# Patient Record
Sex: MALE | Race: BLACK OR AFRICAN AMERICAN | Employment: FULL TIME | URBAN - METROPOLITAN AREA
[De-identification: names, ages, dates, MRNs, and addresses within clinical notes are randomized per-mention and may not be internally consistent; named-entity substitution may affect disease eponyms.]

---

## 2020-05-04 ENCOUNTER — TRANSCRIBE ORDERS (OUTPATIENT)
Dept: ADMINISTRATIVE | Age: 55
End: 2020-05-04

## 2020-05-04 ENCOUNTER — APPOINTMENT (OUTPATIENT)
Dept: LAB | Age: 55
End: 2020-05-04
Attending: PREVENTIVE MEDICINE

## 2020-05-04 ENCOUNTER — APPOINTMENT (OUTPATIENT)
Dept: RADIOLOGY | Age: 55
End: 2020-05-04
Attending: PREVENTIVE MEDICINE

## 2020-05-04 DIAGNOSIS — Z02.1 ENCOUNTER FOR PRE-EMPLOYMENT HEALTH SCREENING EXAMINATION: Primary | ICD-10-CM

## 2020-05-04 DIAGNOSIS — Z02.1 ENCOUNTER FOR PRE-EMPLOYMENT HEALTH SCREENING EXAMINATION: ICD-10-CM

## 2020-05-04 LAB
ALBUMIN SERPL BCP-MCNC: 3.5 G/DL (ref 3.5–5)
ALP SERPL-CCNC: 94 U/L (ref 46–116)
ALT SERPL W P-5'-P-CCNC: 28 U/L (ref 12–78)
ANION GAP SERPL CALCULATED.3IONS-SCNC: 6 MMOL/L (ref 4–13)
AST SERPL W P-5'-P-CCNC: 21 U/L (ref 5–45)
BASOPHILS # BLD AUTO: 0.04 THOUSANDS/ΜL (ref 0–0.1)
BASOPHILS NFR BLD AUTO: 1 % (ref 0–1)
BILIRUB SERPL-MCNC: 0.45 MG/DL (ref 0.2–1)
BUN SERPL-MCNC: 9 MG/DL (ref 5–25)
CALCIUM SERPL-MCNC: 8.9 MG/DL (ref 8.3–10.1)
CHLORIDE SERPL-SCNC: 104 MMOL/L (ref 100–108)
CO2 SERPL-SCNC: 29 MMOL/L (ref 21–32)
CREAT SERPL-MCNC: 1.11 MG/DL (ref 0.6–1.3)
EOSINOPHIL # BLD AUTO: 0.08 THOUSAND/ΜL (ref 0–0.61)
EOSINOPHIL NFR BLD AUTO: 1 % (ref 0–6)
ERYTHROCYTE [DISTWIDTH] IN BLOOD BY AUTOMATED COUNT: 13.2 % (ref 11.6–15.1)
GFR SERPL CREATININE-BSD FRML MDRD: 87 ML/MIN/1.73SQ M
GLUCOSE SERPL-MCNC: 124 MG/DL (ref 65–140)
HCT VFR BLD AUTO: 46.2 % (ref 36.5–49.3)
HGB BLD-MCNC: 15.1 G/DL (ref 12–17)
IMM GRANULOCYTES # BLD AUTO: 0.02 THOUSAND/UL (ref 0–0.2)
IMM GRANULOCYTES NFR BLD AUTO: 0 % (ref 0–2)
LYMPHOCYTES # BLD AUTO: 2.11 THOUSANDS/ΜL (ref 0.6–4.47)
LYMPHOCYTES NFR BLD AUTO: 34 % (ref 14–44)
MCH RBC QN AUTO: 29.9 PG (ref 26.8–34.3)
MCHC RBC AUTO-ENTMCNC: 32.7 G/DL (ref 31.4–37.4)
MCV RBC AUTO: 92 FL (ref 82–98)
MONOCYTES # BLD AUTO: 0.67 THOUSAND/ΜL (ref 0.17–1.22)
MONOCYTES NFR BLD AUTO: 11 % (ref 4–12)
NEUTROPHILS # BLD AUTO: 3.24 THOUSANDS/ΜL (ref 1.85–7.62)
NEUTS SEG NFR BLD AUTO: 53 % (ref 43–75)
NRBC BLD AUTO-RTO: 0 /100 WBCS
PLATELET # BLD AUTO: 326 THOUSANDS/UL (ref 149–390)
PMV BLD AUTO: 10.3 FL (ref 8.9–12.7)
POTASSIUM SERPL-SCNC: 3.8 MMOL/L (ref 3.5–5.3)
PROT SERPL-MCNC: 7.6 G/DL (ref 6.4–8.2)
RBC # BLD AUTO: 5.05 MILLION/UL (ref 3.88–5.62)
SODIUM SERPL-SCNC: 139 MMOL/L (ref 136–145)
WBC # BLD AUTO: 6.16 THOUSAND/UL (ref 4.31–10.16)

## 2020-05-04 PROCEDURE — 36415 COLL VENOUS BLD VENIPUNCTURE: CPT

## 2020-05-04 PROCEDURE — 85025 COMPLETE CBC W/AUTO DIFF WBC: CPT

## 2020-05-04 PROCEDURE — 71046 X-RAY EXAM CHEST 2 VIEWS: CPT

## 2020-05-04 PROCEDURE — 80053 COMPREHEN METABOLIC PANEL: CPT

## 2021-01-19 ENCOUNTER — OFFICE VISIT (OUTPATIENT)
Dept: CARDIOLOGY CLINIC | Facility: CLINIC | Age: 56
End: 2021-01-19
Payer: COMMERCIAL

## 2021-01-19 VITALS
HEIGHT: 70 IN | HEART RATE: 72 BPM | WEIGHT: 261.2 LBS | SYSTOLIC BLOOD PRESSURE: 138 MMHG | BODY MASS INDEX: 37.39 KG/M2 | DIASTOLIC BLOOD PRESSURE: 88 MMHG

## 2021-01-19 DIAGNOSIS — I10 ESSENTIAL HYPERTENSION: ICD-10-CM

## 2021-01-19 DIAGNOSIS — I10 ESSENTIAL (PRIMARY) HYPERTENSION: ICD-10-CM

## 2021-01-19 DIAGNOSIS — R00.1 BRADYCARDIA: Primary | ICD-10-CM

## 2021-01-19 DIAGNOSIS — I10 HYPERTENSION, UNSPECIFIED TYPE: ICD-10-CM

## 2021-01-19 PROCEDURE — 99243 OFF/OP CNSLTJ NEW/EST LOW 30: CPT | Performed by: INTERNAL MEDICINE

## 2021-01-19 RX ORDER — DIPHENHYD/PHENYLEPH/ACETAMINOP 12.5-5-325
TABLET ORAL DAILY
Qty: 1 EACH | Refills: 0 | Status: SHIPPED | OUTPATIENT
Start: 2021-01-19

## 2021-01-19 RX ORDER — OMEGA-3 FATTY ACIDS CAP DELAYED RELEASE 1000 MG 1000 MG
CAPSULE DELAYED RELEASE ORAL DAILY
COMMUNITY

## 2021-01-19 RX ORDER — MULTIVITAMIN
1 CAPSULE ORAL DAILY
COMMUNITY

## 2021-01-19 RX ORDER — AMLODIPINE BESYLATE 5 MG/1
TABLET ORAL
Qty: 30 TABLET | Refills: 0 | Status: SHIPPED | OUTPATIENT
Start: 2021-01-19 | End: 2021-02-12

## 2021-01-19 NOTE — PROGRESS NOTES
Cardiology Follow Up    Eugene Tavera  1965  [id number]  [facility]  [address] [address] [phone]    1  Bradycardia     2  Hypertension, unspecified type         Interval History:  Cardiology consultation  Pleasant 61-year-old [facility] American male who has history of longstanding hypertension, he has been experiencing blood pressure problems for almost 2 decades  Well controlled for the most part per patient, he was on thiazide diuretic for several years  He was on was switched to beta-blocker few years ago, initially nebivolol her most recent metoprolol, this was discontinue couple weeks ago, was switched over to amlodipine  Today's blood pressure is 138/88, he does not have any ambulatory readings  He was found to be bradycardic as well in the setting of chronic beta-blocker therapy  Denies syncope or presyncope denies any chest pain, active but no regular exercise, sleep hygiene appears to be adequate, he admits to close to 25 lb weight gain over last few months because of the COVID-19 pandemic  His BMI is 37, he tries told although he does not read labels, he does not follow a strict 2 g/24 hour sodium chloride restriction  He admits to be under significant stress at work  Denies hypersomnolence, denies history of Witness sleep apneas  Blood work from last year revealed normal electrolytes and liver function tests and renal function test   Lipid status is unknown  Recent EKG, personally revealed revealed significant sinus bradycardia heart rate of 46, normal QRS and ST segments      Patient Active Problem List   Diagnosis    Hypertension    Bradycardia     Past Medical History:   Diagnosis Date    Bradycardia     Weight gain      Social History     Socioeconomic History    Marital status: Single     Spouse name: Not on file    Number of children: Not on file    Years of education: Not on file    Highest education level: Not on file   Occupational History    Not on file   Social Needs    Financial resource strain: Not on file    Food insecurity     Worry: Not on file     Inability: Not on file    Transportation needs     Medical: Not on file     Non-medical: Not on file   Tobacco Use    Smoking status: Never Smoker    Smokeless tobacco: Never Used   Substance and Sexual Activity    Alcohol use: Yes     Comment: Occasional     Drug use: Not on file     Comment: No dug use - As per [employer]    Sexual activity: Not on file   Lifestyle    Physical activity     Days per week: Not on file     Minutes per session: Not on file    Stress: Not on file   Relationships    Social connections     Talks on phone: Not on file     Gets together: Not on file     Attends Church service: Not on file     Active member of club or organization: Not on file     Attends meetings of clubs or organizations: Not on file     Relationship status: Not on file    Intimate partner violence     Fear of current or ex partner: Not on file     Emotionally abused: Not on file     Physically abused: Not on file     Forced sexual activity: Not on file   Other Topics Concern    Not on file   Social History Narrative    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    Lives with his mom        Sees urology in [place]; last seen 2013 in [place], [place]    - As per [address]      Family History   Problem Relation Age of Onset    Hypertension Mother     Other Father         pacemaker     Hypertension Father     Prostate cancer Father         diagnosed in his 62s     Prostate cancer Family      Past Surgical History:   Procedure Laterality Date    COLONOSCOPY  2013    [place], [place]     HEMORROIDECTOMY      [id number]     HERNIA REPAIR Bilateral     In childhood     SHOULDER SURGERY      Pain - impingement       Current Outpatient Medications:     amLODIPine (NORVASC) 5 mg tablet, TAKE 1 TABLET BY MOUTH EVERY DAY, Disp: 30 tablet, Rfl: 0    CINNAMON PO, Take by mouth daily, Disp: , Rfl:     Multiple Vitamin (multivitamin) capsule, Take 1 capsule by mouth daily, Disp: , Rfl:     Omega-3 Fatty Acids (Fish Oil) 1000 MG CPDR, Take by mouth daily, Disp: , Rfl:   Allergies   Allergen Reactions    Penicillins Swelling       Labs:  No visits with results within 6 Month(s) from this visit  Latest known visit with results is:   Appointment on 05/04/2020   Component Date Value    WBC 05/04/2020 6 16     RBC 05/04/2020 5 05     Hemoglobin 05/04/2020 15 1     Hematocrit 05/04/2020 46 2     MCV 05/04/2020 92     MCH 05/04/2020 29 9     MCHC 05/04/2020 32 7     RDW 05/04/2020 13 2     MPV 05/04/2020 10 3     Platelets 65/90/7397 326     nRBC 05/04/2020 0     Neutrophils Relative 05/04/2020 53     Immat GRANS % 05/04/2020 0     Lymphocytes Relative 05/04/2020 34     Monocytes Relative 05/04/2020 11     Eosinophils Relative 05/04/2020 1     Basophils Relative 05/04/2020 1     Neutrophils Absolute 05/04/2020 3 24     Immature Grans Absolute 05/04/2020 0 02     Lymphocytes Absolute 05/04/2020 2 11     Monocytes Absolute 05/04/2020 0 67     Eosinophils Absolute 05/04/2020 0 08     Basophils Absolute 05/04/2020 0 04     Sodium 05/04/2020 139     Potassium 05/04/2020 3 8     Chloride 05/04/2020 104     CO2 05/04/2020 29     ANION GAP 05/04/2020 6     BUN 05/04/2020 9     Creatinine 05/04/2020 1 11     Glucose 05/04/2020 124     Calcium 05/04/2020 8 9     AST 05/04/2020 21     ALT 05/04/2020 28     Alkaline Phosphatase 05/04/2020 94     Total Protein 05/04/2020 7 6     Albumin 05/04/2020 3 5     Total Bilirubin 05/04/2020 0 45     eGFR 05/04/2020 87      Imaging: No results found  Review of Systems:  Review of Systems   Constitutional: Positive for unexpected weight change  Negative for activity change, chills, fatigue and fever     HENT: Negative for congestion, hearing loss, nosebleeds, sore throat and trouble swallowing  Eyes: Negative for visual disturbance  Respiratory: Negative for apnea, cough, choking, chest tightness, shortness of breath, wheezing and stridor  Cardiovascular: Negative for chest pain, palpitations and leg swelling  Gastrointestinal: Negative for abdominal distention, abdominal pain, anal bleeding, blood in stool, constipation, diarrhea, nausea, rectal pain and vomiting  Endocrine: Negative for cold intolerance and heat intolerance  Genitourinary: Negative for difficulty urinating, dysuria, flank pain, frequency, hematuria and urgency  Musculoskeletal: Positive for joint swelling  Negative for arthralgias, back pain, gait problem and myalgias  Skin: Positive for rash  Negative for pallor and wound  Allergic/Immunologic: Negative for immunocompromised state  Neurological: Positive for headaches  Negative for dizziness, tremors, seizures, syncope, facial asymmetry, speech difficulty, weakness, light-headedness and numbness  Psychiatric/Behavioral: Negative for behavioral problems, confusion, decreased concentration and sleep disturbance  The patient is not nervous/anxious  Physical Exam:  Physical Exam  Vitals signs reviewed  Constitutional:       General: He is not in acute distress  Appearance: Normal appearance  He is obese  He is not ill-appearing, toxic-appearing or diaphoretic  HENT:      Head: Normocephalic  Eyes:      General: No scleral icterus  Conjunctiva/sclera: Conjunctivae normal    Neck:      Vascular: No carotid bruit  Cardiovascular:      Rate and Rhythm: Regular rhythm  Bradycardia present  Pulses: Normal pulses  Heart sounds: Normal heart sounds  No murmur  No friction rub  No gallop  Pulmonary:      Effort: Pulmonary effort is normal  No respiratory distress  Breath sounds: Normal breath sounds  No stridor  No wheezing, rhonchi or rales     Abdominal:      General: Bowel sounds are normal       Palpations: Abdomen is soft  Tenderness: There is no abdominal tenderness  Comments: No bruits   Musculoskeletal:      Right lower leg: No edema  Left lower leg: No edema  Skin:     General: Skin is warm and dry  Capillary Refill: Capillary refill takes less than 2 seconds  Coloration: Skin is not jaundiced  Findings: No bruising or erythema  Neurological:      General: No focal deficit present  Mental Status: He is alert and oriented to person, place, and time  Psychiatric:         Mood and Affect: Mood normal          Behavior: Behavior normal          Thought Content: Thought content normal          Judgment: Judgment normal          Discussion/Summary:  Hypertension essential, borderline to partial control on recently started calcium channel blocker  Significant bradycardia in the setting of previous beta-blocker use, the present time favor ambulatory blood pressure readings, I had given instructions to do so, is to call me back in 2 weeks times with the readings and average and adjustments will be made at that time  Nonpharmacological ways including improvement of adherence to low-sodium diet, regular exercise and weight management recommended  He may need a polysomnogram at some point in the future blood pressure difficult to control, the present time favor no interventions in that regard  Long-term benefits of blood pressure control and reduction of CVA, reduction of renal problems and myocardial infarction were also explained to the patient  Given the bradycardia possibly related to his beta-blocker use, will do a Holter monitor as well as an exercise stress test to assess blood pressure response and chronotropic competence  This note was completed in part utilizing m-[employer] fluency direct voice recognition software     Grammatical errors, random word insertion, spelling mistakes, and incomplete sentences may be an occasional consequence of the system secondary to software limitations, ambient noise and hardware issues  At the time of dictation, efforts were made to edit, clarify and /or correct errors  Please read the chart carefully and recognize, using context, where substitutions have occurred  If you have any questions or concerns about the context, text or information contained within the body of this dictation, please contact myself, the provider, for further clarification

## 2021-01-23 PROBLEM — R73.9 HYPERGLYCEMIA: Status: ACTIVE | Noted: 2021-01-23

## 2021-01-23 PROBLEM — E78.00 HYPERCHOLESTEREMIA: Status: ACTIVE | Noted: 2021-01-23

## 2021-01-23 PROBLEM — R79.89 LOW TSH LEVEL: Status: ACTIVE | Noted: 2021-01-23

## 2021-01-23 PROBLEM — R63.5 WEIGHT GAIN: Status: ACTIVE | Noted: 2021-01-23

## 2021-01-23 NOTE — ASSESSMENT & PLAN NOTE
TSH is 0 25, but  free T3 is 3 64 normal and free T4 is also normal 1 17    Possible euthyroid sick syndrome will follow-up the TSH if persistently low will need further evaluation

## 2021-01-26 ENCOUNTER — OFFICE VISIT (OUTPATIENT)
Dept: INTERNAL MEDICINE CLINIC | Facility: CLINIC | Age: 56
End: 2021-01-26
Payer: COMMERCIAL

## 2021-01-26 VITALS
DIASTOLIC BLOOD PRESSURE: 78 MMHG | WEIGHT: 258 LBS | HEIGHT: 70 IN | TEMPERATURE: 98.3 F | BODY MASS INDEX: 36.94 KG/M2 | OXYGEN SATURATION: 99 % | HEART RATE: 63 BPM | SYSTOLIC BLOOD PRESSURE: 126 MMHG

## 2021-01-26 DIAGNOSIS — R79.89 LOW TSH LEVEL: ICD-10-CM

## 2021-01-26 DIAGNOSIS — R00.1 BRADYCARDIA: ICD-10-CM

## 2021-01-26 DIAGNOSIS — E78.00 HYPERCHOLESTEREMIA: ICD-10-CM

## 2021-01-26 DIAGNOSIS — I10 ESSENTIAL HYPERTENSION: Primary | ICD-10-CM

## 2021-01-26 DIAGNOSIS — R73.9 HYPERGLYCEMIA: ICD-10-CM

## 2021-01-26 DIAGNOSIS — Z12.5 SCREENING PSA (PROSTATE SPECIFIC ANTIGEN): ICD-10-CM

## 2021-01-26 PROCEDURE — 1036F TOBACCO NON-USER: CPT | Performed by: INTERNAL MEDICINE

## 2021-01-26 PROCEDURE — 99213 OFFICE O/P EST LOW 20 MIN: CPT | Performed by: INTERNAL MEDICINE

## 2021-01-26 PROCEDURE — 3074F SYST BP LT 130 MM HG: CPT | Performed by: INTERNAL MEDICINE

## 2021-01-26 PROCEDURE — 3078F DIAST BP <80 MM HG: CPT | Performed by: INTERNAL MEDICINE

## 2021-01-26 PROCEDURE — 3008F BODY MASS INDEX DOCD: CPT | Performed by: INTERNAL MEDICINE

## 2021-01-26 NOTE — ASSESSMENT & PLAN NOTE
Heart rate 56 today better than last time but still has a bradycardia  Patient denies symptoms of sleep apnea     Had a cardiology evaluation going for stress test and Holter monitor

## 2021-01-26 NOTE — PROGRESS NOTES
Assessment/Plan:         Problem List Items Addressed This Visit        Cardiovascular and Mediastinum    Hypertension - Primary     Bp better on present med  Advise to watch diet for salt intake cont  present med  Relevant Orders    Glucose, fasting       Other    Bradycardia     Heart rate 56 today better than last time but still has a bradycardia  Patient denies symptoms of sleep apnea     Had a cardiology evaluation going for stress test and Holter monitor  Hypercholesteremia     His cholesterol is 263, triglyceride 135, HDL 49,   Patient would like to try to watch diet and try to lose weight,and  see if  He can control without medicine  Will follow after 3 months a persistently elevated will need medication  Relevant Orders    Lipid panel    Hyperglycemia     His fasting blood sugars 110  Advised to watch diet for sugar carbs intake  Relevant Orders    Hemoglobin A1C    Glucose, fasting    Low TSH level     TSH is 0 25, but  free T3 is 3 64 normal and free T4 is also normal 1 17  Possible euthyroid sick syndrome will follow-up the TSH if persistently low will need further evaluation         Relevant Orders    TSH, 3rd generation    BMI 37 0-37 9, adult     Discussed with the patient's to exercise ,decrease calorie intake and lose weight              Other Visit Diagnoses     Screening PSA (prostate specific antigen)        Relevant Orders    PSA, Total Screen            Subjective:      Patient ID: Nakia Valdez is a 55 y o  male  HPI patient is here for follow-up  He denies any chest pain shortness of breath or pain in abdomen  Denies any cough, fever, chills  Overall he is doing well  Presently does not have any other new complaints      The following portions of the patient's history were reviewed and updated as appropriate:   Past Medical History:  He has a past medical history of BMI 37 0-37 9, adult (1/26/2021), Bradycardia, Hypercholesteremia (1/23/2021), Hyperglycemia (1/23/2021), Low TSH level (1/23/2021), Weight gain, and Weight gain (1/23/2021)  ,  _______________________________________________________________________  Past Surgical History:   has a past surgical history that includes Hernia repair (Bilateral); Hemorroidectomy; Shoulder surgery; and Colonoscopy (2013)  ,  _______________________________________________________________________  Family History:  family history includes Hypertension in his father and mother; Other in his father; Prostate cancer in his family and father ,  _______________________________________________________________________  Social History:   reports that he has never smoked  He has never used smokeless tobacco  He reports current alcohol use  He reports that he does not use drugs  ,  _______________________________________________________________________  Allergies:  is allergic to penicillins     _______________________________________________________________________  Current Outpatient Medications   Medication Sig Dispense Refill    amLODIPine (NORVASC) 5 mg tablet TAKE 1 TABLET BY MOUTH EVERY DAY 30 tablet 0    Blood Pressure Monitoring (Blood Pressure Kit) KIT Use daily Adult 1 each 0    CINNAMON PO Take by mouth daily      Multiple Vitamin (multivitamin) capsule Take 1 capsule by mouth daily      Omega-3 Fatty Acids (Fish Oil) 1000 MG CPDR Take by mouth daily       No current facility-administered medications for this visit       _______________________________________________________________________  Review of Systems   Constitutional: Negative for chills, fatigue and fever  HENT: Negative for congestion, ear pain, postnasal drip, sinus pain, sore throat and trouble swallowing  Eyes: Negative for pain and visual disturbance  Respiratory: Negative for cough, chest tightness and shortness of breath  Cardiovascular: Negative for chest pain, palpitations and leg swelling     Gastrointestinal: Negative for abdominal pain, blood in stool, constipation, diarrhea and nausea  Genitourinary: Negative for dysuria, flank pain and frequency  Musculoskeletal: Negative for arthralgias and myalgias  Skin: Negative for rash  Neurological: Negative for dizziness, speech difficulty, weakness and headaches  Hematological: Does not bruise/bleed easily  Psychiatric/Behavioral: Negative for behavioral problems  Objective:  Vitals:    01/26/21 1519   BP: 126/78   BP Location: Right arm   Patient Position: Sitting   Cuff Size: Adult   Pulse: 63   Temp: 98 3 °F (36 8 °C)   TempSrc: Tympanic   SpO2: 99%   Weight: 117 kg (258 lb)   Height: 5' 10" (1 778 m)     Body mass index is 37 02 kg/m²  Physical Exam  Vitals signs and nursing note reviewed  Constitutional:       Appearance: Normal appearance  HENT:      Head: Normocephalic  Right Ear: Tympanic membrane and external ear normal       Left Ear: Tympanic membrane and external ear normal       Mouth/Throat:      Comments: Patient has face mask on  Eyes:      General: No scleral icterus  Pupils: Pupils are equal, round, and reactive to light  Neck:      Musculoskeletal: No neck rigidity or muscular tenderness  Vascular: No carotid bruit  Cardiovascular:      Rate and Rhythm: Normal rate and regular rhythm  Pulses: Normal pulses  Heart sounds: Normal heart sounds  No murmur  Pulmonary:      Effort: Pulmonary effort is normal  No respiratory distress  Breath sounds: Normal breath sounds  No wheezing  Abdominal:      General: Abdomen is flat  Palpations: Abdomen is soft  There is no mass  Tenderness: There is no abdominal tenderness  There is no rebound  Musculoskeletal:      Right lower leg: No edema  Left lower leg: No edema  Skin:     General: Skin is warm  Findings: No rash  Neurological:      General: No focal deficit present  Mental Status: He is alert  Motor: No weakness     Psychiatric: Mood and Affect: Mood normal          Behavior: Behavior normal

## 2021-01-26 NOTE — ASSESSMENT & PLAN NOTE
His cholesterol is 263, triglyceride 135, HDL 49,   Patient would like to try to watch diet and try to lose weight,and  see if  He can control without medicine  Will follow after 3 months a persistently elevated will need medication  detailed exam

## 2021-01-26 NOTE — PATIENT INSTRUCTIONS
Patient advised to continue present medications discussed with the patient medications and laboratory data in detail  Follow-up with me in 3 months    If any blood test was ordered please do 1 week prior to next appointment  If you have any questions please call the office [phone]

## 2021-02-02 ENCOUNTER — TELEPHONE (OUTPATIENT)
Dept: OTHER | Facility: OTHER | Age: 56
End: 2021-02-02

## 2021-02-05 NOTE — TELEPHONE ENCOUNTER
Left message on pt's cell phone that Dr Elinor Mathew reviewed bp readings and result is excellent and continue same meds  Asked pt to return my call to confirm he received

## 2021-02-12 DIAGNOSIS — I10 ESSENTIAL HYPERTENSION: ICD-10-CM

## 2021-02-12 DIAGNOSIS — I10 ESSENTIAL (PRIMARY) HYPERTENSION: ICD-10-CM

## 2021-02-12 RX ORDER — AMLODIPINE BESYLATE 5 MG/1
TABLET ORAL
Qty: 90 TABLET | Refills: 0 | Status: SHIPPED | OUTPATIENT
Start: 2021-02-12 | End: 2021-05-07

## 2021-02-23 ENCOUNTER — HOSPITAL ENCOUNTER (OUTPATIENT)
Dept: NON INVASIVE DIAGNOSTICS | Facility: HOSPITAL | Age: 56
Discharge: HOME | End: 2021-02-23
Payer: COMMERCIAL

## 2021-02-23 DIAGNOSIS — I10 HYPERTENSION, UNSPECIFIED TYPE: ICD-10-CM

## 2021-02-23 DIAGNOSIS — R00.1 BRADYCARDIA: ICD-10-CM

## 2021-02-23 LAB
CHEST PAIN STATEMENT: NORMAL
MAX DIASTOLIC BP: 87 MMHG
MAX HEART RATE: 151 BPM
MAX PREDICTED HEART RATE: 165 BPM
MAX. SYSTOLIC BP: 179 MMHG
PROTOCOL NAME: NORMAL
REASON FOR TERMINATION: NORMAL
TARGET HR FORMULA: NORMAL
TEST INDICATION: NORMAL
TIME IN EXERCISE PHASE: NORMAL

## 2021-02-23 PROCEDURE — 93226 XTRNL ECG REC<48 HR SCAN A/R: CPT

## 2021-02-23 PROCEDURE — 93017 CV STRESS TEST TRACING ONLY: CPT

## 2021-02-23 PROCEDURE — 93225 XTRNL ECG REC<48 HRS REC: CPT

## 2021-02-24 PROCEDURE — 93016 CV STRESS TEST SUPVJ ONLY: CPT | Performed by: INTERNAL MEDICINE

## 2021-02-24 PROCEDURE — 93018 CV STRESS TEST I&R ONLY: CPT | Performed by: INTERNAL MEDICINE

## 2021-03-04 PROCEDURE — 93227 XTRNL ECG REC<48 HR R&I: CPT | Performed by: INTERNAL MEDICINE

## 2021-03-30 DIAGNOSIS — Z23 ENCOUNTER FOR IMMUNIZATION: ICD-10-CM

## 2021-04-03 ENCOUNTER — IMMUNIZATIONS (OUTPATIENT)
Dept: FAMILY MEDICINE CLINIC | Facility: HOSPITAL | Age: 56
End: 2021-04-03

## 2021-04-03 DIAGNOSIS — Z23 ENCOUNTER FOR IMMUNIZATION: Primary | ICD-10-CM

## 2021-04-03 PROCEDURE — 91300 SARS-COV-2 / COVID-19 MRNA VACCINE (PFIZER-BIONTECH) 30 MCG: CPT

## 2021-04-03 PROCEDURE — 0001A SARS-COV-2 / COVID-19 MRNA VACCINE (PFIZER-BIONTECH) 30 MCG: CPT

## 2021-04-20 LAB — HBA1C MFR BLD HPLC: 6.1 %

## 2021-04-25 ENCOUNTER — IMMUNIZATIONS (OUTPATIENT)
Dept: FAMILY MEDICINE CLINIC | Facility: HOSPITAL | Age: 56
End: 2021-04-25

## 2021-04-25 DIAGNOSIS — Z23 ENCOUNTER FOR IMMUNIZATION: Primary | ICD-10-CM

## 2021-04-25 PROCEDURE — 91300 SARS-COV-2 / COVID-19 MRNA VACCINE (PFIZER-BIONTECH) 30 MCG: CPT

## 2021-04-25 PROCEDURE — 0002A SARS-COV-2 / COVID-19 MRNA VACCINE (PFIZER-BIONTECH) 30 MCG: CPT

## 2021-04-27 ENCOUNTER — TELEPHONE (OUTPATIENT)
Dept: INTERNAL MEDICINE CLINIC | Facility: CLINIC | Age: 56
End: 2021-04-27

## 2021-04-27 ENCOUNTER — OFFICE VISIT (OUTPATIENT)
Dept: INTERNAL MEDICINE CLINIC | Facility: CLINIC | Age: 56
End: 2021-04-27
Payer: COMMERCIAL

## 2021-04-27 VITALS
BODY MASS INDEX: 36.51 KG/M2 | OXYGEN SATURATION: 99 % | HEIGHT: 70 IN | HEART RATE: 62 BPM | WEIGHT: 255 LBS | DIASTOLIC BLOOD PRESSURE: 84 MMHG | TEMPERATURE: 98.1 F | SYSTOLIC BLOOD PRESSURE: 124 MMHG

## 2021-04-27 DIAGNOSIS — G47.09 OTHER INSOMNIA: ICD-10-CM

## 2021-04-27 DIAGNOSIS — R79.89 LOW TSH LEVEL: ICD-10-CM

## 2021-04-27 DIAGNOSIS — R97.20 ELEVATED PSA: ICD-10-CM

## 2021-04-27 DIAGNOSIS — K63.5 HYPERPLASTIC POLYP OF SIGMOID COLON: ICD-10-CM

## 2021-04-27 DIAGNOSIS — I10 ESSENTIAL HYPERTENSION: Primary | ICD-10-CM

## 2021-04-27 DIAGNOSIS — R73.9 HYPERGLYCEMIA: ICD-10-CM

## 2021-04-27 DIAGNOSIS — E78.00 HYPERCHOLESTEREMIA: ICD-10-CM

## 2021-04-27 DIAGNOSIS — R00.1 BRADYCARDIA: ICD-10-CM

## 2021-04-27 PROCEDURE — 3008F BODY MASS INDEX DOCD: CPT | Performed by: INTERNAL MEDICINE

## 2021-04-27 PROCEDURE — 1036F TOBACCO NON-USER: CPT | Performed by: INTERNAL MEDICINE

## 2021-04-27 PROCEDURE — 99214 OFFICE O/P EST MOD 30 MIN: CPT | Performed by: INTERNAL MEDICINE

## 2021-04-27 PROCEDURE — 3074F SYST BP LT 130 MM HG: CPT | Performed by: INTERNAL MEDICINE

## 2021-04-27 RX ORDER — MELATONIN
DAILY
COMMUNITY
End: 2021-10-26 | Stop reason: SDUPTHER

## 2021-04-27 NOTE — ASSESSMENT & PLAN NOTE
His TSH was low 0 25 with normal free T3 and free T4 December 2020   His repeat TSH is still low 0 28  He does not have symptoms of hyperthyroidism except complain of insomnia

## 2021-04-27 NOTE — ASSESSMENT & PLAN NOTE
And rectal area  Colonoscopy was done November 2013  Discussed with the patient needs follow-up colonoscopy    Would like to wait until COVID 19 situation gets  better

## 2021-04-27 NOTE — ASSESSMENT & PLAN NOTE
Fasting blood sugar decreased from 110-97  But has elevated hemoglobin A1c up to 6 1    Consistent with prediabetes  advised for low sugar low carbs diet

## 2021-04-27 NOTE — PROGRESS NOTES
Assessment/Plan:    1  Essential hypertension  Assessment & Plan:  Blood pressure well controlled  Advised to continue present medication  Advised for low-salt diet  Orders:  -     CBC and differential; Future  -     Comprehensive metabolic panel; Future    2  Hyperplastic polyp of sigmoid colon  Assessment & Plan:  And rectal area  Colonoscopy was done November 2013  Discussed with the patient needs follow-up colonoscopy  Would like to wait until COVID 19 situation gets  better      3  Hypercholesteremia  Assessment & Plan:  Cholesterol decreased from 263-237, LDL decreased from 187-166  Still elevated discussed with the patient to start medication but he refused to start any medications  Will continue to watch diet and try to lose weight  Advised for low-cholesterol low saturated fat diet    Orders:  -     Lipid panel; Future    4  Hyperglycemia  Assessment & Plan:  Fasting blood sugar decreased from 110-97  But has elevated hemoglobin A1c up to 6 1  Consistent with prediabetes  advised for low sugar low carbs diet    Orders:  -     Comprehensive metabolic panel; Future  -     Hemoglobin A1C; Future    5  Low TSH level  Assessment & Plan:  His TSH was low 0 25 with normal free T3 and free T4 December 2020   His repeat TSH is still low 0 28  He does not have symptoms of hyperthyroidism except complain of insomnia  Orders:  -     Ambulatory referral to Endocrinology; Future    6  BMI 36 0-36 9,adult    7  Elevated PSA  Assessment & Plan:  Patient states me that he had seen a urologist for elevated PSA in [facility]  I Do not have  his previous medical record to compare his PSA level  Patient said he will schedule to see same urologist in [facility]  8  Other insomnia  Assessment & Plan:  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  Patient had tried melatonin in the past   So advised to take melatonin q h s  P r n  For insomnia for now        9  Bradycardia  Assessment & Plan:  Bradycardia resolved  Had seen Cardiology in the past      BMI Counseling: Body mass index is 36 59 kg/m²  The BMI is above normal  Nutrition recommendations include decreasing portion sizes, decreasing fast food intake, consuming healthier snacks, limiting drinks that contain sugar, moderation in carbohydrate intake, reducing intake of saturated and trans fat and reducing intake of cholesterol  Exercise recommendations include exercising 3-5 times per week and strength training exercises  No pharmacotherapy was ordered  Subjective:  Patient presents for follow-up  Patient ID: Ryder Finnegan is a 55 y o  male  HPI   20-year-old male patient presents for follow-up of his medical problems  He denies any chest pain, shortness with, pain abdomen  Denies any nausea, vomiting, diarrhea, constipation  Denies any cough, fever, chills  He got his COVID-19 vaccination   Complain of insomnia  Patient states his work schedule changed like a swing shift that might be affecting his sleep  He tried melatonin in the past    The following portions of the patient's history were reviewed and updated as appropriate:     Past Medical History:  He has a past medical history of BMI 36 0-36 9,adult (4/27/2021), BMI 37 0-37 9, adult (1/26/2021), Bradycardia, Elevated PSA (4/27/2021), Hypercholesteremia (1/23/2021), Hyperglycemia (1/23/2021), Hyperplastic polyp of sigmoid colon (4/27/2021), Low TSH level (1/23/2021), Other insomnia (4/27/2021), Weight gain, and Weight gain (1/23/2021)  ,  _______________________________________________________________________  Past Surgical History:   has a past surgical history that includes Hernia repair (Bilateral); Hemorroidectomy; Shoulder surgery; and Colonoscopy (2013)  ,  _______________________________________________________________________  Family History:  family history includes Hypertension in his father and mother;  Other in his father; Prostate cancer in his family and father ,  _______________________________________________________________________  Social History:   reports that he has never smoked  He has never used smokeless tobacco  He reports current alcohol use  He reports that he does not use drugs  ,  _______________________________________________________________________  Allergies:  is allergic to penicillins     _______________________________________________________________________  Current Outpatient Medications   Medication Sig Dispense Refill    amLODIPine (NORVASC) 5 mg tablet TAKE 1 TABLET BY MOUTH EVERY DAY 90 tablet 0    Blood Pressure Monitoring (Blood Pressure Kit) KIT Use daily Adult 1 each 0    cholecalciferol (VITAMIN D3) 1,000 units tablet Take by mouth daily 10 000 iu      CINNAMON PO Take by mouth daily      Multiple Vitamin (multivitamin) capsule Take 1 capsule by mouth daily      Omega-3 Fatty Acids (Fish Oil) 1000 MG CPDR Take by mouth daily       No current facility-administered medications for this visit       _______________________________________________________________________  Review of Systems   Constitutional: Negative for chills and fever  HENT: Negative for congestion, ear pain, hearing loss, nosebleeds, sinus pain, sore throat and trouble swallowing  Eyes: Negative for discharge, redness and visual disturbance  Respiratory: Negative for cough, chest tightness and shortness of breath  Cardiovascular: Negative for chest pain and palpitations  Gastrointestinal: Negative for abdominal pain, blood in stool, constipation, diarrhea, nausea and vomiting  Genitourinary: Negative for dysuria, flank pain, frequency and hematuria  Musculoskeletal: Negative for arthralgias, myalgias and neck pain  Skin: Negative for color change and rash  Neurological: Negative for dizziness, speech difficulty, weakness and headaches  Hematological: Does not bruise/bleed easily  Psychiatric/Behavioral: Positive for sleep disturbance  Negative for agitation and behavioral problems  Objective:  Vitals:    04/27/21 1244   BP: 124/84   BP Location: Right arm   Patient Position: Sitting   Cuff Size: Adult   Pulse: 62   Temp: 98 1 °F (36 7 °C)   TempSrc: Tympanic   SpO2: 99%   Weight: 116 kg (255 lb)   Height: 5' 10" (1 778 m)     Body mass index is 36 59 kg/m²  Physical Exam  Vitals signs and nursing note reviewed  Constitutional:       General: He is not in acute distress  Appearance: Normal appearance  HENT:      Head: Normocephalic and atraumatic  Right Ear: Ear canal and external ear normal       Left Ear: Ear canal and external ear normal       Mouth/Throat:      Comments: Patient has a face mask on  Eyes:      General: No scleral icterus  Extraocular Movements: Extraocular movements intact  Conjunctiva/sclera: Conjunctivae normal    Neck:      Musculoskeletal: Normal range of motion and neck supple  No muscular tenderness  Cardiovascular:      Rate and Rhythm: Normal rate and regular rhythm  Pulses: Normal pulses  Heart sounds: No murmur  Pulmonary:      Effort: Pulmonary effort is normal       Breath sounds: Normal breath sounds  Abdominal:      General: Bowel sounds are normal       Palpations: Abdomen is soft  Tenderness: There is no abdominal tenderness  Musculoskeletal: Normal range of motion  Right lower leg: No edema  Left lower leg: No edema  Skin:     General: Skin is warm  Findings: No rash  Neurological:      General: No focal deficit present  Mental Status: He is alert and oriented to person, place, and time  Psychiatric:         Mood and Affect: Mood normal          Behavior: Behavior normal          I spent 30 minutes with the patient today    More than 50% time spent for reviewing of external notes, reviewing of the results of diagnostics test, management of care, patient education and ordering of test

## 2021-04-27 NOTE — ASSESSMENT & PLAN NOTE
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

## 2021-04-27 NOTE — ASSESSMENT & PLAN NOTE
Patient states me that he had seen a urologist for elevated PSA in [facility]  I Do not have  his previous medical record to compare his PSA level  Patient said he will schedule to see same urologist in [facility]

## 2021-04-27 NOTE — ASSESSMENT & PLAN NOTE
Cholesterol decreased from 263-237, LDL decreased from 187-166  Still elevated discussed with the patient to start medication but he refused to start any medications  Will continue to watch diet and try to lose weight    Advised for low-cholesterol low saturated fat diet

## 2021-04-27 NOTE — PATIENT INSTRUCTIONS
Patient advised to continue present medications discussed with the patient medications and laboratory data in detail  Follow-up with me in 3 months    If any blood test was ordered please do 1 week prior to next appointment  If you have any questions please call the office [phone]

## 2021-05-07 DIAGNOSIS — I10 ESSENTIAL HYPERTENSION: ICD-10-CM

## 2021-05-07 DIAGNOSIS — I10 ESSENTIAL (PRIMARY) HYPERTENSION: ICD-10-CM

## 2021-05-07 RX ORDER — AMLODIPINE BESYLATE 5 MG/1
TABLET ORAL
Qty: 90 TABLET | Refills: 1 | Status: SHIPPED | OUTPATIENT
Start: 2021-05-07 | End: 2021-10-26 | Stop reason: SDUPTHER

## 2021-05-17 ENCOUNTER — OFFICE VISIT (OUTPATIENT)
Dept: ENDOCRINOLOGY | Facility: CLINIC | Age: 56
End: 2021-05-17
Payer: COMMERCIAL

## 2021-05-17 VITALS
BODY MASS INDEX: 36.75 KG/M2 | SYSTOLIC BLOOD PRESSURE: 136 MMHG | HEART RATE: 68 BPM | DIASTOLIC BLOOD PRESSURE: 88 MMHG | HEIGHT: 70 IN | TEMPERATURE: 99.6 F | WEIGHT: 256.7 LBS

## 2021-05-17 DIAGNOSIS — E01.0 THYROMEGALY: ICD-10-CM

## 2021-05-17 DIAGNOSIS — G47.09 OTHER INSOMNIA: ICD-10-CM

## 2021-05-17 DIAGNOSIS — R63.5 WEIGHT GAIN: ICD-10-CM

## 2021-05-17 DIAGNOSIS — R79.89 LOW TSH LEVEL: Primary | ICD-10-CM

## 2021-05-17 DIAGNOSIS — R73.03 PRE-DIABETES: ICD-10-CM

## 2021-05-17 PROCEDURE — 3079F DIAST BP 80-89 MM HG: CPT | Performed by: INTERNAL MEDICINE

## 2021-05-17 PROCEDURE — 1036F TOBACCO NON-USER: CPT | Performed by: INTERNAL MEDICINE

## 2021-05-17 PROCEDURE — 99214 OFFICE O/P EST MOD 30 MIN: CPT | Performed by: INTERNAL MEDICINE

## 2021-05-17 PROCEDURE — 3008F BODY MASS INDEX DOCD: CPT | Performed by: INTERNAL MEDICINE

## 2021-05-17 PROCEDURE — 3075F SYST BP GE 130 - 139MM HG: CPT | Performed by: INTERNAL MEDICINE

## 2021-05-17 NOTE — PATIENT INSTRUCTIONS
Please get labs done as ordered   Avoid biotin for 2-3 days prior to the lab work, you can take it after tests have been done   Follow-up in 2-3 months

## 2021-05-17 NOTE — PROGRESS NOTES
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  Will be trying melatonin to see if it helps   -  Will repeat TSH along with free T3, free T4, check thyroid antibodies -TSI, TPO, TG antibody   -  Discussed indications for treatment of subclinical hyperthyroidism     --Reviewed pathophysiology of hyperthyroidism, reviewing the hypothalamic-ptiuitary-thyroid axis and interpretation and significance of TSH, FT4, FT3 values  --Discussed the use of HIGGINS and scan to evaluate cause of hyperthyroidism  --Discussed possible etiologies of hyperthyroidism     3  Pre diabetes  4  Obesity/ weight gain   -Consistent carb diet as discussed, regular exercise       CC:  Hyperthyroidism    History of Present Illness     HPI:  Keven Montes De Oca is a 55 y o  male presents for evaluation of hyperthyroidism  noted to have low TSH levels on labs   Per primary care physician's notes, labs 12/2020 TSH is 0 25, but  free T3 is 3 64 normal and free T4 is also normal 1 17     Associated signs/symptoms due to hyperthyroidism:     Energy levels are good  Gained 25 lbs in the last few years, difficulty to loose weight   Does not sleep well - difficulty in falling asleep and wakes up multiple times at night   Shift worker, 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    complains of lowappetite  No heat intolerance/No  cold intolerance  No  diarrhea/No constipation  No sweating/No  Tremor/no palpitation   No dry skin/ No  brittle nails   no eye complaints  Swelling in the neck: No  Obstructive symptoms: no trouble breathing, swallowing or hoarseness in voice     No h/o Iodine exposure  Family history of thyroid disease: no     Taking Vitamin D3 10, 000 IU weekly     Pre-diabetes   GM , uncle h/o diabetes mellitus   Trying to eat better ;  Very active at work, no additional exercise    Started taking biotin supplements 1 week ago     All other systems were reviewed and were negative    Review of Systems    Historical Information   Past Medical History:   Diagnosis Date    BMI 36 0-36 9,adult 4/27/2021    BMI 37 0-37 9, adult 1/26/2021    Bradycardia     Elevated PSA 4/27/2021    Hypercholesteremia 1/23/2021    Hyperglycemia 1/23/2021    Hyperplastic polyp of sigmoid colon 4/27/2021    Low TSH level 1/23/2021    Other insomnia 4/27/2021    Weight gain     Weight gain 1/23/2021     Past Surgical History:   Procedure Laterality Date    COLONOSCOPY  2013    [place], [place]     HEMORROIDECTOMY      [id number]     HERNIA REPAIR Bilateral     In childhood     SHOULDER SURGERY      Pain - impingement     Social History   Social History     Substance and Sexual Activity   Alcohol Use Yes    Comment: Occasional      Social History     Substance and Sexual Activity   Drug Use Never    Comment: No dug use - As per [employer]     Social History     Tobacco Use   Smoking Status Never Smoker   Smokeless Tobacco Never Used     Family History:   Family History   Problem Relation Age of Onset    Hypertension Mother     Other Father         pacemaker     Hypertension Father     Prostate cancer Father         diagnosed in his 62s     Prostate cancer Family        Meds/Allergies   Current Outpatient Medications   Medication Sig Dispense Refill    amLODIPine (NORVASC) 5 mg tablet TAKE 1 TABLET BY MOUTH EVERY DAY 90 tablet 1    Blood Pressure Monitoring (Blood Pressure Kit) KIT Use daily Adult 1 each 0    cholecalciferol (VITAMIN D3) 1,000 units tablet Take by mouth daily 10 000 iu      CINNAMON PO Take by mouth daily      Multiple Vitamin (multivitamin) capsule Take 1 capsule by mouth daily      Omega-3 Fatty Acids (Fish Oil) 1000 MG CPDR Take by mouth daily       No current facility-administered medications for this visit        Allergies   Allergen Reactions  Penicillins Swelling       Objective   Vitals: Blood pressure 136/88, pulse 68, temperature 99 6 °F (37 6 °C), height 5' 10" (1 778 m), weight 116 kg (256 lb 11 2 oz)  Physical Exam  Constitutional:       General: He is not in acute distress  Appearance: He is well-developed  He is not diaphoretic  HENT:      Head: Normocephalic and atraumatic  Eyes:      Conjunctiva/sclera: Conjunctivae normal       Pupils: Pupils are equal, round, and reactive to light  Neck:      Musculoskeletal: Normal range of motion and neck supple  Comments:  Thyroid gland mildly enlarged, nontender, no nodules appreciated on palpation  Cardiovascular:      Rate and Rhythm: Normal rate and regular rhythm  Heart sounds: Normal heart sounds  No murmur  Pulmonary:      Effort: Pulmonary effort is normal  No respiratory distress  Breath sounds: Normal breath sounds  No wheezing  Abdominal:      General: There is no distension  Palpations: Abdomen is soft  Tenderness: There is no abdominal tenderness  There is no guarding  Skin:     General: Skin is warm and dry  Findings: No erythema or rash  Neurological:      Mental Status: He is alert and oriented to person, place, and time  Deep Tendon Reflexes: Reflexes normal       Comments: No tremors on the outstretched arms     Psychiatric:         Behavior: Behavior normal          Thought Content: Thought content normal          The history was obtained from the review of the chart, patient  Lab Results:      No results found for: TSH, I9BXGBK, S5KZBCT     TSH 0 28  (4/2021)      Lab Results   Component Value Date    BUN 9 05/04/2020    K 3 8 05/04/2020     05/04/2020    CO2 29 05/04/2020        Lab Results   Component Value Date    CALCIUM 8 9 05/04/2020        Imaging Studies:        I have personally reviewed pertinent reports  Portions of the record may have been created with voice recognition software   Occasional wrong word or "sound a like" substitutions may have occurred due to the inherent limitations of voice recognition software  Read the chart carefully and recognize, using context, where substitutions have occurred

## 2021-08-24 ENCOUNTER — OFFICE VISIT (OUTPATIENT)
Dept: ENDOCRINOLOGY | Facility: CLINIC | Age: 56
End: 2021-08-24
Payer: COMMERCIAL

## 2021-08-24 VITALS
WEIGHT: 260 LBS | HEIGHT: 70 IN | HEART RATE: 59 BPM | SYSTOLIC BLOOD PRESSURE: 150 MMHG | BODY MASS INDEX: 37.22 KG/M2 | TEMPERATURE: 97 F | DIASTOLIC BLOOD PRESSURE: 90 MMHG

## 2021-08-24 DIAGNOSIS — E55.9 VITAMIN D DEFICIENCY: ICD-10-CM

## 2021-08-24 DIAGNOSIS — R73.03 PRE-DIABETES: ICD-10-CM

## 2021-08-24 DIAGNOSIS — E01.0 THYROMEGALY: ICD-10-CM

## 2021-08-24 DIAGNOSIS — E05.90 SUBCLINICAL HYPERTHYROIDISM: Primary | ICD-10-CM

## 2021-08-24 PROCEDURE — 3008F BODY MASS INDEX DOCD: CPT | Performed by: INTERNAL MEDICINE

## 2021-08-24 PROCEDURE — 1036F TOBACCO NON-USER: CPT | Performed by: INTERNAL MEDICINE

## 2021-08-24 PROCEDURE — 99214 OFFICE O/P EST MOD 30 MIN: CPT | Performed by: INTERNAL MEDICINE

## 2021-08-24 NOTE — PATIENT INSTRUCTIONS
You are being referred for an ultrasound of the thyroid   Please repeat lab work- thyroid function test in 3-4 months, earlier if you develop any symptoms of hypo or hyperthyroidism   Continue vitamin-D supplementation   Follow-up in 6 months

## 2021-08-24 NOTE — PROGRESS NOTES
Amaya Lew 55 y o  male MRN: [id number]    Encounter: [id number]      Assessment/Plan     1  Subclinical hyperthyroidism   2  Thyromegaly   Clinically euthyroid  Thyroid antibodies negative   - ultrasound thyroid to assess for nodules  -repeat thyroid function tests in 3-4 months, earlier if patient has symptoms of hypo or hyperthyroidism    3  Vitamin-D deficiency-continue supplementation   Check vitamin-D level with next set of labs     4  History of pre diabetes-continue lifestyle modifications, check A1c in 3 months    Follow-up in 6 months       CC:  Subclinical hyperthyroidism    History of Present Illness     HPI:  Amaya Lew is a 55 y o  male who is here for a follow-up of subclinical hyperthyroidism  Also has history of prediabetes, obesity, vitamin-D deficiency    Last seen in 05/17/2021   Initially was noted to have low TSH levels on labs, free T3, T4 within normal limits   Did not have repeat blood work done now  Similar labs with TSH suppressed, free T3, T4 within normal limits  Thyroid antibodies negative     Sleeping better   Energy levels are good   Does not have any symptoms of hypo or hyperthyroidism    Still taking biotin, did not take for a few days before labs   Taking vitamin-D3 10,000 IU orally weekly     All other systems were reviewed and are negative         Review of Systems    Historical Information   Past Medical History:   Diagnosis Date    BMI 36 0-36 9,adult 4/27/2021    BMI 37 0-37 9, adult 1/26/2021    Bradycardia     Elevated PSA 4/27/2021    Hypercholesteremia 1/23/2021    Hyperglycemia 1/23/2021    Hyperplastic polyp of sigmoid colon 4/27/2021    Low TSH level 1/23/2021    Other insomnia 4/27/2021    Weight gain     Weight gain 1/23/2021     Past Surgical History:   Procedure Laterality Date    COLONOSCOPY  2013    [place], [place]     HEMORROIDECTOMY      [id number]     HERNIA REPAIR Bilateral     In childhood     SHOULDER SURGERY      Pain - impingement Social History   Social History     Substance and Sexual Activity   Alcohol Use Yes    Comment: Occasional      Social History     Substance and Sexual Activity   Drug Use Never    Comment: No dug use - As per [employer]     Social History     Tobacco Use   Smoking Status Never Smoker   Smokeless Tobacco Never Used     Family History:   Family History   Problem Relation Age of Onset    Hypertension Mother     Other Father         pacemaker     Hypertension Father     Prostate cancer Father         diagnosed in his 62s     Prostate cancer Family        Meds/Allergies   Current Outpatient Medications   Medication Sig Dispense Refill    amLODIPine (NORVASC) 5 mg tablet TAKE 1 TABLET BY MOUTH EVERY DAY 90 tablet 1    Blood Pressure Monitoring (Blood Pressure Kit) KIT Use daily Adult 1 each 0    cholecalciferol (VITAMIN D3) 1,000 units tablet Take by mouth daily 10 000 iu      CINNAMON PO Take by mouth daily      Multiple Vitamin (multivitamin) capsule Take 1 capsule by mouth daily      Omega-3 Fatty Acids (Fish Oil) 1000 MG CPDR Take by mouth daily       No current facility-administered medications for this visit  Allergies   Allergen Reactions    Penicillins Swelling       Objective   Vitals: Blood pressure 150/90, pulse 59, temperature (!) 97 °F (36 1 °C), height 5' 10" (1 778 m), weight 118 kg (260 lb)  Physical Exam  Constitutional:       General: He is not in acute distress  Appearance: He is well-developed  He is not diaphoretic  HENT:      Head: Normocephalic and atraumatic  Eyes:      Conjunctiva/sclera: Conjunctivae normal       Pupils: Pupils are equal, round, and reactive to light  Neck:      Comments: Thyromegaly +, left appears to be more full as compared to the right, non tender  No nodules appreciated on palpation     Cardiovascular:      Rate and Rhythm: Normal rate and regular rhythm  Heart sounds: Normal heart sounds  No murmur heard       Pulmonary: Effort: Pulmonary effort is normal  No respiratory distress  Breath sounds: Normal breath sounds  No wheezing  Abdominal:      General: There is no distension  Palpations: Abdomen is soft  Tenderness: There is no abdominal tenderness  There is no guarding  Musculoskeletal:      Cervical back: Normal range of motion and neck supple  Skin:     General: Skin is warm and dry  Findings: No erythema or rash  Neurological:      Mental Status: He is alert and oriented to person, place, and time  Deep Tendon Reflexes: Reflexes normal       Comments: No tremors on the outstretched arms     Psychiatric:         Behavior: Behavior normal          Thought Content: Thought content normal          The history was obtained from the review of the chart, patient  Lab Results:      Lab Results   Component Value Date    WBC 6 16 05/04/2020    HGB 15 1 05/04/2020    HCT 46 2 05/04/2020    MCV 92 05/04/2020     05/04/2020     Lab Results   Component Value Date    CREATININE 1 11 05/04/2020    BUN 9 05/04/2020    K 3 8 05/04/2020     05/04/2020    CO2 29 05/04/2020     Lab Results   Component Value Date    HGBA1C 6 1 04/20/2021         Imaging Studies:       I have personally reviewed pertinent reports  Portions of the record may have been created with voice recognition software  Occasional wrong word or "sound a like" substitutions may have occurred due to the inherent limitations of voice recognition software  Read the chart carefully and recognize, using context, where substitutions have occurred

## 2021-08-30 ENCOUNTER — HOSPITAL ENCOUNTER (OUTPATIENT)
Dept: ULTRASOUND IMAGING | Facility: HOSPITAL | Age: 56
Discharge: HOME | End: 2021-08-30
Attending: INTERNAL MEDICINE
Payer: COMMERCIAL

## 2021-08-30 DIAGNOSIS — E55.9 VITAMIN D DEFICIENCY: ICD-10-CM

## 2021-08-30 DIAGNOSIS — E05.90 SUBCLINICAL HYPERTHYROIDISM: ICD-10-CM

## 2021-08-30 DIAGNOSIS — E01.0 THYROMEGALY: ICD-10-CM

## 2021-08-30 PROCEDURE — 76536 US EXAM OF HEAD AND NECK: CPT

## 2021-09-13 ENCOUNTER — TELEPHONE (OUTPATIENT)
Dept: ENDOCRINOLOGY | Facility: CLINIC | Age: 56
End: 2021-09-13

## 2021-09-13 NOTE — TELEPHONE ENCOUNTER
----- Message from Tori Denson MD sent at 9/10/2021  3:51 PM EDT -----   Please call inform patient of results  Has multiple thyroid nodules  One or more of these nodules could be hyperactive causing subclinical hyperthyroidism   The nodules do not have any concerning ultrasound features, does not need biopsy at this time however I do recommend repeat thyroid ultrasound in 1 year

## 2021-09-13 NOTE — TELEPHONE ENCOUNTER
Spoke with patient and reviewed ultrasound results  He understood  Mailed order to be done in 1 year

## 2021-10-26 ENCOUNTER — OFFICE VISIT (OUTPATIENT)
Dept: INTERNAL MEDICINE CLINIC | Facility: CLINIC | Age: 56
End: 2021-10-26
Payer: COMMERCIAL

## 2021-10-26 VITALS
DIASTOLIC BLOOD PRESSURE: 82 MMHG | WEIGHT: 262 LBS | HEART RATE: 65 BPM | SYSTOLIC BLOOD PRESSURE: 126 MMHG | OXYGEN SATURATION: 98 % | TEMPERATURE: 98.7 F | BODY MASS INDEX: 37.51 KG/M2 | HEIGHT: 70 IN

## 2021-10-26 DIAGNOSIS — R73.03 PREDIABETES: ICD-10-CM

## 2021-10-26 DIAGNOSIS — E05.90 SUBCLINICAL HYPERTHYROIDISM: ICD-10-CM

## 2021-10-26 DIAGNOSIS — Z00.00 ANNUAL PHYSICAL EXAM: Primary | ICD-10-CM

## 2021-10-26 DIAGNOSIS — K63.5 HYPERPLASTIC POLYP OF SIGMOID COLON: ICD-10-CM

## 2021-10-26 DIAGNOSIS — R97.20 ELEVATED PSA: ICD-10-CM

## 2021-10-26 DIAGNOSIS — E78.00 HYPERCHOLESTEREMIA: ICD-10-CM

## 2021-10-26 DIAGNOSIS — I10 ESSENTIAL HYPERTENSION: ICD-10-CM

## 2021-10-26 PROBLEM — R73.9 HYPERGLYCEMIA: Status: RESOLVED | Noted: 2021-01-23 | Resolved: 2021-10-26

## 2021-10-26 PROCEDURE — 99396 PREV VISIT EST AGE 40-64: CPT | Performed by: INTERNAL MEDICINE

## 2021-10-26 PROCEDURE — 99213 OFFICE O/P EST LOW 20 MIN: CPT | Performed by: INTERNAL MEDICINE

## 2021-10-26 PROCEDURE — 3725F SCREEN DEPRESSION PERFORMED: CPT | Performed by: INTERNAL MEDICINE

## 2021-10-26 PROCEDURE — 3008F BODY MASS INDEX DOCD: CPT | Performed by: INTERNAL MEDICINE

## 2021-10-26 RX ORDER — AMLODIPINE BESYLATE 5 MG/1
5 TABLET ORAL DAILY
Qty: 90 TABLET | Refills: 1 | Status: SHIPPED | OUTPATIENT
Start: 2021-10-26 | End: 2022-04-21

## 2021-12-03 ENCOUNTER — TELEPHONE (OUTPATIENT)
Dept: GASTROENTEROLOGY | Facility: CLINIC | Age: 56
End: 2021-12-03

## 2021-12-03 ENCOUNTER — PREP FOR PROCEDURE (OUTPATIENT)
Dept: GASTROENTEROLOGY | Facility: CLINIC | Age: 56
End: 2021-12-03

## 2021-12-03 DIAGNOSIS — Z12.11 SCREENING FOR COLON CANCER: Primary | ICD-10-CM

## 2022-01-17 ENCOUNTER — HOSPITAL ENCOUNTER (OUTPATIENT)
Dept: GASTROENTEROLOGY | Facility: AMBULATORY SURGERY CENTER | Age: 57
Discharge: HOME | End: 2022-01-17
Payer: COMMERCIAL

## 2022-01-17 ENCOUNTER — ANESTHESIA (OUTPATIENT)
Dept: GASTROENTEROLOGY | Facility: AMBULATORY SURGERY CENTER | Age: 57
End: 2022-01-17

## 2022-01-17 ENCOUNTER — ANESTHESIA EVENT (OUTPATIENT)
Dept: GASTROENTEROLOGY | Facility: AMBULATORY SURGERY CENTER | Age: 57
End: 2022-01-17

## 2022-01-17 VITALS
RESPIRATION RATE: 18 BRPM | HEIGHT: 70 IN | TEMPERATURE: 99.3 F | HEART RATE: 63 BPM | SYSTOLIC BLOOD PRESSURE: 132 MMHG | OXYGEN SATURATION: 98 % | DIASTOLIC BLOOD PRESSURE: 91 MMHG | BODY MASS INDEX: 37.22 KG/M2 | WEIGHT: 260 LBS

## 2022-01-17 DIAGNOSIS — Z12.11 SCREENING FOR COLON CANCER: ICD-10-CM

## 2022-01-17 PROCEDURE — 45380 COLONOSCOPY AND BIOPSY: CPT | Performed by: INTERNAL MEDICINE

## 2022-01-17 PROCEDURE — 88305 TISSUE EXAM BY PATHOLOGIST: CPT | Performed by: PATHOLOGY

## 2022-01-17 PROCEDURE — 00811 ANES LWR INTST NDSC NOS: CPT | Performed by: NURSE ANESTHETIST, CERTIFIED REGISTERED

## 2022-01-17 RX ORDER — PROPOFOL 10 MG/ML
INJECTION, EMULSION INTRAVENOUS AS NEEDED
Status: DISCONTINUED | OUTPATIENT
Start: 2022-01-17 | End: 2022-01-17

## 2022-01-17 RX ORDER — CHOLECALCIFEROL (VITAMIN D3) 50 MCG
TABLET ORAL
COMMUNITY
Start: 2022-01-03

## 2022-01-17 RX ORDER — SODIUM CHLORIDE 9 MG/ML
INJECTION, SOLUTION INTRAVENOUS CONTINUOUS PRN
Status: DISCONTINUED | OUTPATIENT
Start: 2022-01-17 | End: 2022-01-17

## 2022-01-17 RX ADMIN — PROPOFOL 100 MG: 10 INJECTION, EMULSION INTRAVENOUS at 08:12

## 2022-01-17 RX ADMIN — PROPOFOL 100 MG: 10 INJECTION, EMULSION INTRAVENOUS at 08:18

## 2022-01-17 RX ADMIN — PROPOFOL 100 MG: 10 INJECTION, EMULSION INTRAVENOUS at 08:08

## 2022-01-17 RX ADMIN — SODIUM CHLORIDE: 9 INJECTION, SOLUTION INTRAVENOUS at 08:06

## 2022-01-17 RX ADMIN — PROPOFOL 50 MG: 10 INJECTION, EMULSION INTRAVENOUS at 08:15

## 2022-01-17 RX ADMIN — PROPOFOL 100 MG: 10 INJECTION, EMULSION INTRAVENOUS at 08:33

## 2022-01-17 RX ADMIN — PROPOFOL 100 MG: 10 INJECTION, EMULSION INTRAVENOUS at 08:27

## 2022-01-17 RX ADMIN — PROPOFOL 50 MG: 10 INJECTION, EMULSION INTRAVENOUS at 08:23

## 2022-01-17 NOTE — H&P
History and Physical - [facility]  Mey Muro 65 y o  male MRN: [id number]                  HPI: Mey Muro is a 65y o  year old male who presents for surveillance colonoscopy      REVIEW OF SYSTEMS: Per the HPI, and otherwise unremarkable      Historical Information   Past Medical History:   Diagnosis Date    BMI 36 0-36 9,adult 04/27/2021    BMI 37 0-37 9, adult 01/26/2021    Bradycardia     Elevated PSA 04/27/2021    Hypercholesteremia 01/23/2021    Hyperglycemia 01/23/2021    Hyperplastic polyp of sigmoid colon 04/27/2021    Hypertension     Low TSH level 01/23/2021    Other insomnia 04/27/2021    Prediabetes 10/26/2021    Weight gain 01/23/2021     Past Surgical History:   Procedure Laterality Date    COLONOSCOPY  2013    [place], [place]     HEMORROIDECTOMY      [id number]     HERNIA REPAIR Bilateral     In childhood     SHOULDER SURGERY Right     Pain - impingement    WISDOM TOOTH EXTRACTION       Social History   Social History     Substance and Sexual Activity   Alcohol Use Yes    Comment: Occasional      Social History     Substance and Sexual Activity   Drug Use Never    Comment: No dug use - As per [employer]     Social History     Tobacco Use   Smoking Status Never Smoker   Smokeless Tobacco Never Used     Family History   Problem Relation Age of Onset    Hypertension Mother     Other Father         pacemaker     Hypertension Father     Prostate cancer Father         diagnosed in his 62s     Prostate cancer Family        Meds/Allergies       Current Outpatient Medications:     amLODIPine (NORVASC) 5 mg tablet    Cholecalciferol (D3) 50 MCG (2000 UT) TABS    CINNAMON PO    Multiple Vitamin (multivitamin) capsule    Omega-3 Fatty Acids (Fish Oil) 1000 MG CPDR    Blood Pressure Monitoring (Blood Pressure Kit) KIT    Cholecalciferol (Vitamin D-3) 125 MCG (5000 UT) TABS    Allergies   Allergen Reactions    Penicillins Swelling       Objective     BP 150/87   Pulse 73   Temp 99 3 °F (37 4 °C) (Temporal)   Resp 18   Ht 5' 10" (1 778 m)   Wt 118 kg (260 lb)   SpO2 97%   BMI 37 31 kg/m²       PHYSICAL EXAM    Gen: NAD  Head: NCAT  CV: RRR  CHEST: Clear  ABD: soft, NT/ND  EXT: no edema      ASSESSMENT/PLAN:  This is a 65y o  year old male here for surveillance colonoscopy history of polyps, and he is stable and optimized for his procedure

## 2022-01-17 NOTE — DISCHARGE INSTRUCTIONS
Colonoscopy   WHAT YOU NEED TO KNOW:   A colonoscopy is a procedure to examine the inside of your colon (intestine) with a scope  Polyps or tissue growths may have been removed during your colonoscopy  It is normal to feel bloated and to have some abdominal discomfort  You should be passing gas  If you have hemorrhoids or you had polyps removed, you may have a small amount of bleeding  DISCHARGE INSTRUCTIONS:   Seek care immediately if:    You have sudden, severe abdominal pain   You have problems swallowing   You have a large amount of black, sticky bowel movements or blood in your bowel movements   You have sudden trouble breathing   You feel weak, lightheaded, or faint or your heart beats faster than normal for you  Contact your healthcare provider if:    You have a fever and chills   You have nausea or are vomiting   Your abdomen is bloated or feels full and hard   You have abdominal pain   You have black, sticky bowel movements or blood in your bowel movements   You have not had a bowel movement for 3 days after your procedure   You have rash or hives   You have questions or concerns about your procedure  Activity:    Do not lift, strain, or run for 24 hours after your procedure   Rest after your procedure  You have been given medicine to relax you  Do not drive or make important decisions until the day after your procedure  Return to your normal activity as directed   Relieve gas and discomfort from bloating by lying on your right side with a heating pad on your abdomen  You may need to take short walks to help the gas move out  Eat small meals until bloating is relieved  Follow up with your healthcare provider as directed: Write down your questions so you remember to ask them during your visits  If you take a blood thinner, please review the specific instructions from your endoscopist about when you should resume it   These can be found in the Recommendation and Your Medication list sections of this After Visit Summary  High Fiber Diet   WHAT YOU NEED TO KNOW:   What is a high-fiber diet? A high-fiber diet includes foods that have a high amount of fiber  Fiber is the part of fruits, vegetables, and grains that is not broken down by your body  Fiber keeps your bowel movements regular  Fiber can also help lower your cholesterol level, control blood sugar in people with diabetes, and relieve constipation  Fiber can also help you control your weight because it helps you feel full faster  Most adults should eat 25 to 35 grams of fiber each day  Talk to your dietitian or healthcare provider about the amount of fiber you need  What foods are good sources of fiber? · Foods with at least 4 grams of fiber per serving:      ? ? to ½ cup of high-fiber cereal (check the nutrition label on the box)    ? ½ cup of blackberries or raspberries    ? 4 dried prunes    ? 1 cooked artichoke    ? ½ cup of cooked legumes, such as lentils, or red, kidney, and christopher beans    · Foods with 1 to 3 grams of fiber per serving:      ? 1 slice of whole-wheat, pumpernickel, or rye bread    ? ½ cup of cooked brown rice    ? 4 whole-wheat crackers    ? 1 cup of oatmeal    ? ½ cup of cereal with 1 to 3 grams of fiber per serving (check the nutrition label on the box)    ? 1 small piece of fruit, such as an apple, banana, pear, kiwi, or orange    ? 3 dates    ? ½ cup of canned apricots, fruit cocktail, peaches, or pears    ? ½ cup of raw or cooked vegetables, such as carrots, cauliflower, cabbage, spinach, squash, or corn    What are some ways that I can increase fiber in my diet? · Choose brown or wild rice instead of white rice  · Use whole wheat flour in recipes instead of white or all-purpose flour  · Add beans and peas to casseroles or soups  · Choose fresh fruit and vegetables with peels or skins on instead of juices      What other guidelines should I follow? · Add fiber to your diet slowly  You may have abdominal discomfort, bloating, and gas if you add fiber to your diet too quickly  · Drink plenty of liquids as you add fiber to your diet  You may have nausea or develop constipation if you do not drink enough water  Ask how much liquid to drink each day and which liquids are best for you  CARE AGREEMENT:   You have the right to help plan your care  Discuss treatment options with your healthcare provider to decide what care you want to receive  You always have the right to refuse treatment  The above information is an  only  It is not intended as medical advice for individual conditions or treatments  Talk to your doctor, nurse or pharmacist before following any medical regimen to see if it is safe and effective for you  © Copyright [employer] 2021 Information is for End User's use only and may not be sold, redistributed or otherwise used for commercial purposes  All illustrations and images included in CareNotes® are the copyrighted property of A D A M , Inc  or [facility] Patience Hutchins   Hemorrhoids   WHAT YOU NEED TO KNOW:   What are hemorrhoids? Hemorrhoids are swollen blood vessels inside your rectum (internal hemorrhoids) or on your anus (external hemorrhoids)  Sometimes a hemorrhoid may prolapse  This means it extends out of your anus  What increases my risk for hemorrhoids? · Pregnancy or obesity    · Straining or sitting for a long time during bowel movements    · Liver disease    · Weak muscles around the anus caused by older age, rectal surgery, or anal intercourse    · A lack of physical activity    · Chronic diarrhea or constipation    · A low-fiber diet    What are the signs and symptoms of hemorrhoids?    · Pain or itching around your anus or inside your rectum    · Swelling or bumps around your anus    · Bright red blood in your bowel movement, on the toilet paper, or in the toilet bowl    · Tissue bulging out of your anus (prolapsed hemorrhoids)    · Incontinence (poor control over urine or bowel movements)    How are hemorrhoids diagnosed? Your healthcare provider will ask about your symptoms, the foods you eat, and your bowel movements  He or she will examine your anus for external hemorrhoids  You may need the following:  · A digital rectal exam  is a test to check for hemorrhoids  Your healthcare provider will put a gloved finger inside your anus to feel for the hemorrhoids  · An anoscopy  is a test that uses a scope (small tube with a light and camera on the end) to look at your hemorrhoids  How are hemorrhoids treated? Treatment will depend on your symptoms  You may need any of the following:  · Medicines  can help decrease pain and swelling, and soften your bowel movement  The medicine may be a pill, pad, cream, or ointment  · Procedures  may be used to shrink or remove your hemorrhoid  Examples include rubber-band ligation, sclerotherapy, and photocoagulation  These procedures may be done in your healthcare provider's office  Ask your healthcare provider for more information about these procedures  · Surgery  may be needed to shrink or remove your hemorrhoids  How can I manage my symptoms? · Apply ice on your anus for 15 to 20 minutes every hour or as directed  Use an ice pack, or put crushed ice in a plastic bag  Cover it with a towel before you apply it to your anus  Ice helps prevent tissue damage and decreases swelling and pain  · Take a sitz bath  Fill a bathtub with 4 to 6 inches of warm water  You may also use a sitz bath pan that fits inside a toilet bowl  Sit in the sitz bath for 15 minutes  Do this 3 times a day, and after each bowel movement  The warm water can help decrease pain and swelling  · Keep your anal area clean  Gently wash the area with warm water daily  Soap may irritate the area  After a bowel movement, wipe with moist towelettes or wet toilet paper   Dry toilet paper can irritate the area  How can I help prevent hemorrhoids? · Do not strain to have a bowel movement  Do not sit on the toilet too long  These actions can increase pressure on the tissues in your rectum and anus  · Drink plenty of liquids  Liquids can help prevent constipation  Ask how much liquid to drink each day and which liquids are best for you  · Eat a variety of high-fiber foods  Examples include fruits, vegetables, and whole grains  Ask your healthcare provider how much fiber you need each day  You may need to take a fiber supplement  · Exercise as directed  Exercise, such as walking, may make it easier to have a bowel movement  Ask your healthcare provider to help you create an exercise plan  · Do not have anal sex  Anal sex can weaken the skin around your rectum and anus  · Avoid heavy lifting  This can cause straining and increase your risk for another hemorrhoid  When should I seek immediate care? · You have severe pain in your rectum or around your anus  · You have severe pain in your abdomen and you are vomiting  · You have bleeding from your anus that soaks through your underwear  When should I contact my healthcare provider? · You have frequent and painful bowel movements  · Your hemorrhoid looks or feels more swollen than usual      · You do not have a bowel movement for 2 days or more  · You see or feel tissue coming through your anus  · You have questions or concerns about your condition or care  CARE AGREEMENT:   You have the right to help plan your care  Learn about your health condition and how it may be treated  Discuss treatment options with your healthcare providers to decide what care you want to receive  You always have the right to refuse treatment  The above information is an  only  It is not intended as medical advice for individual conditions or treatments   Talk to your doctor, nurse or pharmacist before following any medical regimen to see if it is safe and effective for you  © Copyright [employer] 2021 Information is for End User's use only and may not be sold, redistributed or otherwise used for commercial purposes  All illustrations and images included in CareNotes® are the copyrighted property of A D A M , Inc  or Sofía Harris  Colorectal Polyps   WHAT YOU NEED TO KNOW:   What are colorectal polyps? Colorectal polyps are small growths of tissue in the lining of the colon and rectum  Most polyps are usually benign (not cancer)  Certain types of polyps, called adenomatous polyps, may turn into cancer  What increases my risk for colorectal polyps? The exact cause of colorectal polyps is unknown  The following may increase your risk:  · Older age    · Foods high in fat and low in fiber    · Family history of polyps    · Intestinal diseases, such as Crohn disease or ulcerative colitis    · Smoking cigarettes or drinking alcohol    · Lack of physical activity, such as exercise    · Obesity    What are the signs and symptoms of colorectal polyps? · Blood in your bowel movement or bleeding from the rectum    · Change in bowel movement habits, such as diarrhea or constipation    · Abdominal pain    What do I need to know about colorectal polyp screening and diagnosis? Screening means you are checked for polyps that may be cancer, even if you do not have signs or symptoms  Screening is recommended starting at age 48 and continuing to age 76 if you are at average risk  Your healthcare provider may suggest screening starting at age 39  Screening may start before you are 45 or continue after you are 75 if your risk is high  Your provider will tell you how often to get screened  Timing depends on the type of screening and if polyps are found  Timing also depends on your age and if you are at increased risk for cancer  Screening may be recommended every 1, 2, 5, or 10 years   Your healthcare provider will need to test polyps to find out if they are cancer  Any of the following may be used to find polyps:  · A digital rectal exam  means your provider will use a finger to check for polyps  · A barium enema  is an x-ray of the colon  A tube is put into your anus, and a liquid called barium is put through the tube  Barium is used so that healthcare providers can see your colon better on the x-ray film  · A virtual colonoscopy  is a CT scan that takes pictures of the inside of your colon and rectum  A small, flexible tube is put into your rectum and air or carbon dioxide (gas) is used to expand your colon  This lets healthcare providers clearly see your colon and any polyps on a monitor  · Colonoscopy or sigmoidoscopy  are procedures to help your provider see the inside of your colon  He or she will use a flexible tube with a small light and camera on the end  During a sigmoidoscopy, your provider will only look at rectum and lower colon  During a colonoscopy, he or she will look at the full length of your colon  A small amount of tissue may be removed from your colon for tests  How are colorectal polyps treated? Small, benign polyps may not need treatment  Your healthcare provider will check the polyp over time to make sure it is not changing  Polyps that are cancer may be removed with one of the following:  · A polypectomy  is a minimally invasive procedure to remove a polyp during a colonoscopy or sigmoidoscopy  Your healthcare provider may need to remove the polyp with a laparoscope  Laparoscopy is done by inserting a small, flexible scope into incisions made on your abdomen  · Surgery  may be needed to remove large or deep polyps that cannot be removed in a polypectomy  Tissues or lymph nodes near a polyp may also be removed  This helps stop cancer from spreading  What can I do to lower my risk for colorectal polyps? · Eat a variety of healthy foods    Healthy foods include fruit, vegetables, whole-grain breads, low-fat dairy products, beans, lean meat, and fish  Ask if you need to be on a special diet  · Maintain a healthy weight  Ask your healthcare provider what a healthy weight is for you  Ask him or her to help with a weight loss plan if you are overweight  · Exercise as directed  Begin to exercise slowly and do more as you get stronger  Talk with your healthcare provider before you start an exercise program          · Limit alcohol  Your risk for polyps increases the more you drink  · Do not smoke  Nicotine and other chemicals in cigarettes and cigars increase your risk for polyps  Ask your healthcare provider for information if you currently smoke and need help to quit  E-cigarettes or smokeless tobacco still contain nicotine  Talk to your healthcare provider before you use these products  Where can I find more information? · Christelle Mcmillan ([facility])  [address], [place] [address]  Phone: [phone]  Web Address: Gearline Pen  [facility] gov    Call your local emergency number (911 in the [address] [facility]) if:   · You have sudden shortness of breath  · You have a fast heart rate, fast breathing, or are too dizzy to stand up  When should I seek immediate care? · You have severe abdominal pain  · You see blood in your bowel movement  When should I call my doctor? · You have a fever  · You have chills, a cough, or feel weak and achy  · You have abdominal pain that does not go away or gets worse after you take medicine  · Your abdomen is swollen  · You are losing weight without trying  · You have questions or concerns about your condition or care  CARE AGREEMENT:   You have the right to help plan your care  Learn about your health condition and how it may be treated  Discuss treatment options with your healthcare providers to decide what care you want to receive   You always have the right to refuse treatment  The above information is an  only  It is not intended as medical advice for individual conditions or treatments  Talk to your doctor, nurse or pharmacist before following any medical regimen to see if it is safe and effective for you  © Copyright [employer] 2021 Information is for End User's use only and may not be sold, redistributed or otherwise used for commercial purposes   All illustrations and images included in CareNotes® are the copyrighted property of A D A M , Inc  or [address] [place] [address]

## 2022-01-17 NOTE — ANESTHESIA POSTPROCEDURE EVALUATION
Post-Op Assessment Note    CV Status:  Stable  Pain Score: 0    Pain management: adequate     Mental Status:  Sleepy   Hydration Status:  Stable   PONV Controlled:  None   Airway Patency:  Patent   Two or more mitigation strategies used for obstructive sleep apnea   Post Op Vitals Reviewed: Yes      Staff: CRNA         No complications documented      BP      Temp      Pulse     Resp      SpO2   95

## 2022-01-17 NOTE — ANESTHESIA PREPROCEDURE EVALUATION
Procedure:  COLONOSCOPY    Relevant Problems   CARDIO   (+) Essential hypertension   (+) Hypercholesteremia      ENDO   (+) Subclinical hyperthyroidism        Physical Exam    Airway    Mallampati score: III  TM Distance: >3 FB  Neck ROM: full     Dental   No notable dental hx     Cardiovascular  Cardiovascular exam normal    Pulmonary  Pulmonary exam normal     Other Findings        Anesthesia Plan  ASA Score- 2     Anesthesia Type- IV sedation with anesthesia with ASA Monitors  Additional Monitors:   Airway Plan:           Plan Factors-Exercise tolerance (METS): >4 METS  Chart reviewed  Patient summary reviewed  Patient is not a current smoker  Obstructive sleep apnea risk education given perioperatively  Induction-     Postoperative Plan-     Informed Consent-   I personally reviewed this patient with the CRNA  Discussed and agreed on the Anesthesia Plan with the DAVID Do

## 2022-01-19 NOTE — PROGRESS NOTES
1/24/2022    [employer]  1965  [id number]      Assessment  -Elevated PSA  -Strong family history of prostate cancer    Discussion/Plan  Jessica Ni is a 65 y o  male who presents in consultation today     1  Elevated PSA, strong family history of prostate cancer- no significant findings noted on digital rectal examination today  Provided patient with script for PSA  His last PSA from April 2021 was 4 2  We discussed that given his strong family history of prostate cancer, if PSA remains elevated would recommend proceeding with additional testing  Patient is hesitant about prostate biopsy, but would be amenable to multiparametric MRI of prostate  I will call with his results  Discussed causes for false elevated PSA  He otherwise has no urinary complaints  Call with results of PSA  Will determine follow-up thereafter  He was instructed to call sooner with any issues     -All questions answered, patient agrees with plan      History of Present Illness  65 y o  male who presents in consultation today for evaluation of elevated PSA  Patient referred by his PCP  He wishes to establish care with a urologist   Patient states he had been following with a urologist when he was living in [place] several years ago  He states his father had a history of prostate cancer diagnosed in his early 62s  His father underwent radical prostatectomy and has been doing well  His last documented PSA from 4/20/2021 was 4 2  A prior PSA from 2018 was 3 23  Patient denies any lower urinary tract symptoms, gross hematuria, or dysuria  He denies any prior urologic history, surgical intervention, or instrumentation  Review of Systems  Review of Systems   Constitutional: Negative  HENT: Negative  Respiratory: Negative  Cardiovascular: Negative  Gastrointestinal: Negative  Genitourinary: Negative for decreased urine volume, difficulty urinating, dysuria, flank pain, frequency, hematuria and urgency  Musculoskeletal: Negative  Skin: Negative  Neurological: Negative  Psychiatric/Behavioral: Negative        AUA SYMPTOM SCORE      Most Recent Value   AUA SYMPTOM SCORE    How often have you had a sensation of not emptying your bladder completely after you finished urinating? 0 (P)     How often have you had to urinate again less than two hours after you finished urinating? 0 (P)     How often have you found you stopped and started again several times when you urinate? 0 (P)     How often have you found it difficult to postpone urination? 0 (P)     How often have you had a weak urinary stream? 0 (P)     How often have you had to push or strain to begin urination? 0 (P)     How many times did you most typically get up to urinate from the time you went to bed at night until the time you got up in the morning? 0 (P)     Quality of Life: If you were to spend the rest of your life with your urinary condition just the way it is now, how would you feel about that? 0 (P)     AUA SYMPTOM SCORE 0 (P)           Past Medical History  Past Medical History:   Diagnosis Date    BMI 36 0-36 9,adult 04/27/2021    BMI 37 0-37 9, adult 01/26/2021    Bradycardia     Elevated PSA 04/27/2021    Hypercholesteremia 01/23/2021    Hyperglycemia 01/23/2021    Hyperplastic polyp of sigmoid colon 04/27/2021    Hypertension     Low TSH level 01/23/2021    Other insomnia 04/27/2021    Prediabetes 10/26/2021    Weight gain 01/23/2021       Past Social History  Past Surgical History:   Procedure Laterality Date    COLONOSCOPY  2013    [place], [place]     HEMORROIDECTOMY      [id number]     HERNIA REPAIR Bilateral     In childhood     SHOULDER SURGERY Right     Pain - impingement    WISDOM TOOTH EXTRACTION         Past Family History  Family History   Problem Relation Age of Onset    Hypertension Mother     Other Father         pacemaker     Hypertension Father     Prostate cancer Father         diagnosed in his 62s    Maria Teresa Nan Prostate cancer Family        Past Social history  Social History     Socioeconomic History    Marital status: Single     Spouse name: Not on file    Number of children: Not on file    Years of education: Not on file    Highest education level: Not on file   Occupational History    Not on file   Tobacco Use    Smoking status: Never Smoker    Smokeless tobacco: Never Used   Vaping Use    Vaping Use: Never used   Substance and Sexual Activity    Alcohol use: Yes     Comment: Occasional     Drug use: Never     Comment: No dug use - As per [employer]    Sexual activity: Yes   Other Topics Concern    Not on file   Social History Narrative    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    Lives with his mom        Sees urology in [address] [place] [address]; last seen 2013 in [address]    - As per [address] [place] [address]     Financial Resource Strain: Not on file   Food Insecurity: Not on file   Transportation Needs: Not on file   Physical Activity: Not on file   Stress: Not on file   Social Connections: Not on file   Intimate Partner Violence: Not on file   Housing Stability: Not on file       Current Medications  Current Outpatient Medications   Medication Sig Dispense Refill    amLODIPine (NORVASC) 5 mg tablet Take 1 tablet (5 mg total) by mouth daily 90 tablet 1    Blood Pressure Monitoring (Blood Pressure Kit) KIT Use daily Adult 1 each 0    Cholecalciferol (D3) 50 MCG (2000 UT) TABS       CINNAMON PO Take by mouth daily      Multiple Vitamin (multivitamin) capsule Take 1 capsule by mouth daily      Omega-3 Fatty Acids (Fish Oil) 1000 MG CPDR Take by mouth daily      Cholecalciferol (Vitamin D-3) 125 MCG (5000 UT) TABS Take 2 tablets by mouth every other day       No current facility-administered medications for this visit         Allergies  Allergies   Allergen Reactions    Penicillins Swelling       Past Medical History, Social History, Family History, medications and allergies were reviewed  Vitals  Vitals:    01/24/22 0726   BP: 118/88   Pulse: 67   Weight: 118 kg (260 lb)   Height: 5' 10" (1 778 m)       Physical Exam  Physical Exam  Constitutional:       Appearance: Normal appearance  He is well-developed  HENT:      Head: Normocephalic  Eyes:      Pupils: Pupils are equal, round, and reactive to light  Pulmonary:      Effort: Pulmonary effort is normal    Abdominal:      Palpations: Abdomen is soft  Genitourinary:     Penis: Normal        Testes: Normal       Prostate: Normal       Rectum: Normal       Comments: Prostate nontender, difficult to fully palpate due to body habitus  No distinct nodule noted  Musculoskeletal:         General: Normal range of motion  Cervical back: Normal range of motion  Skin:     General: Skin is warm and dry  Neurological:      General: No focal deficit present  Mental Status: He is alert and oriented to person, place, and time  Psychiatric:         Mood and Affect: Mood normal          Behavior: Behavior normal          Thought Content: Thought content normal          Judgment: Judgment normal          Results    I have personally reviewed all pertinent lab results and reviewed with patient  No results found for: PSA  Lab Results   Component Value Date    CALCIUM 8 9 05/04/2020    K 3 8 05/04/2020    CO2 29 05/04/2020     05/04/2020    BUN 9 05/04/2020    CREATININE 1 11 05/04/2020     Lab Results   Component Value Date    WBC 6 16 05/04/2020    HGB 15 1 05/04/2020    HCT 46 2 05/04/2020    MCV 92 05/04/2020     05/04/2020     No results found for this or any previous visit (from the past 1 hour(s))

## 2022-01-21 ENCOUNTER — TELEPHONE (OUTPATIENT)
Dept: CARDIOLOGY CLINIC | Facility: CLINIC | Age: 57
End: 2022-01-21

## 2022-01-21 NOTE — TELEPHONE ENCOUNTER
Adam Hale sent a [employer] asking for his colonoscopy results in "laymen terms"  He sent it to Dr Ines Barrios, who advised he is not her patient  I called the home # and spoke with his Mom, and I'm forwarding this to GI

## 2022-01-21 NOTE — RESULT ENCOUNTER NOTE
Please call the patient regarding his result  Polyps all hyperplastic and benign    Repeat colonoscopy in 5 years

## 2022-01-23 ENCOUNTER — IMMUNIZATIONS (OUTPATIENT)
Dept: FAMILY MEDICINE CLINIC | Facility: HOSPITAL | Age: 57
End: 2022-01-23

## 2022-01-23 DIAGNOSIS — Z23 ENCOUNTER FOR IMMUNIZATION: Primary | ICD-10-CM

## 2022-01-23 PROCEDURE — 91300 COVID-19 PFIZER VACC 0.3 ML: CPT

## 2022-01-23 PROCEDURE — 0001A COVID-19 PFIZER VACC 0.3 ML: CPT

## 2022-01-24 ENCOUNTER — OFFICE VISIT (OUTPATIENT)
Dept: UROLOGY | Facility: AMBULATORY SURGERY CENTER | Age: 57
End: 2022-01-24
Payer: COMMERCIAL

## 2022-01-24 VITALS
SYSTOLIC BLOOD PRESSURE: 118 MMHG | DIASTOLIC BLOOD PRESSURE: 88 MMHG | HEART RATE: 67 BPM | WEIGHT: 260 LBS | HEIGHT: 70 IN | BODY MASS INDEX: 37.22 KG/M2

## 2022-01-24 DIAGNOSIS — Z12.5 SCREENING FOR PROSTATE CANCER: ICD-10-CM

## 2022-01-24 DIAGNOSIS — R97.20 ELEVATED PSA: Primary | ICD-10-CM

## 2022-01-24 PROCEDURE — 1036F TOBACCO NON-USER: CPT | Performed by: NURSE PRACTITIONER

## 2022-01-24 PROCEDURE — 3008F BODY MASS INDEX DOCD: CPT | Performed by: NURSE PRACTITIONER

## 2022-01-24 PROCEDURE — 99244 OFF/OP CNSLTJ NEW/EST MOD 40: CPT | Performed by: NURSE PRACTITIONER

## 2022-01-26 ENCOUNTER — TELEPHONE (OUTPATIENT)
Dept: UROLOGY | Facility: HOSPITAL | Age: 57
End: 2022-01-26

## 2022-01-26 DIAGNOSIS — R97.20 ELEVATED PSA: Primary | ICD-10-CM

## 2022-02-07 ENCOUNTER — OFFICE VISIT (OUTPATIENT)
Dept: ENDOCRINOLOGY | Facility: CLINIC | Age: 57
End: 2022-02-07
Payer: COMMERCIAL

## 2022-02-07 VITALS
HEART RATE: 71 BPM | WEIGHT: 261 LBS | HEIGHT: 70 IN | BODY MASS INDEX: 37.37 KG/M2 | SYSTOLIC BLOOD PRESSURE: 144 MMHG | DIASTOLIC BLOOD PRESSURE: 86 MMHG | TEMPERATURE: 96.8 F

## 2022-02-07 DIAGNOSIS — E01.0 THYROMEGALY: ICD-10-CM

## 2022-02-07 DIAGNOSIS — E05.90 SUBCLINICAL HYPERTHYROIDISM: Primary | ICD-10-CM

## 2022-02-07 DIAGNOSIS — E04.2 MULTIPLE THYROID NODULES: ICD-10-CM

## 2022-02-07 DIAGNOSIS — E55.9 VITAMIN D DEFICIENCY: ICD-10-CM

## 2022-02-07 PROCEDURE — 99214 OFFICE O/P EST MOD 30 MIN: CPT | Performed by: INTERNAL MEDICINE

## 2022-02-07 PROCEDURE — 3008F BODY MASS INDEX DOCD: CPT | Performed by: INTERNAL MEDICINE

## 2022-02-07 PROCEDURE — 1036F TOBACCO NON-USER: CPT | Performed by: INTERNAL MEDICINE

## 2022-02-07 NOTE — PATIENT INSTRUCTIONS
Please have repeat labs done in 3 months, earlier if you notice any symptoms of hyperthyroidism  Plan for repeat thyroid ultrasound around August of 2022   Follow-up in 6 months

## 2022-02-28 ENCOUNTER — HOSPITAL ENCOUNTER (OUTPATIENT)
Dept: RADIOLOGY | Facility: HOSPITAL | Age: 57
Discharge: HOME | End: 2022-02-28
Payer: COMMERCIAL

## 2022-02-28 DIAGNOSIS — R97.20 ELEVATED PSA: ICD-10-CM

## 2022-02-28 PROCEDURE — 76377 3D RENDER W/INTRP POSTPROCES: CPT

## 2022-02-28 PROCEDURE — 72197 MRI PELVIS W/O & W/DYE: CPT

## 2022-02-28 PROCEDURE — G1004 CDSM NDSC: HCPCS

## 2022-02-28 PROCEDURE — A9585 GADOBUTROL INJECTION: HCPCS | Performed by: NURSE PRACTITIONER

## 2022-02-28 RX ADMIN — GADOBUTROL 12 ML: 604.72 INJECTION INTRAVENOUS at 11:56

## 2022-03-04 ENCOUNTER — TELEPHONE (OUTPATIENT)
Dept: OTHER | Facility: OTHER | Age: 57
End: 2022-03-04

## 2022-03-04 NOTE — TELEPHONE ENCOUNTER
Daryn Mo ([facility]) [phone] (M)     Is returning a call from Amador Coyle RN regarding his MRI

## 2022-03-04 NOTE — TELEPHONE ENCOUNTER
Called and LM for patient to call us back due to lack of communication consent  When patient calls back, his MRI showed PIRADS-2, low risk of prostate cancer and he will need 6 month f/u with PSA ptv 
LM for Pino Dleucapito that his PSa is still elevated @ 4 3 and because of his family h/o of prostate cancer it is recommended that he have multiparametric MRI of prostate as discussed in office  This test is offered at [facility] [address] and at [facility] in [place]  Gave him number [phone]    He is to call if he  has questions
Patient called back  Reviewed MRI results and scheduled 6 month f/u  Patient aware to get PSA ptv  He uses [facility], mail script mailed 
Please inform patient results of recent PSA of 4 3  His PSA remains elevated any has a strong family history of prostate cancer  Would recommend proceeding with multiparametric MRI of prostate as discussed in office  Please assist with scheduling and arrange follow-up in office to discuss results 
Results of multiparametric MRI of prostate revealed PIRADS-2, low risk of prostate cancer  Recommend follow up in 6 months with PSA 
- - -

## 2022-03-15 ENCOUNTER — OFFICE VISIT (OUTPATIENT)
Dept: CARDIOLOGY CLINIC | Facility: CLINIC | Age: 57
End: 2022-03-15
Payer: COMMERCIAL

## 2022-03-15 VITALS
OXYGEN SATURATION: 97 % | WEIGHT: 259.5 LBS | SYSTOLIC BLOOD PRESSURE: 138 MMHG | HEIGHT: 70 IN | HEART RATE: 66 BPM | DIASTOLIC BLOOD PRESSURE: 82 MMHG | BODY MASS INDEX: 37.15 KG/M2

## 2022-03-15 DIAGNOSIS — R00.1 BRADYCARDIA: Primary | ICD-10-CM

## 2022-03-15 DIAGNOSIS — E78.00 HYPERCHOLESTEREMIA: ICD-10-CM

## 2022-03-15 DIAGNOSIS — I10 ESSENTIAL HYPERTENSION: ICD-10-CM

## 2022-03-15 PROCEDURE — 99213 OFFICE O/P EST LOW 20 MIN: CPT | Performed by: INTERNAL MEDICINE

## 2022-03-15 PROCEDURE — 1036F TOBACCO NON-USER: CPT | Performed by: INTERNAL MEDICINE

## 2022-03-15 PROCEDURE — 3008F BODY MASS INDEX DOCD: CPT | Performed by: INTERNAL MEDICINE

## 2022-03-15 NOTE — PROGRESS NOTES
Cardiology Follow Up    Cori Ott  1965  [id number]  [facility]  [address] [address] [phone]    1  Bradycardia     2  Hypercholesteremia     3  Essential hypertension         Interval History:  Cardiology follow-up  Patient continues to do well, he is asymptomatic from a cardiac point of view, denies any chest pain or dyspnea, is active but not exercising regularly, sleep hygiene appears to be adequate  Compliant low-sodium diet and ambulatory blood pressure in the 130/80 range patient  Today's is 138/82  He did not do his lipid profile last year, his thyroid function test was borderline abnormal with low TSH but normal T3 and T4      Patient Active Problem List   Diagnosis    Bradycardia    Weight gain    Hypercholesteremia    Low TSH level    BMI 37 0-37 9, adult    Hyperplastic polyp of sigmoid colon    BMI 36 0-36 9,adult    Elevated PSA    Other insomnia    Thyromegaly    Subclinical hyperthyroidism    Vitamin D deficiency    Prediabetes    Essential hypertension    Multiple thyroid nodules     Past Medical History:   Diagnosis Date    BMI 36 0-36 9,adult 04/27/2021    BMI 37 0-37 9, adult 01/26/2021    Bradycardia     Elevated PSA 04/27/2021    Hypercholesteremia 01/23/2021    Hyperglycemia 01/23/2021    Hyperplastic polyp of sigmoid colon 04/27/2021    Hypertension     Low TSH level 01/23/2021    Other insomnia 04/27/2021    Prediabetes 10/26/2021    Weight gain 01/23/2021     Social History     Socioeconomic History    Marital status: Single     Spouse name: Not on file    Number of children: Not on file    Years of education: Not on file    Highest education level: Not on file   Occupational History    Not on file   Tobacco Use    Smoking status: Never Smoker    Smokeless tobacco: Never Used   Vaping Use    Vaping Use: Never used   Substance and Sexual Activity    Alcohol use: Yes     Comment: Occasional     Drug use: Never     Comment: No dug use - As per [employer]    Sexual activity: Yes   Other Topics Concern    Not on file   Social History Narrative    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    Lives with his mom        Sees urology in [address] [place] [address]; last seen 2013 in [place], [address] [place] [address]    - As per [facility]     Social Determinants of Health     Financial Resource Strain: Not on file   Food Insecurity: Not on file   Transportation Needs: Not on file   Physical Activity: Not on file   Stress: Not on file   Social Connections: Not on file   Intimate Partner Violence: Not on file   Housing Stability: Not on file      Family History   Problem Relation Age of Onset    Hypertension Mother     Other Father         pacemaker     Hypertension Father     Prostate cancer Father         diagnosed in his 62s     Prostate cancer Family      Past Surgical History:   Procedure Laterality Date    COLONOSCOPY  2013    [place], [address] [place] [address]     HEMORROIDECTOMY      [id number]     HERNIA REPAIR Bilateral     In childhood     SHOULDER SURGERY Right     Pain - impingement    WISDOM TOOTH EXTRACTION         Current Outpatient Medications:     amLODIPine (NORVASC) 5 mg tablet, Take 1 tablet (5 mg total) by mouth daily, Disp: 90 tablet, Rfl: 1    Blood Pressure Monitoring (Blood Pressure Kit) KIT, Use daily Adult, Disp: 1 each, Rfl: 0    Cholecalciferol (Vitamin D-3) 125 MCG (5000 UT) TABS, Take 2,000 tablets by mouth daily  , Disp: , Rfl:     CINNAMON PO, Take by mouth daily, Disp: , Rfl:     Multiple Vitamin (multivitamin) capsule, Take 1 capsule by mouth daily, Disp: , Rfl:     Omega-3 Fatty Acids (Fish Oil) 1000 MG CPDR, Take by mouth daily, Disp: , Rfl:     Cholecalciferol (D3) 50 MCG (2000 UT) TABS, , Disp: , Rfl:   Allergies   Allergen Reactions    Penicillins Swelling       Labs:  Hospital Outpatient Visit on 01/17/2022   Component Date Value    Case Report 01/17/2022                      Value:Surgical Pathology Report                         Case: [id number]                                   Authorizing Provider:  Charly Newby MD         Collected:           01/17/2022 4616              Ordering Location:     [address] [place] [address] Received:            01/17/2022 2142                                     [facility]                                                                  Pathologist:           Moriah Charles MD                                                        Specimen:    Large Intestine, Sigmoid Colon, cold bx and cold snare, mid sigmoid polyp x4               Final Diagnosis 01/17/2022                      Value: This result contains rich text formatting which cannot be displayed here   Additional Information 01/17/2022                      Value: This result contains rich text formatting which cannot be displayed here  Ardeth Needs Gross Description 01/17/2022                      Value: This result contains rich text formatting which cannot be displayed here  Orders Only on 10/18/2021   Component Date Value    Hemoglobin A1C 10/18/2021 6 1      Imaging: MRI prostate multiparametric wo w contrast    Result Date: 3/3/2022  Narrative: MULTIPARAMETRIC MRI OF THE PROSTATE WITH AND WITHOUT CONTRAST-WITH 3-D POSTPROCESSING  INDICATION:   R97 20: Elevated prostate specific antigen (PSA)  COMPARISON: None PSA LEVEL: 4 2 ng/ml  April 2021 per urology note 1/24/2022  No recent PSA available at the time of review  PRIOR BIOPSY DATE: No prior biopsy  BIOPSY RESULTS: Not applicable  TECHNIQUE: The following pulse sequences were obtained:  Small field-of-view axial T1-weighted and multiplanar T2-weighted images; DWI axial and ADC map; large field of view axial T2 weighted images; T1w in-phase and opposed-phase axials of entire pelvis  and dynamic 3D T1w axial before and during IV contrast injection   Imaging performed on 1 5T MRI CONTRAST:  Gadobutrol (Gadavist) 12 mL of Gadobutrol injection (SINGLE-DOSE) TECHNICAL LIMITATIONS: None  FINDINGS: PROSTATE:   Size: 5 5 x 5 4 x 4 7 cm = 65 8 cc  Post-biopsy hemorrhage:  None  Central gland enlargement (BPH): Moderate  Focal lesions - No dominant lesion  Heterogeneous peripheral zone  No focal marked restriction  PI-RADSv2 1 Category 2 - Low (clinically significant cancer unlikely)  SEMINAL VESICLES: Unremarkable Note: Clinically significant cancer is defined on pathology/histology as Frankford score greater than or equal to 7, and/or volume of greater than or equal to 0 5 mL, and/or extraprostatic extension  URINARY BLADDER: Unremarkable LYMPH NODES: No pelvic lymphadenopathy  BONES: No suspicious osseous lesion  Impression: 1  PI-RADSv2 1 Category 2 - Low (clinically significant cancer is unlikely to be present)  2  No extraprostatic tumor, seminal vesicle invasion, pelvic lymphadenopathy, or pelvic osseous metastatic disease  3  Calculated prostate volume of 65 1 cc  Prostate gland boundaries were segmented using 3D advanced post-processing on an independent [employer] system workstation with active physician participation  Workstation performed: [id number]       Review of Systems:  Review of Systems   Constitutional: Negative for fatigue and unexpected weight change  Respiratory: Negative for apnea, shortness of breath, wheezing and stridor  Cardiovascular: Negative for chest pain, palpitations and leg swelling  Neurological: Negative for syncope  Psychiatric/Behavioral: Negative for sleep disturbance  Physical Exam:  Physical Exam  Vitals reviewed  Constitutional:       General: He is not in acute distress  Appearance: Normal appearance  He is obese  He is not ill-appearing, toxic-appearing or diaphoretic  Neck:      Vascular: No carotid bruit  Cardiovascular:      Rate and Rhythm: Normal rate and regular rhythm  Pulses: Normal pulses        Heart sounds: Normal heart sounds  No murmur heard  No friction rub  No gallop  Pulmonary:      Effort: Pulmonary effort is normal  No respiratory distress  Breath sounds: Normal breath sounds  No stridor  No wheezing, rhonchi or rales  Musculoskeletal:      Right lower leg: No edema  Left lower leg: No edema  Skin:     General: Skin is warm and dry  Capillary Refill: Capillary refill takes less than 2 seconds  Neurological:      Mental Status: He is alert  Psychiatric:         Mood and Affect: Mood normal          Discussion/Summary:  Hypertension, essential, well controlled current medical regimen, he has been hypertensive for over 2 decades  Previously on beta-blocker this was discontinue because of resting bradycardia, Holter monitor last year revealed resting sinus bradycardia average of 59, ranging from , he had no symptoms or arrhythmias bradyarrhythmias or tachyarrhythmias  Stress test he did a minutes of Sanjay protocol achieving target heart rate, there was no EKG criteria ischemia or symptoms or arrhythmias  Present time favor no interventions  Regular exercise and weight management recommended as well as continue ambulatory blood pressure readings    This note was completed in part utilizing [employer] direct voice recognition software  Grammatical errors, random word insertion, spelling mistakes, and incomplete sentences may be an occasional consequence of the system secondary to software limitations, ambient noise and hardware issues  At the time of dictation, efforts were made to edit, clarify and /or correct errors  Please read the chart carefully and recognize, using context, where substitutions have occurred  If you have any questions or concerns about the context, text or information contained within the body of this dictation, please contact myself, the provider, for further clarification

## 2022-04-21 DIAGNOSIS — I10 ESSENTIAL HYPERTENSION: ICD-10-CM

## 2022-04-21 RX ORDER — AMLODIPINE BESYLATE 5 MG/1
TABLET ORAL
Qty: 90 TABLET | Refills: 1 | Status: SHIPPED | OUTPATIENT
Start: 2022-04-21

## 2022-04-26 ENCOUNTER — OFFICE VISIT (OUTPATIENT)
Dept: INTERNAL MEDICINE CLINIC | Facility: CLINIC | Age: 57
End: 2022-04-26
Payer: COMMERCIAL

## 2022-04-26 VITALS
RESPIRATION RATE: 12 BRPM | DIASTOLIC BLOOD PRESSURE: 80 MMHG | HEIGHT: 70 IN | WEIGHT: 253 LBS | OXYGEN SATURATION: 97 % | BODY MASS INDEX: 36.22 KG/M2 | TEMPERATURE: 98.7 F | HEART RATE: 62 BPM | SYSTOLIC BLOOD PRESSURE: 124 MMHG

## 2022-04-26 DIAGNOSIS — R21 SKIN RASH: ICD-10-CM

## 2022-04-26 DIAGNOSIS — E78.00 HYPERCHOLESTEREMIA: ICD-10-CM

## 2022-04-26 DIAGNOSIS — E05.90 SUBCLINICAL HYPERTHYROIDISM: ICD-10-CM

## 2022-04-26 DIAGNOSIS — E55.9 VITAMIN D DEFICIENCY: ICD-10-CM

## 2022-04-26 DIAGNOSIS — I10 ESSENTIAL HYPERTENSION: Primary | ICD-10-CM

## 2022-04-26 DIAGNOSIS — E04.2 MULTIPLE THYROID NODULES: ICD-10-CM

## 2022-04-26 DIAGNOSIS — R73.03 PREDIABETES: ICD-10-CM

## 2022-04-26 DIAGNOSIS — R97.20 ELEVATED PSA: ICD-10-CM

## 2022-04-26 DIAGNOSIS — K63.5 HYPERPLASTIC POLYP OF SIGMOID COLON: ICD-10-CM

## 2022-04-26 PROCEDURE — 99214 OFFICE O/P EST MOD 30 MIN: CPT | Performed by: INTERNAL MEDICINE

## 2022-04-26 PROCEDURE — 1036F TOBACCO NON-USER: CPT | Performed by: INTERNAL MEDICINE

## 2022-04-26 PROCEDURE — 3008F BODY MASS INDEX DOCD: CPT | Performed by: INTERNAL MEDICINE

## 2022-04-26 RX ORDER — CLOBETASOL PROPIONATE 0.5 MG/G
CREAM TOPICAL 2 TIMES DAILY
Qty: 45 G | Refills: 1 | Status: SHIPPED | OUTPATIENT
Start: 2022-04-26

## 2022-04-26 NOTE — PATIENT INSTRUCTIONS
Patient was advised to continue present medications  discussed with the patient medications and laboratory data in detail  Follow-up with me in 4 months or as advised  If any blood test was ordered please do 1 week prior to next appointment unless advise to get earlier    If you have any questions please call the office [phone]

## 2022-04-26 NOTE — ASSESSMENT & PLAN NOTE
His cholesterol has been decreasing from [phone]  Still uncontrolled  LDL has been decreasing from [phone]  Triglyceride 114  Patient does not want any medication for hyperlipidemia  Advised for low-cholesterol low saturated diet

## 2022-04-26 NOTE — PROGRESS NOTES
Assessment/Plan:    1  Essential hypertension  Assessment & Plan:  Blood pressure well controlled  Advised to continue present medication  Advised for low-salt diet  2  Hypercholesteremia  Assessment & Plan:  His cholesterol has been decreasing from 263-240--235  Still uncontrolled  LDL has been decreasing from 187-180 -169  Triglyceride 114  Patient does not want any medication for hyperlipidemia  Advised for low-cholesterol low saturated diet  3  Subclinical hyperthyroidism  Assessment & Plan:  Patient was seen by endocrinologist   Has a follow-up appointment with the blood test       4  Multiple thyroid nodules  Assessment & Plan:  Has been seen by endocrinologist   Had a ultrasound of the thyroid August 2021  Will need follow-up ultrasound August 2022       5  Elevated PSA  Assessment & Plan:  He was seen by urologist   Had MRI of prostate which was unremarkable  Advised to follow with the urologist       6  Vitamin D deficiency  Assessment & Plan:  Patient has been taking vitamin-D supplement 2000 International Units daily advised to continue  7  BMI 36 0-36 9,adult  Assessment & Plan:  Patient  was advised to decrease portion size  Advised to decrease carb, sugar, cholesterol intake  Advised to exercise 3-5 times per week  Advised to lose weight  8  Prediabetes  Assessment & Plan:  His hemoglobin A1c decreased from 6 1-5 9  Advised to continue to watch diet for sugar and carbs intake  9  Skin rash  Assessment & Plan:  He has a dry scaly , slightly due skin rash midsternal area  Possible eczema  Will start to clobetasol cream if not better patient to call me then will refer to dermatologist    Orders:  -     clobetasol (TEMOVATE) 0 05 % cream; Apply topically 2 (two) times a day    10  Hyperplastic polyp of sigmoid colon  Assessment & Plan:  Had a colonoscopy January 2022 with revealed colon polyps was advised follow-up in 5 years  BMI Counseling:  Body mass index is 36 3 kg/m²  The BMI is above normal  Nutrition recommendations include decreasing portion sizes, decreasing fast food intake, consuming healthier snacks, limiting drinks that contain sugar, moderation in carbohydrate intake, reducing intake of saturated and trans fat and reducing intake of cholesterol  Exercise recommendations include exercising 3-5 times per week  No pharmacotherapy was ordered  Rationale for BMI follow-up plan is due to patient being overweight or obese  Subjective:  Patient presents for follow-up  Patient ID: Natalie Michael is a 65 y o  male  HPI   41-year-old male patient presents for follow-up of his medical problems  He denies any chest pain, shortness of breath, pain in abdomen  Denies any cough, fever, chills  Denies any nausea vomiting, diarrhea  He got his COVID-19 vaccination including booster dose  He was seen by urologist as well as endocrinologist     The following portions of the patient's history were reviewed and updated as appropriate:     Past Medical History:  He has a past medical history of BMI 36 0-36 9,adult (04/27/2021), BMI 37 0-37 9, adult (01/26/2021), Bradycardia, Elevated PSA (04/27/2021), Hypercholesteremia (01/23/2021), Hyperglycemia (01/23/2021), Hyperplastic polyp of sigmoid colon (04/27/2021), Hypertension, Low TSH level (01/23/2021), Other insomnia (04/27/2021), Prediabetes (10/26/2021), Skin rash (4/26/2022), and Weight gain (01/23/2021)  ,  _______________________________________________________________________  Past Surgical History:   has a past surgical history that includes Hernia repair (Bilateral); Hemorroidectomy; Shoulder surgery (Right); Colonoscopy (2013); and [place] tooth extraction  ,  _______________________________________________________________________  Family History:  family history includes Hypertension in his father and mother;  Other in his father; Prostate cancer in his family and father ,  _______________________________________________________________________  Social History:   reports that he has never smoked  He has never used smokeless tobacco  He reports current alcohol use  He reports that he does not use drugs  ,  _______________________________________________________________________  Allergies:  is allergic to penicillins     _______________________________________________________________________  Current Outpatient Medications   Medication Sig Dispense Refill    amLODIPine (NORVASC) 5 mg tablet TAKE 1 TABLET BY MOUTH EVERY DAY 90 tablet 1    Blood Pressure Monitoring (Blood Pressure Kit) KIT Use daily Adult 1 each 0    Cholecalciferol (D3) 50 MCG (2000 UT) TABS       CINNAMON PO Take by mouth daily      Multiple Vitamin (multivitamin) capsule Take 1 capsule by mouth daily      Omega-3 Fatty Acids (Fish Oil) 1000 MG CPDR Take by mouth daily      Cholecalciferol (Vitamin D-3) 125 MCG (5000 UT) TABS Take 2,000 tablets by mouth daily        clobetasol (TEMOVATE) 0 05 % cream Apply topically 2 (two) times a day 45 g 1     No current facility-administered medications for this visit      _______________________________________________________________________  Review of Systems   Constitutional: Negative for chills, fatigue and fever  HENT: Negative for congestion, ear pain, hearing loss, nosebleeds, sinus pain, sore throat and trouble swallowing  Eyes: Negative for discharge, redness and visual disturbance  Respiratory: Negative for cough, chest tightness and shortness of breath  Cardiovascular: Negative for chest pain and palpitations  Gastrointestinal: Negative for abdominal pain, blood in stool, constipation, diarrhea, nausea and vomiting  Genitourinary: Negative for dysuria, flank pain, frequency and hematuria  Musculoskeletal: Negative for arthralgias, myalgias and neck pain  Skin: Positive for rash (Midsternal area)  Negative for color change     Neurological: Negative for dizziness, speech difficulty, weakness and headaches  Hematological: Does not bruise/bleed easily  Psychiatric/Behavioral: Negative for agitation and behavioral problems  Objective:  Vitals:    04/26/22 0852   BP: 124/80   BP Location: Right arm   Patient Position: Sitting   Cuff Size: Adult   Pulse: 62   Resp: 12   Temp: 98 7 °F (37 1 °C)   TempSrc: Tympanic   SpO2: 97%   Weight: 115 kg (253 lb)   Height: 5' 10" (1 778 m)     Body mass index is 36 3 kg/m²  Physical Exam  Vitals and nursing note reviewed  Constitutional:       General: He is not in acute distress  Appearance: Normal appearance  HENT:      Head: Normocephalic and atraumatic  Right Ear: Ear canal and external ear normal       Left Ear: Ear canal and external ear normal       Nose: Nose normal       Mouth/Throat:      Mouth: Mucous membranes are moist    Eyes:      General: No scleral icterus  Right eye: No discharge  Left eye: No discharge  Extraocular Movements: Extraocular movements intact  Conjunctiva/sclera: Conjunctivae normal    Cardiovascular:      Rate and Rhythm: Normal rate and regular rhythm  Pulses: Normal pulses  Heart sounds: No murmur heard  Pulmonary:      Effort: Pulmonary effort is normal  No respiratory distress  Breath sounds: Normal breath sounds  Abdominal:      General: Bowel sounds are normal       Palpations: Abdomen is soft  Tenderness: There is no abdominal tenderness  Musculoskeletal:         General: Normal range of motion  Cervical back: Normal range of motion and neck supple  No muscular tenderness  Right lower leg: No edema  Left lower leg: No edema  Skin:     General: Skin is warm  Findings: Rash ( scaly slightly dark skin rash midsternal area  No vesicles no discharge ) present  Neurological:      General: No focal deficit present        Mental Status: He is alert and oriented to person, place, and time       Motor: No weakness  Coordination: Coordination normal    Psychiatric:         Mood and Affect: Mood normal          Behavior: Behavior normal          I spent 30 minutes with the patient today    More than 50% time spent for reviewing of external notes, reviewing of the results of diagnostics test, management of care, patient education and ordering of test

## 2022-04-26 NOTE — ASSESSMENT & PLAN NOTE
He was seen by urologist   Had MRI of prostate which was unremarkable    Advised to follow with the urologist

## 2022-04-26 NOTE — ASSESSMENT & PLAN NOTE
He has a dry scaly , slightly due skin rash midsternal area  Possible eczema    Will start to clobetasol cream if not better patient to call me then will refer to dermatologist

## 2022-04-26 NOTE — ASSESSMENT & PLAN NOTE
His hemoglobin A1c decreased from 6 1-5 9  Advised to continue to watch diet for sugar and carbs intake

## 2022-04-26 NOTE — ASSESSMENT & PLAN NOTE
Has been seen by endocrinologist   Had a ultrasound of the thyroid August 2021  Will need follow-up ultrasound August 2022

## 2022-05-11 ENCOUNTER — TELEPHONE (OUTPATIENT)
Dept: ENDOCRINOLOGY | Facility: CLINIC | Age: 57
End: 2022-05-11

## 2022-05-11 NOTE — TELEPHONE ENCOUNTER
----- Message from Zackary Klein MD sent at 5/10/2022  4:12 PM EDT -----  TSH continues to be low, free T4, T3 within normal limits If patient is not having any hyperthyroid symptoms, okay to continue monitoring, repeat TSH, free T4, T3 in 4-6 months, earlier if he develops any hyperthyroid symptoms

## 2022-05-11 NOTE — TELEPHONE ENCOUNTER
Left detailed message with lab results and to call the office with questions  Mailed lab slips to be done in 6-8 weeks

## 2022-08-08 ENCOUNTER — OFFICE VISIT (OUTPATIENT)
Dept: ENDOCRINOLOGY | Facility: CLINIC | Age: 57
End: 2022-08-08
Payer: COMMERCIAL

## 2022-08-08 VITALS
HEART RATE: 62 BPM | WEIGHT: 254 LBS | DIASTOLIC BLOOD PRESSURE: 90 MMHG | SYSTOLIC BLOOD PRESSURE: 130 MMHG | BODY MASS INDEX: 36.36 KG/M2 | HEIGHT: 70 IN

## 2022-08-08 DIAGNOSIS — E01.0 THYROMEGALY: ICD-10-CM

## 2022-08-08 DIAGNOSIS — E05.90 SUBCLINICAL HYPERTHYROIDISM: Primary | ICD-10-CM

## 2022-08-08 DIAGNOSIS — E04.2 MULTIPLE THYROID NODULES: ICD-10-CM

## 2022-08-08 PROCEDURE — 99214 OFFICE O/P EST MOD 30 MIN: CPT | Performed by: INTERNAL MEDICINE

## 2022-08-08 NOTE — PATIENT INSTRUCTIONS
Ultrasound thyroid to assess interval change in thyroid nodules- ordered, please have it scheduled   Repeat thyroid function tests in 6 months, earlier if you notice any symptoms of hypo or hyperthyroidism   Follow-up in 6 months

## 2022-08-08 NOTE — PROGRESS NOTES
Evi Solis 65 y o  male MRN: [id number]    Encounter: [id number]      Assessment/Plan     1  Multiple Thyroid nodules   2  Subclinical hyperthyroidism   3  Thyromegaly  Clinically euthyroid, asymptomatic   -will continue to monitor thyroid function tests every 6 months, earlier if patient has any symptoms of hypo or hyperthyroidism   Repeat ultrasound to assess interval change in thyroid nodules - reminded patient to have it scheduled     Follow-up in 6 months    CC:  Thyroid nodules    History of Present Illness     HPI:  Evi Solis is a 65 y o  male who is here for a follow-up of subclinical hyperthyroidism, multiple nodules    Last seen in 02/2022  Per my prior notes   "Initially noted to have suppressed TSH in 12/2020, had insomnia but no other symptoms of hyperthyroidism   In follow-up, symptom improved however continue to have subclinical hyperthyroidism on labs, was decided to monitor  TSI, TPO, Tg Antibody negative"   At the last visit, based on discussion patient had decided to monitor conservatively, did not want to have thyroid uptake scan or start any medications  Feels well, does not have any complaints   No symptoms of hyper hyperthyroidism   At times does not sleep well, feels it may be related to his work schedule in timing    History of thyroid nodules-last ultrasound 08/30/2021   Denies swelling in the neck, no obstructive symptoms       All other systems were reviewed and are negative         Review of Systems    Historical Information   Past Medical History:   Diagnosis Date    BMI 36 0-36 9,adult 04/27/2021    BMI 37 0-37 9, adult 01/26/2021    Bradycardia     Elevated PSA 04/27/2021    Hypercholesteremia 01/23/2021    Hyperglycemia 01/23/2021    Hyperplastic polyp of sigmoid colon 04/27/2021    Hypertension     Low TSH level 01/23/2021    Other insomnia 04/27/2021    Prediabetes 10/26/2021    Skin rash 4/26/2022    Weight gain 01/23/2021     Past Surgical History: Procedure Laterality Date    COLONOSCOPY  2013    [place], [place]     HEMORROIDECTOMY      [id number]     HERNIA REPAIR Bilateral     In childhood     SHOULDER SURGERY Right     Pain - impingement    WISDOM TOOTH EXTRACTION       Social History   Social History     Substance and Sexual Activity   Alcohol Use Yes    Comment: Occasional      Social History     Substance and Sexual Activity   Drug Use Never    Comment: No dug use - As per [employer]     Social History     Tobacco Use   Smoking Status Never Smoker   Smokeless Tobacco Never Used     Family History:   Family History   Problem Relation Age of Onset    Hypertension Mother     Other Father         pacemaker     Hypertension Father     Prostate cancer Father         diagnosed in his 62s     Prostate cancer Family        Meds/Allergies   Current Outpatient Medications   Medication Sig Dispense Refill    amLODIPine (NORVASC) 5 mg tablet TAKE 1 TABLET BY MOUTH EVERY DAY 90 tablet 1    Blood Pressure Monitoring (Blood Pressure Kit) KIT Use daily Adult 1 each 0    CINNAMON PO Take by mouth daily      clobetasol (TEMOVATE) 0 05 % cream Apply topically 2 (two) times a day 45 g 1    Multiple Vitamin (multivitamin) capsule Take 1 capsule by mouth daily      Omega-3 Fatty Acids (Fish Oil) 1000 MG CPDR Take by mouth daily      Cholecalciferol (D3) 50 MCG (2000 UT) TABS  (Patient not taking: Reported on 8/8/2022)      Cholecalciferol (Vitamin D-3) 125 MCG (5000 UT) TABS Take 2,000 tablets by mouth daily         No current facility-administered medications for this visit  Allergies   Allergen Reactions    Penicillins Swelling       Objective   Vitals: Blood pressure 130/90, pulse 62, height 5' 10" (1 778 m), weight 115 kg (254 lb)  Physical Exam  Constitutional:       General: He is not in acute distress  Appearance: He is well-developed  He is not diaphoretic  HENT:      Head: Normocephalic and atraumatic     Eyes:      Conjunctiva/sclera: Conjunctivae normal       Pupils: Pupils are equal, round, and reactive to light  Neck:      Comments: Thyromegaly right >left, nodular goiter, nontender not fixed to the overlying skin or surrounding structures  Cardiovascular:      Rate and Rhythm: Normal rate and regular rhythm  Heart sounds: Normal heart sounds  No murmur heard  Pulmonary:      Effort: Pulmonary effort is normal  No respiratory distress  Breath sounds: Normal breath sounds  No wheezing  Abdominal:      General: There is no distension  Palpations: Abdomen is soft  Tenderness: There is no abdominal tenderness  There is no guarding  Musculoskeletal:      Cervical back: Normal range of motion and neck supple  Skin:     General: Skin is warm and dry  Findings: No erythema or rash  Neurological:      Mental Status: He is alert and oriented to person, place, and time  Deep Tendon Reflexes: Reflexes normal       Comments: No tremors on the outstretched arms     Psychiatric:         Behavior: Behavior normal          Thought Content: Thought content normal          The history was obtained from the review of the chart, patient  Lab Results:      Lab Results   Component Value Date    WBC 6 16 05/04/2020    HGB 15 1 05/04/2020    HCT 46 2 05/04/2020    MCV 92 05/04/2020     05/04/2020     Lab Results   Component Value Date    CREATININE 1 11 05/04/2020    BUN 9 05/04/2020    K 3 8 05/04/2020     05/04/2020    CO2 29 05/04/2020     Lab Results   Component Value Date    HGBA1C 5 9 04/12/2022         Imaging Studies:   Results for orders placed during the hospital encounter of 08/30/21    US thyroid    Impression  No nodule meets current ACR criteria for requiring biopsy but followup ultrasound is recommended in 1 year  Enlarged gland which is mildly hyperemic  Reference: ACR Thyroid Imaging, Reporting and Data System (TI-RADS): White Paper of the [facility]   J AM Ramiro Radiol [id number]  (additional recommendations based on American Thyroid Association 2015 guidelines )      Workstation performed: [id number]      I have personally reviewed pertinent reports  Portions of the record may have been created with voice recognition software  Occasional wrong word or "sound a like" substitutions may have occurred due to the inherent limitations of voice recognition software  Read the chart carefully and recognize, using context, where substitutions have occurred

## 2022-08-23 LAB — HBA1C MFR BLD HPLC: 5.9 %

## 2022-08-29 ENCOUNTER — OFFICE VISIT (OUTPATIENT)
Dept: INTERNAL MEDICINE CLINIC | Facility: CLINIC | Age: 57
End: 2022-08-29
Payer: COMMERCIAL

## 2022-08-29 VITALS
WEIGHT: 253 LBS | RESPIRATION RATE: 14 BRPM | HEIGHT: 70 IN | SYSTOLIC BLOOD PRESSURE: 124 MMHG | OXYGEN SATURATION: 97 % | DIASTOLIC BLOOD PRESSURE: 80 MMHG | BODY MASS INDEX: 36.22 KG/M2 | TEMPERATURE: 98.7 F | HEART RATE: 62 BPM

## 2022-08-29 DIAGNOSIS — R00.1 BRADYCARDIA: ICD-10-CM

## 2022-08-29 DIAGNOSIS — E05.90 SUBCLINICAL HYPERTHYROIDISM: ICD-10-CM

## 2022-08-29 DIAGNOSIS — I10 ESSENTIAL HYPERTENSION: Primary | ICD-10-CM

## 2022-08-29 DIAGNOSIS — E55.9 VITAMIN D DEFICIENCY: ICD-10-CM

## 2022-08-29 DIAGNOSIS — R97.20 ELEVATED PSA: ICD-10-CM

## 2022-08-29 DIAGNOSIS — R73.03 PREDIABETES: ICD-10-CM

## 2022-08-29 DIAGNOSIS — E04.2 MULTIPLE THYROID NODULES: ICD-10-CM

## 2022-08-29 DIAGNOSIS — E78.00 HYPERCHOLESTEREMIA: ICD-10-CM

## 2022-08-29 PROCEDURE — 99214 OFFICE O/P EST MOD 30 MIN: CPT | Performed by: INTERNAL MEDICINE

## 2022-08-29 NOTE — ASSESSMENT & PLAN NOTE
He was seen by cardiologist   Had a cardiac workup  Heart rate 62 today  Asymptomatic  Patient states his father also has a lower heart rate

## 2022-08-29 NOTE — ASSESSMENT & PLAN NOTE
His fasting blood sugar decreased from 129-125  Hemoglobin A1c remains 5 9  Advised to watch diet for sugar carbs intake  Advised to exercise lose weight  Will refer to Nutrition therapy

## 2022-08-29 NOTE — PATIENT INSTRUCTIONS
Patient was advised to continue present medications  discussed with the patient medications and laboratory data in detail  Follow-up with me in  6 months or as advised  If any blood test was ordered please do 1 week prior to next appointment unless advise to get earlier    If you have any questions please call the office [phone]

## 2022-08-29 NOTE — ASSESSMENT & PLAN NOTE
Uncontrolled  Cholesterol increased from 235-257 LDL increased from 169-183  HDL 56 and triglyceride 92  Patient advised to start medication but he does not want  medication  Patient state he has lot of cheese  Advised for low-cholesterol low saturated diet  Will refer to Nutrition therapy

## 2022-08-29 NOTE — ASSESSMENT & PLAN NOTE
Patient is a not taking vitamin-D supplement advised take vitamin-D 2000 International Units daily  Will check vitamin-D level next time

## 2022-08-29 NOTE — ASSESSMENT & PLAN NOTE
He has been followed by endocrinologist   Will be going ultrasound of the thyroid gland    His blood test for thyroid is followed by endocrinologist

## 2022-08-29 NOTE — PROGRESS NOTES
Assessment/Plan:    1  Essential hypertension  Assessment & Plan:  Blood pressure well controlled  Advised to continue present medication  Advised for low-salt diet  Orders:  -     Ambulatory Referral to Nutrition Services; Future  -     CBC and differential; Future  -     Comprehensive metabolic panel; Future  -     UA (URINE) with reflex to Scope    2  Hypercholesteremia  Assessment & Plan:  Uncontrolled  Cholesterol increased from 235-257 LDL increased from 169-183  HDL 56 and triglyceride 92  Patient advised to start medication but he does not want  medication  Patient state he has lot of cheese  Advised for low-cholesterol low saturated diet  Will refer to Nutrition therapy  Orders:  -     Ambulatory Referral to Nutrition Services; Future  -     Lipid panel; Future    3  Multiple thyroid nodules  Assessment & Plan:  He has been seen by endocrinologist recently  Will be going for ultrasound of the thyroid gland  4  Subclinical hyperthyroidism  Assessment & Plan:  He has been followed by endocrinologist   Will be going ultrasound of the thyroid gland  His blood test for thyroid is followed by endocrinologist       5  Elevated PSA  Assessment & Plan:  He was seen by urologist   Had MRI of prostate  Has a follow-up appointment with Urology per patient  6  Vitamin D deficiency  Assessment & Plan:  Patient is a not taking vitamin-D supplement advised take vitamin-D 2000 International Units daily  Will check vitamin-D level next time  Orders:  -     Vitamin D 25 hydroxy; Future    7  Prediabetes  Assessment & Plan:  His fasting blood sugar decreased from 129-125  Hemoglobin A1c remains 5 9  Advised to watch diet for sugar carbs intake  Advised to exercise lose weight  Will refer to Nutrition therapy  Orders:  -     Ambulatory Referral to Nutrition Services; Future  -     Comprehensive metabolic panel;  Future  -     Hemoglobin A1C; Future  -     UA (URINE) with reflex to Scope    8  BMI 36 0-36 9,adult  Assessment & Plan:  Patient  was advised to decrease portion size  Advised to decrease carb, sugar, cholesterol intake  Advised to exercise 3-5 times per week  Advised to lose weight  Orders:  -     Ambulatory Referral to Nutrition Services; Future    9  Bradycardia  Assessment & Plan:  He was seen by cardiologist   Had a cardiac workup  Heart rate 62 today  Asymptomatic  Patient states his father also has a lower heart rate  Subjective:  Patient presents for follow-up      Patient ID: Juan Diez is a 65 y o  male  HPI   45-year-old male patient presents for follow-up of his medical problems  He denies any chest pain, shortness a of breath, pain in abdomen denies any cough, fever, chills  Denies any nausea vomiting, diarrhea  He was seen by his endocrinologist recently  He was seen by urologist   Had a MRI of the prostate  Will be going for Holter the thyroid gland  The following portions of the patient's history were reviewed and updated as appropriate:     Past Medical History:  He has a past medical history of BMI 36 0-36 9,adult (04/27/2021), BMI 37 0-37 9, adult (01/26/2021), Bradycardia, Elevated PSA (04/27/2021), Hypercholesteremia (01/23/2021), Hyperglycemia (01/23/2021), Hyperplastic polyp of sigmoid colon (04/27/2021), Hypertension, Low TSH level (01/23/2021), Other insomnia (04/27/2021), Prediabetes (10/26/2021), Skin rash (4/26/2022), and Weight gain (01/23/2021)  ,  _______________________________________________________________________  Past Surgical History:   has a past surgical history that includes Hernia repair (Bilateral); Hemorroidectomy; Shoulder surgery (Right); Colonoscopy (2013); and Riley tooth extraction  ,  _______________________________________________________________________  Family History:  family history includes Hypertension in his father and mother;  Other in his father; Prostate cancer in his family and father ,  _______________________________________________________________________  Social History:   reports that he has never smoked  He has never used smokeless tobacco  He reports current alcohol use  He reports that he does not use drugs  ,  _______________________________________________________________________  Allergies:  is allergic to penicillins     _______________________________________________________________________  Current Outpatient Medications   Medication Sig Dispense Refill    amLODIPine (NORVASC) 5 mg tablet TAKE 1 TABLET BY MOUTH EVERY DAY 90 tablet 1    Blood Pressure Monitoring (Blood Pressure Kit) KIT Use daily Adult 1 each 0    CINNAMON PO Take by mouth daily      Multiple Vitamin (multivitamin) capsule Take 1 capsule by mouth daily      Cholecalciferol (D3) 50 MCG (2000 UT) TABS  (Patient not taking: Reported on 8/29/2022)      Cholecalciferol (Vitamin D-3) 125 MCG (5000 UT) TABS Take 2,000 tablets by mouth daily        clobetasol (TEMOVATE) 0 05 % cream Apply topically 2 (two) times a day (Patient not taking: Reported on 8/29/2022) 45 g 1    Omega-3 Fatty Acids (Fish Oil) 1000 MG CPDR Take by mouth daily (Patient not taking: Reported on 8/29/2022)       No current facility-administered medications for this visit      _______________________________________________________________________  Review of Systems   Constitutional: Negative for chills and fever  HENT: Negative for congestion, ear pain, hearing loss, nosebleeds, sinus pain, sore throat and trouble swallowing  Eyes: Negative for discharge, redness and visual disturbance  Respiratory: Negative for cough, chest tightness and shortness of breath  Cardiovascular: Negative for chest pain and palpitations  Gastrointestinal: Negative for abdominal pain, blood in stool, constipation, diarrhea, nausea and vomiting  Genitourinary: Negative for dysuria, flank pain, frequency and hematuria     Musculoskeletal: Negative for arthralgias, myalgias and neck pain  Skin: Negative for color change and rash  Neurological: Negative for dizziness, speech difficulty, weakness and headaches  Hematological: Does not bruise/bleed easily  Psychiatric/Behavioral: Negative for agitation and behavioral problems  Objective:  Vitals:    08/29/22 0954   BP: 124/80   BP Location: Left arm   Patient Position: Sitting   Cuff Size: Adult   Pulse: 62   Resp: 14   Temp: 98 7 °F (37 1 °C)   TempSrc: Tympanic   SpO2: 97%   Weight: 115 kg (253 lb)   Height: 5' 10" (1 778 m)     Body mass index is 36 3 kg/m²  Physical Exam  Vitals and nursing note reviewed  Constitutional:       General: He is not in acute distress  Appearance: Normal appearance  He is obese  HENT:      Head: Normocephalic and atraumatic  Right Ear: Ear canal and external ear normal       Left Ear: Ear canal and external ear normal       Nose: Nose normal       Mouth/Throat:      Mouth: Mucous membranes are moist    Eyes:      General: No scleral icterus  Right eye: No discharge  Left eye: No discharge  Extraocular Movements: Extraocular movements intact  Conjunctiva/sclera: Conjunctivae normal    Cardiovascular:      Rate and Rhythm: Normal rate and regular rhythm  Pulses: Normal pulses  Heart sounds: No murmur heard  Pulmonary:      Effort: Pulmonary effort is normal  No respiratory distress  Breath sounds: Normal breath sounds  Abdominal:      General: Bowel sounds are normal       Palpations: Abdomen is soft  Tenderness: There is no abdominal tenderness  Musculoskeletal:         General: Normal range of motion  Cervical back: Normal range of motion and neck supple  No muscular tenderness  Right lower leg: No edema  Left lower leg: No edema  Skin:     General: Skin is warm  Findings: No rash  Neurological:      General: No focal deficit present        Mental Status: He is alert and oriented to person, place, and time  Motor: No weakness  Coordination: Coordination normal    Psychiatric:         Mood and Affect: Mood normal          Behavior: Behavior normal        I spent 30 minutes with the patient today    More than 50% time spent for reviewing of external notes, reviewing of the results of diagnostics test, management of care, patient education and ordering of test

## 2022-08-29 NOTE — ASSESSMENT & PLAN NOTE
He was seen by urologist   Had MRI of prostate  Has a follow-up appointment with Urology per patient

## 2022-08-31 NOTE — PROGRESS NOTES
9/6/2022    Ryder Silence  1965  [id number]      Assessment  -Elevated PSA     Discussion/Plan  Yeison Bush is a 65 y o  male who presents in consultation    1  Elevated PSA s/p negative mpMRI prostate (2/28/2022)- unfortunately, patient did not obtain PSA prior to today's office visit  Provided patient with new order for PSA  We will call with his results  If PSA remains stable, we will continue annual prostate cancer screening and he will follow-up in 1 year with repeat PSA and DEV  Patient otherwise asymptomatic  He was advised to call sooner with any questions or issues       -All questions answered, patient agrees with plan      History of Present Illness  65 y o  male who presents in consultation today for evaluation of elevated PSA  He was referred by his PCP  His last PSA from 01/26/2022 was 4 33  Prior PSA in April 2021 was 4 25  This prompted a multiparametric MRI of prostate for further evaluation  Results of MRI of prostate from 02/28/2022 revealed PI-RADS 2, low risk for prostate cancer  Prostate measured 65 8 cc  Patient denies any strong family history of prostate cancer  He denies any lower urinary tract symptoms, gross hematuria, or dysuria  Review of Systems  Review of Systems   Constitutional: Negative  HENT: Negative  Respiratory: Negative  Cardiovascular: Negative  Gastrointestinal: Negative  Genitourinary: Negative for decreased urine volume, difficulty urinating, dysuria, flank pain, frequency, hematuria and urgency  Musculoskeletal: Negative  Skin: Negative  Neurological: Negative  Psychiatric/Behavioral: Negative        AUA SYMPTOM SCORE    Flowsheet Row Most Recent Value   AUA SYMPTOM SCORE    How often have you had a sensation of not emptying your bladder completely after you finished urinating? 0 (P)     How often have you had to urinate again less than two hours after you finished urinating? 0 (P)     How often have you found you stopped and started again several times when you urinate? 0 (P)     How often have you found it difficult to postpone urination? 0 (P)     How often have you had a weak urinary stream? 0 (P)     How often have you had to push or strain to begin urination? 0 (P)     How many times did you most typically get up to urinate from the time you went to bed at night until the time you got up in the morning? 0 (P)     Quality of Life: If you were to spend the rest of your life with your urinary condition just the way it is now, how would you feel about that? 1 (P)     AUA SYMPTOM SCORE 0 (P)           Past Medical History  Past Medical History:   Diagnosis Date    BMI 36 0-36 9,adult 04/27/2021    BMI 37 0-37 9, adult 01/26/2021    Bradycardia     Elevated PSA 04/27/2021    Hypercholesteremia 01/23/2021    Hyperglycemia 01/23/2021    Hyperplastic polyp of sigmoid colon 04/27/2021    Hypertension     Low TSH level 01/23/2021    Other insomnia 04/27/2021    Prediabetes 10/26/2021    Skin rash 4/26/2022    Weight gain 01/23/2021       Past Social History  Past Surgical History:   Procedure Laterality Date    COLONOSCOPY  2013    [place], [place]     HEMORROIDECTOMY      [id number]     HERNIA REPAIR Bilateral     In childhood     SHOULDER SURGERY Right     Pain - impingement    WISDOM TOOTH EXTRACTION         Past Family History  Family History   Problem Relation Age of Onset    Hypertension Mother     Other Father         pacemaker     Hypertension Father     Prostate cancer Father         diagnosed in his 62s     Prostate cancer Family        Past Social history  Social History     Socioeconomic History    Marital status: Single     Spouse name: Not on file    Number of children: Not on file    Years of education: Not on file    Highest education level: Not on file   Occupational History    Not on file   Tobacco Use    Smoking status: Never Smoker    Smokeless tobacco: Never Used   Vaping Use    Vaping Use: Never used Substance and Sexual Activity    Alcohol use: Yes     Comment: Occasional     Drug use: Never     Comment: No dug use - As per [employer]    Sexual activity: Yes   Other Topics Concern    Not on file   Social History Narrative    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    Lives with his mom        Sees urology in [place]; last seen 2013 in [place], [place]    - As per [address] [place] [address]     Financial Resource Strain: Not on file   Food Insecurity: Not on file   Transportation Needs: Not on file   Physical Activity: Not on file   Stress: Not on file   Social Connections: Not on file   Intimate Partner Violence: Not on file   Housing Stability: Not on file       Current Medications  Current Outpatient Medications   Medication Sig Dispense Refill    amLODIPine (NORVASC) 5 mg tablet TAKE 1 TABLET BY MOUTH EVERY DAY 90 tablet 1    Blood Pressure Monitoring (Blood Pressure Kit) KIT Use daily Adult 1 each 0    Cholecalciferol (D3) 50 MCG (2000 UT) TABS  (Patient not taking: Reported on 8/29/2022)      Cholecalciferol (Vitamin D-3) 125 MCG (5000 UT) TABS Take 2,000 tablets by mouth daily        CINNAMON PO Take by mouth daily      clobetasol (TEMOVATE) 0 05 % cream Apply topically 2 (two) times a day (Patient not taking: Reported on 8/29/2022) 45 g 1    Multiple Vitamin (multivitamin) capsule Take 1 capsule by mouth daily      Omega-3 Fatty Acids (Fish Oil) 1000 MG CPDR Take by mouth daily (Patient not taking: Reported on 8/29/2022)       No current facility-administered medications for this visit  Allergies  Allergies   Allergen Reactions    Penicillins Swelling       Past Medical History, Social History, Family History, medications and allergies were reviewed  Vitals  There were no vitals filed for this visit  Physical Exam  Physical Exam  Constitutional:       Appearance: Normal appearance  He is well-developed  HENT:      Head: Normocephalic  Eyes:      Pupils: Pupils are equal, round, and reactive to light  Pulmonary:      Effort: Pulmonary effort is normal    Abdominal:      Palpations: Abdomen is soft  Musculoskeletal:         General: Normal range of motion  Cervical back: Normal range of motion  Skin:     General: Skin is warm and dry  Neurological:      General: No focal deficit present  Mental Status: He is alert and oriented to person, place, and time  Psychiatric:         Mood and Affect: Mood normal          Behavior: Behavior normal          Thought Content: Thought content normal          Judgment: Judgment normal          Results    I have personally reviewed all pertinent lab results and reviewed with patient  No results found for: PSA  Lab Results   Component Value Date    CALCIUM 8 9 05/04/2020    K 3 8 05/04/2020    CO2 29 05/04/2020     05/04/2020    BUN 9 05/04/2020    CREATININE 1 11 05/04/2020     Lab Results   Component Value Date    WBC 6 16 05/04/2020    HGB 15 1 05/04/2020    HCT 46 2 05/04/2020    MCV 92 05/04/2020     05/04/2020     No results found for this or any previous visit (from the past 1 hour(s))

## 2022-09-06 ENCOUNTER — OFFICE VISIT (OUTPATIENT)
Dept: UROLOGY | Facility: AMBULATORY SURGERY CENTER | Age: 57
End: 2022-09-06
Payer: COMMERCIAL

## 2022-09-06 VITALS
DIASTOLIC BLOOD PRESSURE: 90 MMHG | WEIGHT: 256 LBS | BODY MASS INDEX: 36.73 KG/M2 | SYSTOLIC BLOOD PRESSURE: 130 MMHG | OXYGEN SATURATION: 98 % | HEART RATE: 65 BPM

## 2022-09-06 DIAGNOSIS — R97.20 ELEVATED PSA: Primary | ICD-10-CM

## 2022-09-06 PROCEDURE — 99213 OFFICE O/P EST LOW 20 MIN: CPT | Performed by: NURSE PRACTITIONER

## 2022-09-08 ENCOUNTER — TELEPHONE (OUTPATIENT)
Dept: UROLOGY | Facility: AMBULATORY SURGERY CENTER | Age: 57
End: 2022-09-08

## 2022-09-08 DIAGNOSIS — R97.20 ELEVATED PSA: Primary | ICD-10-CM

## 2022-09-08 NOTE — TELEPHONE ENCOUNTER
Left message per communication form advising patient of AP's note  Office number provided to schedule follow up in 6 months with PSA  PSA order has been placed

## 2022-09-08 NOTE — TELEPHONE ENCOUNTER
----- Message from [id number] Jaime Graham sent at 9/8/2022 11:46 AM EDT -----  Please inform patient recent PSA 5 37, previously 4 33  Recent multiparametric MRI of prostate revealed PI-RADS 2  Given elevated PSA value, would recommend follow-up in 6 months with PSA verses 1 year to ensure stability  Please assist with scheduling

## 2022-09-12 ENCOUNTER — HOSPITAL ENCOUNTER (OUTPATIENT)
Dept: RADIOLOGY | Facility: HOSPITAL | Age: 57
Discharge: HOME | End: 2022-09-12
Attending: INTERNAL MEDICINE
Payer: COMMERCIAL

## 2022-09-12 DIAGNOSIS — E05.90 SUBCLINICAL HYPERTHYROIDISM: ICD-10-CM

## 2022-09-12 PROCEDURE — 76536 US EXAM OF HEAD AND NECK: CPT

## 2022-09-22 DIAGNOSIS — E04.2 MULTIPLE THYROID NODULES: Primary | ICD-10-CM

## 2022-10-22 DIAGNOSIS — I10 ESSENTIAL HYPERTENSION: ICD-10-CM

## 2022-10-24 RX ORDER — AMLODIPINE BESYLATE 5 MG/1
TABLET ORAL
Qty: 90 TABLET | Refills: 1 | Status: SHIPPED | OUTPATIENT
Start: 2022-10-24

## 2022-11-10 ENCOUNTER — OFFICE VISIT (OUTPATIENT)
Dept: FAMILY MEDICINE CLINIC | Facility: CLINIC | Age: 57
End: 2022-11-10

## 2022-11-10 ENCOUNTER — TELEPHONE (OUTPATIENT)
Dept: OTHER | Facility: OTHER | Age: 57
End: 2022-11-10

## 2022-11-10 ENCOUNTER — TELEPHONE (OUTPATIENT)
Dept: INTERNAL MEDICINE CLINIC | Facility: CLINIC | Age: 57
End: 2022-11-10

## 2022-11-10 VITALS
HEART RATE: 67 BPM | DIASTOLIC BLOOD PRESSURE: 76 MMHG | RESPIRATION RATE: 18 BRPM | OXYGEN SATURATION: 99 % | SYSTOLIC BLOOD PRESSURE: 126 MMHG | WEIGHT: 255 LBS | BODY MASS INDEX: 36.51 KG/M2 | TEMPERATURE: 97.2 F | HEIGHT: 70 IN

## 2022-11-10 DIAGNOSIS — M54.50 ACUTE LOW BACK PAIN, UNSPECIFIED BACK PAIN LATERALITY, UNSPECIFIED WHETHER SCIATICA PRESENT: Primary | ICD-10-CM

## 2022-11-10 DIAGNOSIS — I10 ESSENTIAL HYPERTENSION: ICD-10-CM

## 2022-11-10 DIAGNOSIS — M62.830 SPASM OF MUSCLE OF LOWER BACK: ICD-10-CM

## 2022-11-10 RX ORDER — CYCLOBENZAPRINE HCL 10 MG
10 TABLET ORAL 2 TIMES DAILY PRN
Qty: 10 TABLET | Refills: 0 | Status: SHIPPED | OUTPATIENT
Start: 2022-11-10 | End: 2022-11-15

## 2022-11-10 RX ORDER — CYCLOBENZAPRINE HCL 5 MG
10 TABLET ORAL 2 TIMES DAILY PRN
Qty: 10 TABLET | Refills: 0 | Status: SHIPPED | OUTPATIENT
Start: 2022-11-10 | End: 2022-11-10

## 2022-11-10 RX ORDER — NAPROXEN 500 MG/1
500 TABLET ORAL 2 TIMES DAILY PRN
Qty: 20 TABLET | Refills: 0 | Status: SHIPPED | OUTPATIENT
Start: 2022-11-10 | End: 2022-11-20

## 2022-11-10 NOTE — ASSESSMENT & PLAN NOTE
Patient noted of lower back spasm, currently order for Flexeril 10 mg p o  b i d  p r n  patient also to do lower back exercises routinely as well as use price therapy  Instructed that if he requires physical therapy we can refer him next visit

## 2022-11-10 NOTE — PATIENT INSTRUCTIONS
Lower Back Exercises   WHAT YOU NEED TO KNOW:   What do I need to know about lower back exercises? Lower back exercises help heal and strengthen your back muscles to prevent another injury  Ask your healthcare provider if you need to see a physical therapist for more advanced exercises  Do the exercises on a mat or firm surface  (not on a bed) to support your spine and prevent low back pain  Move slowly and smoothly  Avoid fast or jerky motions  Breathe normally  Do not hold your breath  Stop if you feel pain  It is normal to feel some discomfort at first  Regular exercise will help decrease your discomfort over time  How do I perform lower back exercises safely? Your healthcare provider may recommend that you do back exercises 10 to 30 minutes each day  He may also recommend that you do exercises 1 to 3 times each day  Ask your healthcare provider which exercises are best for you and how often to do them  Ankle pumps:  Lie on your back  Move your foot up (with your toes pointing toward your head)  Then, move your foot down (with your toes pointing away from you)  Repeat this exercise 10 times on each side  Heel slides:  Lie on your back  Slowly bend one leg and then straighten it  Next, bend the other leg and then straighten it  Repeat 10 times on each side  Pelvic tilt:  Lie on your back with your knees bent and feet flat on the floor  Place your arms in a relaxed position beside your body  Tighten the muscles of your abdomen and flatten your back against the floor  Hold for 5 seconds  Repeat 5 times  Back stretch:  Lie on your back with your hands behind your head  Bend your knees and turn the lower half of your body to one side  Hold this position for 10 seconds  Repeat 3 times on each side  Straight leg raises:  Lie on your back with one leg straight  Bend the other knee   Tighten your abdomen and then slowly lift the straight leg up about 6 to 12 inches off the floor  Hold for 1 to 5 seconds  Lower your leg slowly  Repeat 10 times on each leg  Knee-to-chest:  Lie on your back with your knees bent and feet flat on the floor  Pull one of your knees toward your chest and hold it there for 5 seconds  Return your leg to the starting position  Lift the other knee toward your chest and hold for 5 seconds  Do this 5 times on each side  Cat and camel:  Place your hands and knees on the floor  Arch your back upward toward the ceiling and lower your head  Round out your spine as much as you can  Hold for 5 seconds  Lift your head upward and push your chest downward toward the floor  Hold for 5 seconds  Do 3 sets or as directed  Wall squats:  Stand with your back against a wall  Tighten the muscles of your abdomen  Slowly lower your body until your knees are bent at a 45 degree angle  Hold this position for 5 seconds  Slowly move back up to a standing position  Repeat 10 times  Curl up:  Lie on your back with your knees bent and feet flat on the floor  Place your hands, palms down, underneath the curve in your lower back  Next, with your elbows on the floor, lift your shoulders and chest 2 to 3 inches  Keep your head in line with your shoulders  Hold this position for 5 seconds  When you can do this exercise without pain for 10 to 15 seconds, you may add a rotation  While your shoulders and chest are lifted off the ground, turn slightly to the left and hold  Repeat on the other side  Bird dog:  Place your hands and knees on the floor  Keep your wrists directly below your shoulders and your knees directly below your hips  Pull your belly button in toward your spine  Do not flatten or arch your back  Tighten your abdominal muscles  Raise one arm straight out so that it is aligned with your head  Next, raise the leg opposite your arm  Hold this position for 15 seconds  Lower your arm and leg slowly and change sides  Do 5 sets         When should I seek immediate care? You have severe pain that prevents you from moving  When should I contact my healthcare provider? Your pain becomes worse  You have new pain  You have questions or concerns about your condition or care  CARE AGREEMENT:   You have the right to help plan your care  Learn about your health condition and how it may be treated  Discuss treatment options with your healthcare providers to decide what care you want to receive  You always have the right to refuse treatment  The above information is an  only  It is not intended as medical advice for individual conditions or treatments  Talk to your doctor, nurse or pharmacist before following any medical regimen to see if it is safe and effective for you  © Copyright [employer] 2022 Information is for End User's use only and may not be sold, redistributed or otherwise used for commercial purposes  All illustrations and images included in CareNotes® are the copyrighted property of [employer]  or [employer] [facility]  Acute Low Back Pain   WHAT YOU NEED TO KNOW:   What is acute low back pain? Acute low back pain is sudden discomfort that lasts up to 6 weeks and makes activity difficult  What causes or increases my risk for acute low back pain? Conditions that affect the spine, joints, or muscles can cause back pain  These may include arthritis, spinal stenosis (narrowing of the spinal column), muscle tension, or breakdown of the spinal discs  The following increase your risk for back pain:  Repeated bending, lifting, or twisting, or lifting heavy items    Injury from a fall or accident    Lack of regular physical activity    Obesity or pregnancy    Smoking    Aging    Driving, sitting, or standing for long periods    Bad posture while sitting or standing    How is the cause of acute low back pain diagnosed? Your healthcare provider will ask about your medical history and examine you   He or she may ask when you last had low back pain and how it started  Show him or her where you feel the pain and what makes it better or worse  Tell your provider about the type of pain you have, how bad it is, and how long it lasts  Tell him or her if your pain worsens at night or when you lie on your back  How is acute low back pain treated? The goal of treatment is to relieve your pain and help you be able to do your regular activities  Most people with acute low back pain get better within 4 to 6 weeks  You may need any of the following:  NSAIDs , such as ibuprofen, help decrease swelling, pain, and fever  This medicine is available with or without a doctor's order  NSAIDs can cause stomach bleeding or kidney problems in certain people  If you take blood thinner medicine, always ask your healthcare provider if NSAIDs are safe for you  Always read the medicine label and follow directions  Acetaminophen  decreases pain and fever  It is available without a doctor's order  Ask how much to take and how often to take it  Follow directions  Read the labels of all other medicines you are using to see if they also contain acetaminophen, or ask your doctor or pharmacist  Acetaminophen can cause liver damage if not taken correctly  Do not use more than 4 grams (4,000 milligrams) total of acetaminophen in one day  Muscle relaxers  decrease pain by relaxing the muscles in your lower spine  Prescription pain medicine  may be given  Ask your healthcare provider how to take this medicine safely  Some prescription pain medicines contain acetaminophen  Do not take other medicines that contain acetaminophen without talking to your healthcare provider  Too much acetaminophen may cause liver damage  Prescription pain medicine may cause constipation  Ask your healthcare provider how to prevent or treat constipation  What can I do to prevent low back pain? Use proper body mechanics        Bend at the hips and knees when you  objects  Do not bend from the waist  Use your leg muscles as you lift the load  Do not use your back  Keep the object close to your chest as you lift it  Try not to twist or lift anything above your waist          Change your position often when you stand for long periods of time  Rest one foot on a small box or footrest, and then switch to the other foot often  Try not to sit for long periods of time  When you do, sit in a straight-backed chair with your feet flat on the floor  Never reach, pull, or push while you are sitting  Do exercises that strengthen your back muscles  Warm up before you exercise  Ask your healthcare provider for the best exercises for you  Maintain a healthy weight  Ask your healthcare provider what a healthy weight is for you  Ask him or her to help you create a weight loss plan if you are overweight  How can I take care of myself if I have acute low back pain? Stay active  as much as you can without causing more pain  Bed rest could make your back pain worse  Start with some light exercises, such as walking  Avoid heavy lifting until your pain is gone  Ask for more information about the activities or exercises that are right for you  Apply heat  on your back for 20 to 30 minutes every 2 hours for as many days as directed  Heat helps decrease pain and muscle spasms  Alternate heat and ice  Apply ice  on your back for 15 to 20 minutes every hour or as directed  Use an ice pack, or put crushed ice in a plastic bag  Cover it with a towel before you apply it to your skin  Ice helps prevent tissue damage and decreases swelling and pain  When should I seek immediate care? You have severe pain  You have sudden stiffness and heaviness down both legs  You have numbness or weakness in one leg, or pain in both legs  You have numbness in your genital area or across your lower back  You cannot control your urine or bowel movements      When should I call my doctor? You have a fever  You have pain at night or when you rest     Your pain does not get better with treatment  You have pain that worsens when you cough or sneeze  You suddenly feel something pop or snap in your back  You have questions or concerns about your condition or care  CARE AGREEMENT:   You have the right to help plan your care  Learn about your health condition and how it may be treated  Discuss treatment options with your healthcare providers to decide what care you want to receive  You always have the right to refuse treatment  The above information is an  only  It is not intended as medical advice for individual conditions or treatments  Talk to your doctor, nurse or pharmacist before following any medical regimen to see if it is safe and effective for you  © Copyright [employer] 2022 Information is for End User's use only and may not be sold, redistributed or otherwise used for commercial purposes  All illustrations and images included in CareNotes® are the copyrighted property of [employer]  or [employer]  Low Back Strain   WHAT YOU NEED TO KNOW:   Low back strain is an injury to your lower back muscles or tendons  Tendons are strong tissues that connect muscles to bones  The lower back supports most of your body weight and helps you move, twist, and bend  DISCHARGE INSTRUCTIONS:   Return to the emergency department if:   You hear or feel a pop in your lower back  You have increased swelling or pain in your lower back  You have trouble moving your legs  Your legs are numb  Call your doctor if:   You have a fever  Your pain does not go away, even after treatment  You have questions or concerns about your condition or care  Medicines: The following medicines may be ordered by your healthcare provider:  Acetaminophen  decreases pain and fever  It is available without a doctor's order   Ask how much to take and how often to take it  Follow directions  Read the labels of all other medicines you are using to see if they also contain acetaminophen, or ask your doctor or pharmacist  Acetaminophen can cause liver damage if not taken correctly  Do not use more than 4 grams (4,000 milligrams) total of acetaminophen in one day  NSAIDs , such as ibuprofen, help decrease swelling, pain, and fever  This medicine is available with or without a doctor's order  NSAIDs can cause stomach bleeding or kidney problems in certain people  If you take blood thinner medicine, always ask your healthcare provider if NSAIDs are safe for you  Always read the medicine label and follow directions  Muscle relaxers  help decrease pain and muscle spasms  Prescription pain medicine  may be given  Ask your healthcare provider how to take this medicine safely  Some prescription pain medicines contain acetaminophen  Do not take other medicines that contain acetaminophen without talking to your healthcare provider  Too much acetaminophen may cause liver damage  Prescription pain medicine may cause constipation  Ask your healthcare provider how to prevent or treat constipation  Take your medicine as directed  Contact your healthcare provider if you think your medicine is not helping or if you have side effects  Tell him or her if you are allergic to any medicine  Keep a list of the medicines, vitamins, and herbs you take  Include the amounts, and when and why you take them  Bring the list or the pill bottles to follow-up visits  Carry your medicine list with you in case of an emergency  Self-care:   Rest  as directed  You may need to rest in bed for a period of time after your injury  Do not lift heavy objects  Apply ice  on your back for 15 to 20 minutes every hour or as directed  Use an ice pack, or put crushed ice in a plastic bag  Cover it with a towel  Ice helps prevent tissue damage and decreases swelling and pain      Apply heat  on your lower back for 20 to 30 minutes every 2 hours for as many days as directed  Heat helps decrease pain and muscle spasms  Slowly start to increase your activity  as the pain decreases, or as directed  Prevent another low back strain:   Use correct body movements  Bend at the hips and knees when you  objects  Do not bend from the waist  Use your leg muscles as you lift the load  Do not use your back  Keep the object close to your chest as you lift it  Try not to twist or lift anything above your waist          Change your position often when you stand for long periods of time  Rest one foot on a small box or footrest, and then switch to the other foot often  Try not to sit for long periods of time  When you do, sit in a straight-backed chair with your feet flat on the floor  Never reach, pull, or push while you are sitting  Warm up before you exercise  Do exercises that strengthen your back muscles  Ask your healthcare provider about the best exercise plan for you  Maintain a healthy weight  Ask your healthcare provider how much you should weigh  Ask him to help you create a weight loss plan if you are overweight  © Copyright [employer] 2022 Information is for End User's use only and may not be sold, redistributed or otherwise used for commercial purposes  All illustrations and images included in CareNotes® are the copyrighted property of [employer] M [employer] Inc  or [facility] [employer]   The above information is an  only  It is not intended as medical advice for individual conditions or treatments  Talk to your doctor, nurse or pharmacist before following any medical regimen to see if it is safe and effective for you

## 2022-11-10 NOTE — LETTER
November 10, 2022     Patient: Antonio Harrison  YOB: 1965  Date of Visit: 11/10/2022      To Whom it May Concern:    Antonio Harrison is under my professional care  Mi Peña was seen in my office on 11/10/2022  Mi Peña may return to work on 11/14/2022       If you have any questions or concerns, please don't hesitate to call           Sincerely,          DANY Vasquez        CC: No Recipients

## 2022-11-10 NOTE — PROGRESS NOTES
BMI Counseling: Body mass index is 36 59 kg/m²  The BMI is above normal  Nutrition recommendations include decreasing portion sizes, encouraging healthy choices of fruits and vegetables, decreasing fast food intake, consuming healthier snacks, limiting drinks that contain sugar, moderation in carbohydrate intake, increasing intake of lean protein, reducing intake of saturated and trans fat and reducing intake of cholesterol  Exercise recommendations include vigorous physical activity 75 minutes/week, exercising 3-5 times per week, obtaining a gym membership and strength training exercises  No pharmacotherapy was ordered  Rationale for BMI follow-up plan is due to patient being overweight or obese  Depression Screening and Follow-up Plan: Patient was screened for depression during today's encounter  They screened negative with a PHQ-2 score of 2  Assessment/Plan:         Problem List Items Addressed This Visit        Cardiovascular and Mediastinum    Essential hypertension     BP currently 126/76  Patient maintain on amlodipine 5 mg p o  daily  Patient maintain a low-sodium diet and exercise 3-5 times per week for at least 75 minutes of cardiovascular activity  Recheck BP next office visit  Other    BMI 36 0-36 9,adult     BMP 36 59 kg/M2  Goal BMI 25 kg/M2  Patient counseled on proper diet, nutrition and exercise  Recheck BMI next office visit  Patient currently referred to weight management for consultation  Instructed that with weight loss patient may have decrease lower back pain  Relevant Orders    Ambulatory Referral to Weight Management    Acute low back pain - Primary     Patient noted of lower back pain, instructed on lower back exercises, price therapy, and use of anti-inflammatories routinely  Patient to be on back rest until Monday           Relevant Medications    naproxen (Naprosyn) 500 mg tablet    Spasm of muscle of lower back     Patient noted of lower back spasm, currently order for Flexeril 10 mg p o  b i d  p r n  patient also to do lower back exercises routinely as well as use price therapy  Instructed that if he requires physical therapy we can refer him next visit  Relevant Medications    cyclobenzaprine (FLEXERIL) 10 mg tablet            Subjective:      Patient ID: Yuri Stroud is a 65 y o  male  Patient is a 65year old male present for reports of lower back pain that occurred this week on Tuesday night when going to  something on the ground  Reports pain to be 8/10  Continues to have pain when trying to sit for long periods of time, bend to  anything and lifting  The following portions of the patient's history were reviewed and updated as appropriate:   Past Medical History:  He has a past medical history of BMI 36 0-36 9,adult (04/27/2021), BMI 37 0-37 9, adult (01/26/2021), Bradycardia, Elevated PSA (04/27/2021), Hypercholesteremia (01/23/2021), Hyperglycemia (01/23/2021), Hyperplastic polyp of sigmoid colon (04/27/2021), Hypertension, Low TSH level (01/23/2021), Other insomnia (04/27/2021), Prediabetes (10/26/2021), Skin rash (4/26/2022), and Weight gain (01/23/2021)  ,  _______________________________________________________________________  Medical Problems:  does not have any pertinent problems on file ,  _______________________________________________________________________  Past Surgical History:   has a past surgical history that includes Hernia repair (Bilateral); Hemorroidectomy; Shoulder surgery (Right); Colonoscopy (2013); and Haugen tooth extraction  ,  _______________________________________________________________________  Family History:  family history includes Hypertension in his father and mother; Other in his father; Prostate cancer in his family and father ,  _______________________________________________________________________  Social History:   reports that he has never smoked   He has never used smokeless tobacco  He reports current alcohol use  He reports that he does not use drugs  ,  _______________________________________________________________________  Allergies:  is allergic to penicillins     _______________________________________________________________________  Current Outpatient Medications   Medication Sig Dispense Refill   • amLODIPine (NORVASC) 5 mg tablet TAKE 1 TABLET BY MOUTH EVERY DAY 90 tablet 1   • Blood Pressure Monitoring (Blood Pressure Kit) KIT Use daily Adult 1 each 0   • CINNAMON PO Take by mouth daily     • clobetasol (TEMOVATE) 0 05 % cream Apply topically 2 (two) times a day 45 g 1   • cyclobenzaprine (FLEXERIL) 10 mg tablet Take 1 tablet (10 mg total) by mouth 2 (two) times a day as needed for muscle spasms for up to 5 days 10 tablet 0   • Multiple Vitamin (multivitamin) capsule Take 1 capsule by mouth daily     • naproxen (Naprosyn) 500 mg tablet Take 1 tablet (500 mg total) by mouth 2 (two) times a day as needed for mild pain for up to 10 days Take with food  20 tablet 0   • Cholecalciferol (D3) 50 MCG (2000 UT) TABS  (Patient not taking: No sig reported)     • Cholecalciferol (Vitamin D-3) 125 MCG (5000 UT) TABS Take 2,000 tablets by mouth daily       • Omega-3 Fatty Acids (Fish Oil) 1000 MG CPDR Take by mouth daily (Patient not taking: No sig reported)       No current facility-administered medications for this visit      _______________________________________________________________________  Review of Systems   Constitutional: Negative for activity change, appetite change, chills, fatigue, fever and unexpected weight change  HENT: Negative for congestion, ear discharge, ear pain, nosebleeds, postnasal drip, rhinorrhea, sinus pressure, sinus pain, sneezing, sore throat and voice change  Eyes: Negative for pain, redness and visual disturbance  Respiratory: Negative for cough, chest tightness, shortness of breath and wheezing      Cardiovascular: Negative for chest pain and palpitations  Gastrointestinal: Negative for abdominal distention, abdominal pain, constipation, diarrhea, nausea and vomiting  Endocrine: Negative  Genitourinary: Negative for difficulty urinating, dysuria, flank pain, frequency, hematuria and urgency  Musculoskeletal: Positive for back pain  Negative for arthralgias and myalgias  Patient reports lower lumbar back pain that radiates to his abdomen  Indicates that he was bending to  something and then his back started to hurt  Pain is described as 8/10 on the pain scale  Currently has been using icy hot on the area but has not given him relief  Skin: Negative  Allergic/Immunologic: Negative  Neurological: Negative  Hematological: Negative  Psychiatric/Behavioral: Negative  Objective:  Vitals:    11/10/22 1417   BP: 126/76   BP Location: Right arm   Patient Position: Sitting   Cuff Size: Large   Pulse: 67   Resp: 18   Temp: (!) 97 2 °F (36 2 °C)   TempSrc: Temporal   SpO2: 99%   Weight: 116 kg (255 lb)   Height: 5' 10" (1 778 m)     Body mass index is 36 59 kg/m²  Physical Exam  Vitals and nursing note reviewed  Constitutional:       Appearance: Normal appearance  He is well-developed  He is obese  HENT:      Head: Normocephalic and atraumatic  Right Ear: Tympanic membrane, ear canal and external ear normal       Left Ear: Tympanic membrane, ear canal and external ear normal       Nose: Nose normal  No congestion or rhinorrhea  Mouth/Throat:      Mouth: Mucous membranes are moist       Pharynx: No oropharyngeal exudate or posterior oropharyngeal erythema  Eyes:      Extraocular Movements: Extraocular movements intact  Conjunctiva/sclera: Conjunctivae normal       Pupils: Pupils are equal, round, and reactive to light  Cardiovascular:      Rate and Rhythm: Normal rate and regular rhythm  Pulses: Normal pulses  Heart sounds: Normal heart sounds  No murmur heard    Pulmonary: Effort: Pulmonary effort is normal       Breath sounds: Normal breath sounds  Abdominal:      General: Bowel sounds are normal       Palpations: Abdomen is soft  Musculoskeletal:         General: Tenderness present  Normal range of motion  Cervical back: Normal range of motion  Comments: Lower back point tenderness bilaterally  Patient is able to heel-toe walk  He currently has difficulty bending to touch his toes and can only go 90°  Patient is able to stand on his Tippy toes to try to touch the ceiling and hyperextend  Patient is only able to crouch down 50% of the way at this time with noted pain  Skin:     General: Skin is warm  Capillary Refill: Capillary refill takes less than 2 seconds  Neurological:      General: No focal deficit present  Mental Status: He is alert and oriented to person, place, and time     Psychiatric:         Mood and Affect: Mood normal          Behavior: Behavior normal

## 2022-11-10 NOTE — ASSESSMENT & PLAN NOTE
BP currently 126/76  Patient maintain on amlodipine 5 mg p o  daily  Patient maintain a low-sodium diet and exercise 3-5 times per week for at least 75 minutes of cardiovascular activity  Recheck BP next office visit

## 2022-11-10 NOTE — ASSESSMENT & PLAN NOTE
Patient noted of lower back pain, instructed on lower back exercises, price therapy, and use of anti-inflammatories routinely  Patient to be on back rest until Monday

## 2022-11-10 NOTE — ASSESSMENT & PLAN NOTE
BMP 36 59 kg/M2  Goal BMI 25 kg/M2  Patient counseled on proper diet, nutrition and exercise  Recheck BMI next office visit  Patient currently referred to weight management for consultation  Instructed that with weight loss patient may have decrease lower back pain

## 2022-11-10 NOTE — TELEPHONE ENCOUNTER
Marques - patient called - hurt his lower back on Tues - you schedule is full - I offered apt with [facility], pt agreed - will see at 2 pm

## 2023-01-04 ENCOUNTER — HOSPITAL ENCOUNTER (EMERGENCY)
Facility: HOSPITAL | Age: 58
Discharge: HOME | End: 2023-01-05
Attending: EMERGENCY MEDICINE

## 2023-01-04 VITALS
RESPIRATION RATE: 22 BRPM | SYSTOLIC BLOOD PRESSURE: 174 MMHG | TEMPERATURE: 99.6 F | DIASTOLIC BLOOD PRESSURE: 95 MMHG | HEART RATE: 75 BPM | OXYGEN SATURATION: 100 %

## 2023-01-04 DIAGNOSIS — J06.9 ACUTE URI: Primary | ICD-10-CM

## 2023-01-04 NOTE — Clinical Note
Bhupinder Francesco was seen and treated in our emergency department on 1/4/2023  Diagnosis:     Nevaeh Garrett  may return to work on return date  He may return on this date: 01/09/2023         If you have any questions or concerns, please don't hesitate to call        Bing Alfonso MD    ______________________________           _______________          _______________  Hospital Representative                              Date                                Time

## 2023-01-05 ENCOUNTER — OFFICE VISIT (OUTPATIENT)
Dept: INTERNAL MEDICINE CLINIC | Facility: CLINIC | Age: 58
End: 2023-01-05

## 2023-01-05 VITALS
SYSTOLIC BLOOD PRESSURE: 130 MMHG | DIASTOLIC BLOOD PRESSURE: 80 MMHG | WEIGHT: 263 LBS | HEART RATE: 82 BPM | HEIGHT: 70 IN | RESPIRATION RATE: 18 BRPM | TEMPERATURE: 98.8 F | OXYGEN SATURATION: 98 % | BODY MASS INDEX: 37.65 KG/M2

## 2023-01-05 DIAGNOSIS — R97.20 ELEVATED PSA: ICD-10-CM

## 2023-01-05 DIAGNOSIS — J40 BRONCHITIS: ICD-10-CM

## 2023-01-05 DIAGNOSIS — E55.9 VITAMIN D DEFICIENCY: ICD-10-CM

## 2023-01-05 DIAGNOSIS — M54.50 ACUTE RIGHT-SIDED LOW BACK PAIN, UNSPECIFIED WHETHER SCIATICA PRESENT: ICD-10-CM

## 2023-01-05 DIAGNOSIS — I10 ESSENTIAL HYPERTENSION: Primary | ICD-10-CM

## 2023-01-05 DIAGNOSIS — R73.03 PREDIABETES: ICD-10-CM

## 2023-01-05 DIAGNOSIS — E78.00 HYPERCHOLESTEREMIA: ICD-10-CM

## 2023-01-05 LAB
FLUAV RNA RESP QL NAA+PROBE: NEGATIVE
FLUBV RNA RESP QL NAA+PROBE: NEGATIVE
RSV RNA RESP QL NAA+PROBE: NEGATIVE
S PYO DNA THROAT QL NAA+PROBE: NOT DETECTED
SARS-COV-2 RNA RESP QL NAA+PROBE: NEGATIVE

## 2023-01-05 RX ORDER — IBUPROFEN 600 MG/1
600 TABLET ORAL ONCE
Status: COMPLETED | OUTPATIENT
Start: 2023-01-05 | End: 2023-01-05

## 2023-01-05 RX ORDER — AZITHROMYCIN 250 MG/1
TABLET, FILM COATED ORAL
Qty: 6 TABLET | Refills: 0 | Status: SHIPPED | OUTPATIENT
Start: 2023-01-05 | End: 2023-01-10

## 2023-01-05 RX ORDER — ACETAMINOPHEN 325 MG/1
975 TABLET ORAL ONCE
Status: COMPLETED | OUTPATIENT
Start: 2023-01-05 | End: 2023-01-05

## 2023-01-05 RX ORDER — DEXTROMETHORPHAN HYDROBROMIDE AND PROMETHAZINE HYDROCHLORIDE 15; 6.25 MG/5ML; MG/5ML
5 SOLUTION ORAL EVERY 6 HOURS PRN
Qty: 120 ML | Refills: 0 | Status: SHIPPED | OUTPATIENT
Start: 2023-01-05

## 2023-01-05 RX ADMIN — ACETAMINOPHEN 975 MG: 325 TABLET, FILM COATED ORAL at 00:15

## 2023-01-05 RX ADMIN — IBUPROFEN 600 MG: 600 TABLET, FILM COATED ORAL at 00:15

## 2023-01-05 NOTE — ED PROVIDER NOTES
History  Chief Complaint   Patient presents with   • Cough     Started with runny nose, progressed to cough and sore throat since yesterday     Patient is a 18-year-old male seen in the emergency department with concern for runny nose, cough, congestion, sore throat which began 1 day prior to evaluation  Patient notes no fever, abdominal pain, nausea, vomiting  Patient notes minimal improvement with Mucinex and tea at home  Patient notes no definite clear known sick contacts with similar symptoms  Patient notes some pain with swallowing  Prior to Admission Medications   Prescriptions Last Dose Informant Patient Reported? Taking? Blood Pressure Monitoring (Blood Pressure Kit) KIT   No No   Sig: Use daily Adult   CINNAMON PO   Yes No   Sig: Take by mouth daily   Cholecalciferol (D3) 50 MCG (2000 UT) TABS   Yes No   Patient not taking: No sig reported   Cholecalciferol (Vitamin D-3) 125 MCG (5000 UT) TABS   Yes No   Sig: Take 2,000 tablets by mouth daily     Multiple Vitamin (multivitamin) capsule   Yes No   Sig: Take 1 capsule by mouth daily   Omega-3 Fatty Acids (Fish Oil) 1000 MG CPDR   Yes No   Sig: Take by mouth daily   Patient not taking: No sig reported   amLODIPine (NORVASC) 5 mg tablet   No No   Sig: TAKE 1 TABLET BY MOUTH EVERY DAY   clobetasol (TEMOVATE) 0 05 % cream   No No   Sig: Apply topically 2 (two) times a day   cyclobenzaprine (FLEXERIL) 10 mg tablet   No No   Sig: Take 1 tablet (10 mg total) by mouth 2 (two) times a day as needed for muscle spasms for up to 5 days   naproxen (Naprosyn) 500 mg tablet   No No   Sig: Take 1 tablet (500 mg total) by mouth 2 (two) times a day as needed for mild pain for up to 10 days Take with food        Facility-Administered Medications: None       Past Medical History:   Diagnosis Date   • BMI 36 0-36 9,adult 04/27/2021   • BMI 37 0-37 9, adult 01/26/2021   • Bradycardia    • Elevated PSA 04/27/2021   • Hypercholesteremia 01/23/2021   • Hyperglycemia 01/23/2021   • Hyperplastic polyp of sigmoid colon 04/27/2021   • Hypertension    • Low TSH level 01/23/2021   • Other insomnia 04/27/2021   • Prediabetes 10/26/2021   • Skin rash 4/26/2022   • Weight gain 01/23/2021       Past Surgical History:   Procedure Laterality Date   • COLONOSCOPY  2013    [place], [place]    • HEMORROIDECTOMY      [id number]    • HERNIA REPAIR Bilateral     In childhood    • SHOULDER SURGERY Right     Pain - impingement   • WISDOM TOOTH EXTRACTION         Family History   Problem Relation Age of Onset   • Hypertension Mother    • Other Father         pacemaker    • Hypertension Father    • Prostate cancer Father         diagnosed in his 62s    • Prostate cancer Family      I have reviewed and agree with the history as documented  E-Cigarette/Vaping   • E-Cigarette Use Never User      E-Cigarette/Vaping Substances   • Nicotine No    • THC No    • CBD No    • Flavoring No    • Other No    • Unknown No      Social History     Tobacco Use   • Smoking status: Never   • Smokeless tobacco: Never   Vaping Use   • Vaping Use: Never used   Substance Use Topics   • Alcohol use: Yes     Comment: Occasional    • Drug use: Never     Comment: No dug use - As per [employer]       Review of Systems   Constitutional: Negative for chills and fever  HENT: Positive for rhinorrhea and sore throat  Negative for ear pain  Eyes: Negative for pain and visual disturbance  Respiratory: Positive for cough  Negative for shortness of breath  Cardiovascular: Negative for palpitations and leg swelling  Gastrointestinal: Negative for abdominal pain and vomiting  Genitourinary: Negative for decreased urine volume and difficulty urinating  Musculoskeletal: Negative for gait problem and neck stiffness  Skin: Negative for color change and rash  Neurological: Negative for seizures and syncope  Psychiatric/Behavioral: Negative for agitation and confusion         Physical Exam  Physical Exam  Vitals and nursing note reviewed  Constitutional:       General: He is not in acute distress  Appearance: He is well-developed  HENT:      Head: Normocephalic and atraumatic  Right Ear: External ear normal       Left Ear: External ear normal       Nose: Nose normal       Mouth/Throat:      Mouth: Mucous membranes are moist       Pharynx: Posterior oropharyngeal erythema present  No oropharyngeal exudate  Eyes:      General: No scleral icterus  Conjunctiva/sclera: Conjunctivae normal    Cardiovascular:      Rate and Rhythm: Normal rate and regular rhythm  Heart sounds: No murmur heard  Pulmonary:      Effort: Pulmonary effort is normal  No respiratory distress  Breath sounds: Normal breath sounds  Abdominal:      General: There is no distension  Palpations: Abdomen is soft  Tenderness: There is no abdominal tenderness  Musculoskeletal:         General: No deformity or signs of injury  Cervical back: Normal range of motion and neck supple  Skin:     General: Skin is warm and dry  Neurological:      General: No focal deficit present  Mental Status: He is alert  Cranial Nerves: No cranial nerve deficit  Sensory: No sensory deficit  Psychiatric:         Mood and Affect: Mood normal          Thought Content:  Thought content normal          Vital Signs  ED Triage Vitals [01/04/23 2220]   Temperature Pulse Respirations Blood Pressure SpO2   99 6 °F (37 6 °C) 75 22 (!) 174/95 100 %      Temp Source Heart Rate Source Patient Position - Orthostatic VS BP Location FiO2 (%)   Tympanic Monitor Sitting Right arm --      Pain Score       6           Vitals:    01/04/23 2220   BP: (!) 174/95   Pulse: 75   Patient Position - Orthostatic VS: Sitting         Visual Acuity      ED Medications  Medications   ibuprofen (MOTRIN) tablet 600 mg (600 mg Oral Given 1/5/23 0015)   acetaminophen (TYLENOL) tablet 975 mg (975 mg Oral Given 1/5/23 0015)       Diagnostic Studies  Results Reviewed     Procedure Component Value Units Date/Time    Strep A PCR [[id number]] Collected: 01/05/23 0006    Lab Status: In process Specimen: Throat Updated: 01/05/23 0013    COVID/FLU/RSV [[id number]] Collected: 01/05/23 0006    Lab Status: In process Specimen: Nares from Nose Updated: 01/05/23 0013                 No orders to display              Procedures  Procedures         ED Course                                             Medical Decision Making  Patient is a 61-year-old male seen in the emergency department with concern for cough, runny nose, sore throat over approximately the past 1-2 days  Patient was treated with medication for symptom control  Strep swab and COVID/influenza/RSV swab was ordered  Evaluation is consistent with upper respiratory infection, likely viral in etiology  Plan to have patient follow up with PCP  Patient stable for discharge home  Discharge instructions were reviewed with patient  Acute URI: acute illness or injury  Amount and/or Complexity of Data Reviewed  Labs: ordered  Risk  OTC drugs  Prescription drug management  Disposition  Final diagnoses:   Acute URI     Time reflects when diagnosis was documented in both MDM as applicable and the Disposition within this note     Time User Action Codes Description Comment    1/5/2023 12:09 AM Benny Larsen Add [J06 9] Acute URI       ED Disposition     ED Disposition   Discharge    Condition   Stable    Date/Time   Thu Jan 5, 2023 12:09 AM    Comment   Rayne Marshall discharge to home/self care                 Follow-up Information     Follow up With Specialties Details Why Contact Info    Izabel Gallardo MD Internal Medicine Call in 1 day  [place] [id number] [facility]  [phone]            Discharge Medication List as of 1/5/2023 12:10 AM      CONTINUE these medications which have NOT CHANGED    Details   amLODIPine (NORVASC) 5 mg tablet TAKE 1 TABLET BY MOUTH EVERY DAY, Normal      Blood Pressure Monitoring (Blood Pressure Kit) KIT Use daily Adult, Starting Tue 1/19/2021, Normal      !! Cholecalciferol (D3) 50 MCG (2000 UT) TABS Starting Mon 1/3/2022, Historical Med      !! Cholecalciferol (Vitamin D-3) 125 MCG (5000 UT) TABS Take 2,000 tablets by mouth daily  , Historical Med      CINNAMON PO Take by mouth daily, Historical Med      clobetasol (TEMOVATE) 0 05 % cream Apply topically 2 (two) times a day, Starting Tue 4/26/2022, Normal      cyclobenzaprine (FLEXERIL) 10 mg tablet Take 1 tablet (10 mg total) by mouth 2 (two) times a day as needed for muscle spasms for up to 5 days, Starting Thu 11/10/2022, Until Tue 11/15/2022 at 2359, Normal      Multiple Vitamin (multivitamin) capsule Take 1 capsule by mouth daily, Historical Med      naproxen (Naprosyn) 500 mg tablet Take 1 tablet (500 mg total) by mouth 2 (two) times a day as needed for mild pain for up to 10 days Take with food , Starting Thu 11/10/2022, Until Sun 11/20/2022 at 2359, Normal      Omega-3 Fatty Acids (Fish Oil) 1000 MG CPDR Take by mouth daily, Historical Med       !! - Potential duplicate medications found  Please discuss with provider  No discharge procedures on file      PDMP Review     None          ED Provider  Electronically Signed by           Maame Vázquez MD  01/05/23 4714

## 2023-01-05 NOTE — PATIENT INSTRUCTIONS
Patient was advised to continue present medications  discussed with the patient medications and laboratory data in detail  Follow-up with me as advised  If any blood test was ordered please do 1 week prior to next appointment unless advise to get earlier    If you have any questions please call the office [phone]

## 2023-01-05 NOTE — ASSESSMENT & PLAN NOTE
His last cholesterol test was 257 LDL was 183 HDL 56 triglyceride 92  Patient does not want any medication for his hyperlipidemia  Patient has been watching diet for cholesterol and saturated fat  Will follow lipid panel

## 2023-01-05 NOTE — ED NOTES
Pt seen, assessed and d/c by provider  Pt appeared to be in no acute distress upon discharge  Pt able to ambulate well without assistance upon exiting        Raymond Voss RN  01/05/23 9767

## 2023-01-05 NOTE — ASSESSMENT & PLAN NOTE
He has low back pain was seen by covering nurse practitioner  Was prescribed naproxen and muscle reduction  Somewhat better but gets on and off  Has pain mainly  the right side of the lower back and right groin area  Denies tingling numbness in the legs or radicular symptoms in the legs    He will consider seeing he is a chiropractor

## 2023-01-05 NOTE — ASSESSMENT & PLAN NOTE
Discussed with the patient about seeing nutrition/dietitian  We will make arrangement  Patient  was advised to decrease portion size  Advised to decrease carb, sugar, cholesterol intake  Advised to exercise 3-5 times per week  Advised to lose weight

## 2023-01-05 NOTE — ASSESSMENT & PLAN NOTE
He went to emergency room yesterday's strep test as well as COVID-19, influenza, RSV was negative  Patient states he has been taking over-the-counter cough medicine Mucinex but it is not effective  Has difficulty sleeping at night due to cough  Has  cough with yellow mucus and sore throat  Possible viral versus bacterial infection  Patient agrees to start antibiotic  Also will change cough medication to Promethazine DM  Advised to increase fluids

## 2023-01-05 NOTE — PROGRESS NOTES
Assessment/Plan:    1  Essential hypertension  Assessment & Plan:  Blood pressure well controlled  Advised to continue present medication  Advised for low-salt diet  2  Bronchitis  Assessment & Plan: He went to emergency room yesterday's strep test as well as COVID-19, influenza, RSV was negative  Patient states he has been taking over-the-counter cough medicine Mucinex but it is not effective  Has difficulty sleeping at night due to cough  Has  cough with yellow mucus and sore throat  Possible viral versus bacterial infection  Patient agrees to start antibiotic  Also will change cough medication to Promethazine DM  Advised to increase fluids  Orders:  -     azithromycin (Zithromax) 250 mg tablet; Take 2 tablets (500 mg total) by mouth daily for 1 day, THEN 1 tablet (250 mg total) daily for 4 days   -     Promethazine-DM (PHENERGAN-DM) 6 25-15 mg/5 mL oral syrup; Take 5 mL by mouth every 6 (six) hours as needed for cough    3  Hypercholesteremia  Assessment & Plan:  His last cholesterol test was 257 LDL was 183 HDL 56 triglyceride 92  Patient does not want any medication for his hyperlipidemia  Patient has been watching diet for cholesterol and saturated fat  Will follow lipid panel  4  Prediabetes  Assessment & Plan:  Last blood sugar 125 and hemoglobin A1c 5 9  Advised to watch diet for sugar and carbs intake  We will follow blood sugar hemoglobin A1c       5  BMI 37 0-37 9, adult  Assessment & Plan:  Discussed with the patient about seeing nutrition/dietitian  We will make arrangement  Patient  was advised to decrease portion size  Advised to decrease carb, sugar, cholesterol intake  Advised to exercise 3-5 times per week  Advised to lose weight  6  Acute right-sided low back pain, unspecified whether sciatica present  Assessment & Plan:  He has low back pain was seen by covering nurse practitioner  Was prescribed naproxen and muscle reduction    Somewhat better but gets on and off   Has pain mainly  the right side of the lower back and right groin area  Denies tingling numbness in the legs or radicular symptoms in the legs  He will consider seeing he is a chiropractor      7  Vitamin D deficiency  Assessment & Plan:  Patient states he has been taking vitamin D supplement  We will check vitamin D level  8  Elevated PSA  Assessment & Plan:  He has been seen and followed by urologist             Subjective: Patient presents with complaint of cough, sore throat and not feeling well  Patient ID: Abdoul Vega is a 63 y o  male  HPI   71-year-old male patient presents with complaint of cough, sore throat, not feeling well for last 3 days  He went to emergency room yesterday  He had strep test as well as test for COVID, influenza, RSV was negative  He was advised to take ibuprofen and cough medication he takes over-the-counter cough medication is not effective  Still has sore throat a cough with some yellow mucus  Has difficulty sleeping at night due to cough  Denies any chest pain or shortness of breath denies nausea vomiting diarrhea pain the abdomen  The following portions of the patient's history were reviewed and updated as appropriate:     Past Medical History:  He has a past medical history of BMI 36 0-36 9,adult (04/27/2021), BMI 37 0-37 9, adult (01/26/2021), Bradycardia, Bronchitis (1/5/2023), Elevated PSA (04/27/2021), Hypercholesteremia (01/23/2021), Hyperglycemia (01/23/2021), Hyperplastic polyp of sigmoid colon (04/27/2021), Hypertension, Low TSH level (01/23/2021), Other insomnia (04/27/2021), Prediabetes (10/26/2021), Skin rash (4/26/2022), and Weight gain (01/23/2021)  ,  _______________________________________________________________________  Past Surgical History:   has a past surgical history that includes Hernia repair (Bilateral); Hemorroidectomy; Shoulder surgery (Right);  Colonoscopy (2013); and [place] tooth extraction  ,  _______________________________________________________________________  Family History:  family history includes Hypertension in his father and mother; Other in his father; Prostate cancer in his family and father ,  _______________________________________________________________________  Social History:   reports that he has never smoked  He has never used smokeless tobacco  He reports current alcohol use  He reports that he does not use drugs  ,  _______________________________________________________________________  Allergies:  is allergic to penicillins     _______________________________________________________________________  Current Outpatient Medications   Medication Sig Dispense Refill   • amLODIPine (NORVASC) 5 mg tablet TAKE 1 TABLET BY MOUTH EVERY DAY 90 tablet 1   • azithromycin (Zithromax) 250 mg tablet Take 2 tablets (500 mg total) by mouth daily for 1 day, THEN 1 tablet (250 mg total) daily for 4 days  6 tablet 0   • Blood Pressure Monitoring (Blood Pressure Kit) KIT Use daily Adult 1 each 0   • Cholecalciferol (D3) 50 MCG (2000 UT) TABS      • CINNAMON PO Take by mouth daily     • clobetasol (TEMOVATE) 0 05 % cream Apply topically 2 (two) times a day 45 g 1   • Multiple Vitamin (multivitamin) capsule Take 1 capsule by mouth daily     • Omega-3 Fatty Acids (Fish Oil) 1000 MG CPDR Take by mouth daily     • Promethazine-DM (PHENERGAN-DM) 6 25-15 mg/5 mL oral syrup Take 5 mL by mouth every 6 (six) hours as needed for cough 120 mL 0   • Cholecalciferol (Vitamin D-3) 125 MCG (5000 UT) TABS Take 2,000 tablets by mouth daily         No current facility-administered medications for this visit      _______________________________________________________________________  Review of Systems   Constitutional: Negative for chills and fever  HENT: Positive for congestion and sore throat  Negative for ear pain, hearing loss, nosebleeds, sinus pain and trouble swallowing      Eyes: Negative for discharge, redness and visual disturbance  Respiratory: Positive for cough  Negative for chest tightness and shortness of breath  Cardiovascular: Negative for chest pain and palpitations  Gastrointestinal: Negative for abdominal pain, blood in stool, constipation, diarrhea, nausea and vomiting  Genitourinary: Negative for dysuria, flank pain, frequency and hematuria  Musculoskeletal: Positive for back pain ( Low back pain)  Negative for arthralgias, myalgias and neck pain  Skin: Negative for color change and rash  Neurological: Negative for dizziness, speech difficulty, weakness and headaches  Hematological: Does not bruise/bleed easily  Psychiatric/Behavioral: Negative for agitation and behavioral problems  Objective:  Vitals:    01/05/23 1124   BP: 130/80   BP Location: Left arm   Patient Position: Sitting   Cuff Size: Adult   Pulse: 82   Resp: 18   Temp: 98 8 °F (37 1 °C)   TempSrc: Tympanic   SpO2: 98%   Weight: 119 kg (263 lb)   Height: 5' 10" (1 778 m)     Body mass index is 37 74 kg/m²  Physical Exam  Vitals and nursing note reviewed  Constitutional:       General: He is not in acute distress  Appearance: Normal appearance  HENT:      Head: Normocephalic and atraumatic  Right Ear: Ear canal and external ear normal       Left Ear: Ear canal and external ear normal       Nose: Nose normal       Mouth/Throat:      Mouth: Mucous membranes are moist       Pharynx: Oropharynx is clear  No oropharyngeal exudate or posterior oropharyngeal erythema  Eyes:      General: No scleral icterus  Right eye: No discharge  Left eye: No discharge  Extraocular Movements: Extraocular movements intact  Conjunctiva/sclera: Conjunctivae normal    Cardiovascular:      Rate and Rhythm: Normal rate and regular rhythm  Pulses: Normal pulses  Heart sounds: No murmur heard  Pulmonary:      Effort: Pulmonary effort is normal  No respiratory distress  Breath sounds: Normal breath sounds  No wheezing, rhonchi or rales  Abdominal:      General: Bowel sounds are normal       Palpations: Abdomen is soft  Tenderness: There is no abdominal tenderness  Musculoskeletal:         General: Normal range of motion  Cervical back: Normal range of motion and neck supple  No muscular tenderness  Right lower leg: No edema  Left lower leg: No edema  Skin:     General: Skin is warm  Findings: No rash  Neurological:      General: No focal deficit present  Mental Status: He is alert and oriented to person, place, and time  Motor: No weakness  Coordination: Coordination normal    Psychiatric:         Mood and Affect: Mood normal          Behavior: Behavior normal           I spent 30 minutes with the patient today    More than 50% time spent for reviewing of external notes, reviewing of the results of diagnostics test, management of care, patient education and ordering of test

## 2023-01-05 NOTE — ASSESSMENT & PLAN NOTE
Last blood sugar 125 and hemoglobin A1c 5 9  Advised to watch diet for sugar and carbs intake  We will follow blood sugar hemoglobin A1c

## 2023-01-18 ENCOUNTER — TELEPHONE (OUTPATIENT)
Dept: OTHER | Facility: OTHER | Age: 58
End: 2023-01-18

## 2023-01-18 NOTE — TELEPHONE ENCOUNTER
Call from patient asking that all blood work that needs to be done for his upcoming appointment be mailed to him

## 2023-02-07 LAB — HBA1C MFR BLD HPLC: 6.2 %

## 2023-02-13 ENCOUNTER — OFFICE VISIT (OUTPATIENT)
Dept: ENDOCRINOLOGY | Facility: CLINIC | Age: 58
End: 2023-02-13

## 2023-02-13 VITALS
HEART RATE: 65 BPM | BODY MASS INDEX: 38.51 KG/M2 | DIASTOLIC BLOOD PRESSURE: 90 MMHG | SYSTOLIC BLOOD PRESSURE: 120 MMHG | WEIGHT: 269 LBS | HEIGHT: 70 IN

## 2023-02-13 DIAGNOSIS — E05.90 SUBCLINICAL HYPERTHYROIDISM: ICD-10-CM

## 2023-02-13 DIAGNOSIS — R73.03 PRE-DIABETES: ICD-10-CM

## 2023-02-13 DIAGNOSIS — E04.2 MULTIPLE THYROID NODULES: Primary | ICD-10-CM

## 2023-02-13 RX ORDER — MINOCYCLINE HYDROCHLORIDE 100 MG/1
100 CAPSULE ORAL DAILY
COMMUNITY
Start: 2023-01-30

## 2023-02-13 RX ORDER — IBUPROFEN 600 MG/1
600 TABLET ORAL EVERY 6 HOURS PRN
COMMUNITY
Start: 2023-01-30

## 2023-02-13 RX ORDER — PYRAZINAMIDE 500 MG/1
1 TABLET ORAL EVERY 6 HOURS PRN
COMMUNITY
Start: 2023-01-30

## 2023-02-13 NOTE — PROGRESS NOTES
Daylin Britton 63 y o  male MRN: [id number]    Encounter: [id number]      Assessment/Plan     1  Multiple thyroid nodules - stable  Dominant left sided N1 TR2 on my impression (mostly solid, isoechoic)  Yara Rivas already has instructions to repeat thyroid US 12-months from prior  Script for study previously provided  Will follow    2  Subclinical hyperthyroidism - stable on last labs  Historical eval showed negative anti-thyroidal antibodies, negative TSI  No history of thyroid uptake & scan  Will continue to monitor thyroid labs in 6-months    3  Prediabetes - stable  Continue to encourage active lifestyle, balanced and healthy nutrition  Problem List Items Addressed This Visit        Endocrine    Subclinical hyperthyroidism    Relevant Orders    T4, free Lab Collect    T3, free    TSH, 3rd generation Lab Collect    Multiple thyroid nodules - Primary   Other Visit Diagnoses     Pre-diabetes            RTC 6-months    CC: Subclinical hyperthyroidism, thyroid nodules    History of Present Illness     HPI:    Yara Rivas returns today for follow up of multiple thyroid nodules, history of subclinical hyperthyroidism  No acute concerns today  Yara Rivas denies any interval changes in health since last visit  He denies any compressive neck complaints  No family history of thyroid cancer  No history of XRT of head or neck, particularly during childhood or adolescence  His weight has been overall stable, although maybe some weight gain  No other hyper- or hypothyroid symptoms  Review of Systems   Constitutional: Negative for diaphoresis and unexpected weight change  HENT: Negative for trouble swallowing and voice change  Respiratory: Negative for shortness of breath  Cardiovascular: Negative for chest pain and palpitations  Gastrointestinal: Negative for nausea and vomiting  Endocrine: Negative for cold intolerance, heat intolerance, polydipsia and polyuria  Neurological: Negative for tremors  Psychiatric/Behavioral: Negative for agitation and behavioral problems  All other systems reviewed and are negative        Historical Information   Past Medical History:   Diagnosis Date   • BMI 36 0-36 9,adult 04/27/2021   • BMI 37 0-37 9, adult 01/26/2021   • Bradycardia    • Bronchitis 1/5/2023   • Elevated PSA 04/27/2021   • Hypercholesteremia 01/23/2021   • Hyperglycemia 01/23/2021   • Hyperplastic polyp of sigmoid colon 04/27/2021   • Hypertension    • Low TSH level 01/23/2021   • Other insomnia 04/27/2021   • Prediabetes 10/26/2021   • Skin rash 4/26/2022   • Weight gain 01/23/2021     Past Surgical History:   Procedure Laterality Date   • COLONOSCOPY  2013    [place], [place]    • HEMORROIDECTOMY      [id number]    • HERNIA REPAIR Bilateral     In childhood    • SHOULDER SURGERY Right     Pain - impingement   • WISDOM TOOTH EXTRACTION       Social History   Social History     Substance and Sexual Activity   Alcohol Use Yes    Comment: Occasional      Social History     Substance and Sexual Activity   Drug Use Never    Comment: No dug use - As per [employer]     Social History     Tobacco Use   Smoking Status Never   Smokeless Tobacco Never     Family History:   Family History   Problem Relation Age of Onset   • Hypertension Mother    • Other Father         pacemaker    • Hypertension Father    • Prostate cancer Father         diagnosed in his 62s    • Prostate cancer Family        Meds/Allergies   Current Outpatient Medications   Medication Sig Dispense Refill   • amLODIPine (NORVASC) 5 mg tablet TAKE 1 TABLET BY MOUTH EVERY DAY 90 tablet 1   • Blood Pressure Monitoring (Blood Pressure Kit) KIT Use daily Adult 1 each 0   • Cholecalciferol (D3) 50 MCG (2000 UT) TABS      • CINNAMON PO Take by mouth daily     • ibuprofen (MOTRIN) 600 mg tablet Take 600 mg by mouth every 6 (six) hours as needed     • minocycline (MINOCIN) 100 mg capsule Take 100 mg by mouth daily     • Multiple Vitamin (multivitamin) capsule Take 1 capsule by mouth daily     • Omega-3 Fatty Acids (Fish Oil) 1000 MG CPDR Take by mouth daily     • acetaminophen-codeine (TYLENOL with CODEINE #3) 300-30 MG per tablet Take 1 tablet by mouth every 6 (six) hours as needed (Patient not taking: Reported on 2/13/2023)     • Cholecalciferol (Vitamin D-3) 125 MCG (5000 UT) TABS Take 2,000 tablets by mouth daily       • clobetasol (TEMOVATE) 0 05 % cream Apply topically 2 (two) times a day 45 g 1   • Promethazine-DM (PHENERGAN-DM) 6 25-15 mg/5 mL oral syrup Take 5 mL by mouth every 6 (six) hours as needed for cough 120 mL 0     No current facility-administered medications for this visit  Allergies   Allergen Reactions   • Penicillins Swelling       Objective   Vitals: Blood pressure 120/90, pulse 65, height 5' 10" (1 778 m), weight 122 kg (269 lb)  Physical Exam  Vitals reviewed  Constitutional:       Appearance: Normal appearance  He is not ill-appearing  HENT:      Head: Normocephalic and atraumatic  Nose: Nose normal    Eyes:      General: No scleral icterus  Conjunctiva/sclera: Conjunctivae normal       Comments: No exophthalmos   Neck:      Thyroid: Thyroid mass (palpable isthmus nodule) present  No thyromegaly or thyroid tenderness  Cardiovascular:      Rate and Rhythm: Normal rate and regular rhythm  Pulses: Normal pulses  Pulmonary:      Effort: Pulmonary effort is normal  No respiratory distress  Abdominal:      Palpations: Abdomen is soft  Tenderness: There is no abdominal tenderness  Lymphadenopathy:      Cervical: No cervical adenopathy  Skin:     General: Skin is warm and dry  Neurological:      General: No focal deficit present  Mental Status: He is alert  Comments: No tremor to outstretched hands   Psychiatric:         Mood and Affect: Mood normal          Behavior: Behavior normal          The history was obtained from the review of the chart, patient      Lab Results:             Imaging Studies: Results for orders placed during the hospital encounter of 09/12/22    US thyroid    Impression  Stable multiple benign spongiform nodules bilaterally (TR 1)  No nodule which meets current ACR criteria for biopsy or followup ultrasounds  Reference: ACR Thyroid Imaging, Reporting and Data System (TI-RADS): White Paper of the [employer] [facility]  J AM Ramiro Radiol 2439;16:226-018  (additional recommendations based on American Thyroid Association 2015 guidelines )      Workstation performed: [id number]    9 12 2022    THYROID ULTRASOUND     INDICATION:    E05 90: Thyrotoxicosis, unspecified without thyrotoxic crisis or storm      COMPARISON:  August 30, 2021      TECHNIQUE:   Ultrasound of the thyroid was performed with a high frequency linear transducer in transverse and sagittal planes including volumetric imaging sweeps as well as traditional still imaging technique      FINDINGS:  Normal homogeneous smooth echotexture      Right lobe: 6 6 x 1 9 x 3 2 cm  Volume 19 0 mL  Left lobe:  6 4 x 2 5 x 2 9 cm  Volume 22 3 mL  Isthmus: 0 6  cm      There are multiple spongiform nodules bilaterally, unchanged in size from prior exam   Previously described "solid" TR 3 nodules are in fact spongiform nodules (better evaluated on cine clips)  A representative spongiform nodule is described below      Nodule #1  Image 31  Isthmic nodule measuring 1 7 x 1 2 x 1 8 cm  Unchanged from prior  COMPOSITION:  0 points, spongiform  ECHOGENICITY:  Not applicable when spongiform composition  SHAPE:  Not applicable when spongiform composition  MARGIN: Not applicable when spongiform composition  ECHOGENIC FOCI:  Not applicable when spongiform composition  TI-RADS Classification: TR 1 (0 points), Benign  No FNA      No nodules which meet sonographic criteria for biopsy or follow-up ultrasounds      IMPRESSION:     Stable multiple benign spongiform nodules bilaterally (TR 1)   No nodule which meets current ACR criteria for biopsy or followup ultrasounds  I have personally reviewed pertinent reports  and I have personally reviewed pertinent films in PACS  Left N1 mostly solid, some cystic components, isoechoic  I would assign a TIRADS designation of TR2 to this nodule  Portions of the record may have been created with voice recognition software  Occasional wrong word or "sound a like" substitutions may have occurred due to the inherent limitations of voice recognition software  Read the chart carefully and recognize, using context, where substitutions have occurred

## 2023-02-20 ENCOUNTER — TELEPHONE (OUTPATIENT)
Dept: UROLOGY | Facility: AMBULATORY SURGERY CENTER | Age: 58
End: 2023-02-20

## 2023-02-20 ENCOUNTER — OFFICE VISIT (OUTPATIENT)
Dept: UROLOGY | Facility: CLINIC | Age: 58
End: 2023-02-20

## 2023-02-20 VITALS
BODY MASS INDEX: 37.51 KG/M2 | SYSTOLIC BLOOD PRESSURE: 126 MMHG | WEIGHT: 262 LBS | HEIGHT: 70 IN | DIASTOLIC BLOOD PRESSURE: 88 MMHG

## 2023-02-20 DIAGNOSIS — R97.20 ELEVATED PSA: Primary | ICD-10-CM

## 2023-02-20 NOTE — TELEPHONE ENCOUNTER
Pt under care of Shakir    Pt called and left VM requesting labs to be mailed to him    Pt call [phone]

## 2023-02-20 NOTE — PROGRESS NOTES
UROLOGY PROGRESS NOTE   Patient Identifiers: Juan Diez (MRN [id number])  Date of Service: 2/20/2023    Subjective:   50-year-old -American man with history of elevated PSA  Current PSA 3 62  Multiparametric MRI showing PI-RADS 2  Prostate size 65 cc  Denies any family history of prostate cancer  His father and uncle both had prostate cancer  Previous PSA in September was over 5  Reason for visit: Elevated PSA follow-up    Objective:     VITALS:    There were no vitals filed for this visit    AUA SYMPTOM SCORE    Flowsheet Row Most Recent Value   AUA SYMPTOM SCORE    How often have you had a sensation of not emptying your bladder completely after you finished urinating? 0 (P)     How often have you had to urinate again less than two hours after you finished urinating? 0 (P)     How often have you found you stopped and started again several times when you urinate? 0 (P)     How often have you found it difficult to postpone urination? 0 (P)     How often have you had a weak urinary stream? 0 (P)     How often have you had to push or strain to begin urination? 0 (P)     How many times did you most typically get up to urinate from the time you went to bed at night until the time you got up in the morning? 1 (P)     Quality of Life: If you were to spend the rest of your life with your urinary condition just the way it is now, how would you feel about that? 0 (P)     AUA SYMPTOM SCORE 1 (P)             LABS:  Lab Results   Component Value Date    HGB 15 1 05/04/2020    HCT 46 2 05/04/2020    WBC 6 16 05/04/2020     05/04/2020   ]    Lab Results   Component Value Date    K 3 8 05/04/2020     05/04/2020    CO2 29 05/04/2020    BUN 9 05/04/2020    CREATININE 1 11 05/04/2020    CALCIUM 8 9 05/04/2020   ]        INPATIENT MEDS:    Current Outpatient Medications:   •  acetaminophen-codeine (TYLENOL with CODEINE #3) 300-30 MG per tablet, Take 1 tablet by mouth every 6 (six) hours as needed (Patient not taking: Reported on 2/13/2023), Disp: , Rfl:   •  amLODIPine (NORVASC) 5 mg tablet, TAKE 1 TABLET BY MOUTH EVERY DAY, Disp: 90 tablet, Rfl: 1  •  Blood Pressure Monitoring (Blood Pressure Kit) KIT, Use daily Adult, Disp: 1 each, Rfl: 0  •  Cholecalciferol (D3) 50 MCG (2000 UT) TABS, , Disp: , Rfl:   •  Cholecalciferol (Vitamin D-3) 125 MCG (5000 UT) TABS, Take 2,000 tablets by mouth daily  , Disp: , Rfl:   •  CINNAMON PO, Take by mouth daily, Disp: , Rfl:   •  clobetasol (TEMOVATE) 0 05 % cream, Apply topically 2 (two) times a day, Disp: 45 g, Rfl: 1  •  ibuprofen (MOTRIN) 600 mg tablet, Take 600 mg by mouth every 6 (six) hours as needed, Disp: , Rfl:   •  minocycline (MINOCIN) 100 mg capsule, Take 100 mg by mouth daily, Disp: , Rfl:   •  Multiple Vitamin (multivitamin) capsule, Take 1 capsule by mouth daily, Disp: , Rfl:   •  Omega-3 Fatty Acids (Fish Oil) 1000 MG CPDR, Take by mouth daily, Disp: , Rfl:   •  Promethazine-DM (PHENERGAN-DM) 6 25-15 mg/5 mL oral syrup, Take 5 mL by mouth every 6 (six) hours as needed for cough, Disp: 120 mL, Rfl: 0      Physical Exam:   There were no vitals taken for this visit  GEN: no acute distress    RESP: breathing comfortably with no accessory muscle use    ABD: soft, non-tender, non-distended   INCISION:    EXT: no significant peripheral edema   (Male): Penis circumcised, phallus normal, meatus patent  Testicles descended into scrotum bilaterally without masses nor tenderness  No inguinal hernias bilaterally  DEV: Prostate is difficult to palpate     RADIOLOGY:   MULTIPARAMETRIC MRI OF THE PROSTATE WITH AND WITHOUT CONTRAST-WITH 3-D POSTPROCESSING   IMPRESSION:     1  PI-RADSv2 1 Category 2 - Low (clinically significant cancer is unlikely to be present)  2  No extraprostatic tumor, seminal vesicle invasion, pelvic lymphadenopathy, or pelvic osseous metastatic disease  3  Calculated prostate volume of 65 1 cc  Assessment:   #1  Elevated PSA  #2  Family history of prostate cancer    Plan:   -Follow-up in 6 months with PSA prior to visit  -Sexual activity before the blood test  -  -

## 2023-02-27 ENCOUNTER — OFFICE VISIT (OUTPATIENT)
Dept: INTERNAL MEDICINE CLINIC | Facility: CLINIC | Age: 58
End: 2023-02-27

## 2023-02-27 VITALS
DIASTOLIC BLOOD PRESSURE: 80 MMHG | TEMPERATURE: 98.2 F | OXYGEN SATURATION: 100 % | HEART RATE: 64 BPM | WEIGHT: 260 LBS | HEIGHT: 70 IN | BODY MASS INDEX: 37.22 KG/M2 | RESPIRATION RATE: 16 BRPM | SYSTOLIC BLOOD PRESSURE: 128 MMHG

## 2023-02-27 DIAGNOSIS — K63.5 HYPERPLASTIC POLYP OF SIGMOID COLON: ICD-10-CM

## 2023-02-27 DIAGNOSIS — R73.03 PREDIABETES: ICD-10-CM

## 2023-02-27 DIAGNOSIS — R97.20 ELEVATED PSA: ICD-10-CM

## 2023-02-27 DIAGNOSIS — E78.00 HYPERCHOLESTEREMIA: ICD-10-CM

## 2023-02-27 DIAGNOSIS — E04.2 MULTIPLE THYROID NODULES: ICD-10-CM

## 2023-02-27 DIAGNOSIS — E55.9 VITAMIN D DEFICIENCY: ICD-10-CM

## 2023-02-27 DIAGNOSIS — Z00.00 ANNUAL PHYSICAL EXAM: Primary | ICD-10-CM

## 2023-02-27 DIAGNOSIS — I10 ESSENTIAL HYPERTENSION: ICD-10-CM

## 2023-02-27 PROBLEM — M25.512 ACUTE PAIN OF LEFT SHOULDER: Status: ACTIVE | Noted: 2023-02-27

## 2023-02-27 NOTE — ASSESSMENT & PLAN NOTE
Cholesterol 257, triglyceride 135, HDL 52,  discussed with the patient about starting medication as it is not controlled  Patient does not want any medication for high cholesterol  Discussed with the patient consequences of uncontrolled hypercholesterolemia  Discussed with the patient about low-cholesterol diet and low carbs diet  Will refer to nutrition therapy

## 2023-02-27 NOTE — PATIENT INSTRUCTIONS
Wellness Visit for Adults   AMBULATORY CARE:   A wellness visit  is when you see your healthcare provider to get screened for health problems  Your healthcare provider will also give you advice on how to stay healthy  Write down your questions so you remember to ask them  Ask your healthcare provider how often you should have a wellness visit  What happens at a wellness visit:  Your healthcare provider will ask about your health, and your family history of health problems  This includes high blood pressure, heart disease, and cancer  He or she will ask if you have symptoms that concern you, if you smoke, and about your mood  You may also be asked about your intake of medicines, supplements, food, and alcohol  Any of the following may be done: Your weight  will be checked  Your height may also be checked so your body mass index (BMI) can be calculated  Your BMI shows if you are at a healthy weight  Your blood pressure  and heart rate will be checked  Your temperature may also be checked  Blood and urine tests  may be done  Blood tests may be done to check your cholesterol levels  Abnormal cholesterol levels increase your risk for heart disease and stroke  You may also need a blood or urine test to check for diabetes if you are at increased risk  Urine tests may be done to look for signs of an infection or kidney disease  A physical exam  includes checking your heartbeat and lungs with a stethoscope  Your healthcare provider may also check your skin to look for sun damage  Screening tests  may be recommended  A screening test is done to check for diseases that may not cause symptoms  The screening tests you may need depend on your age, gender, family history, and lifestyle habits  For example, colorectal screening may be recommended if you are 48years old or older  Screening tests you need if you are a woman:   A Pap smear  is used to screen for cervical cancer   Pap smears are usually done every 3 to 5 years depending on your age  You may need them more often if you have had abnormal Pap smear test results in the past  Ask your healthcare provider how often you should have a Pap smear  A mammogram  is an x-ray of your breasts to screen for breast cancer  Experts recommend mammograms every 2 years starting at age 48 years  You may need a mammogram at age 52 years or younger if you have an increased risk for breast cancer  Talk to your healthcare provider about when you should start having mammograms and how often you need them  Vaccines you may need:   Get an influenza vaccine  every year  The influenza vaccine protects you from the flu  Several types of viruses cause the flu  The viruses change over time, so new vaccines are made each year  Get a tetanus-diphtheria (Td) booster vaccine  every 10 years  This vaccine protects you against tetanus and diphtheria  Tetanus is a severe infection that may cause painful muscle spasms and lockjaw  Diphtheria is a severe bacterial infection that causes a thick covering in the back of your mouth and throat  Get a human papillomavirus (HPV) vaccine  if you are female and aged 23 to 32 or male 23 to 24 and never received it  This vaccine protects you from HPV infection  HPV is the most common infection spread by sexual contact  HPV may also cause vaginal, penile, and anal cancers  Get a pneumococcal vaccine  if you are aged 72 years or older  The pneumococcal vaccine is an injection given to protect you from pneumococcal disease  Pneumococcal disease is an infection caused by pneumococcal bacteria  The infection may cause pneumonia, meningitis, or an ear infection  Get a shingles vaccine  if you are 60 or older, even if you have had shingles before  The shingles vaccine is an injection to protect you from the varicella-zoster virus  This is the same virus that causes chickenpox   Shingles is a painful rash that develops in people who had chickenpox or have been exposed to the virus  How to eat healthy:  My Plate is a model for planning healthy meals  It shows the types and amounts of foods that should go on your plate  Fruits and vegetables make up about half of your plate, and grains and protein make up the other half  A serving of dairy is included on the side of your plate  The amount of calories and serving sizes you need depends on your age, gender, weight, and height  Examples of healthy foods are listed below:  Eat a variety of vegetables  such as dark green, red, and orange vegetables  You can also include canned vegetables low in sodium (salt) and frozen vegetables without added butter or sauces  Eat a variety of fresh fruits , canned fruit in 100% juice, frozen fruit, and dried fruit  Include whole grains  At least half of the grains you eat should be whole grains  Examples include whole-wheat bread, wheat pasta, brown rice, and whole-grain cereals such as oatmeal     Eat a variety of protein foods such as seafood (fish and shellfish), lean meat, and poultry without skin (turkey and chicken)  Examples of lean meats include pork leg, shoulder, or tenderloin, and beef round, sirloin, tenderloin, and extra lean ground beef  Other protein foods include eggs and egg substitutes, beans, peas, soy products, nuts, and seeds  Choose low-fat dairy products such as skim or 1% milk or low-fat yogurt, cheese, and cottage cheese  Limit unhealthy fats  such as butter, hard margarine, and shortening  Exercise:  Exercise at least 30 minutes per day on most days of the week  Some examples of exercise include walking, biking, dancing, and swimming  You can also fit in more physical activity by taking the stairs instead of the elevator or parking farther away from stores  Include muscle strengthening activities 2 days each week  Regular exercise provides many health benefits   It helps you manage your weight, and decreases your risk for type 2 diabetes, heart disease, stroke, and high blood pressure  Exercise can also help improve your mood  Ask your healthcare provider about the best exercise plan for you  General health and safety guidelines:   Do not smoke  Nicotine and other chemicals in cigarettes and cigars can cause lung damage  Ask your healthcare provider for information if you currently smoke and need help to quit  E-cigarettes or smokeless tobacco still contain nicotine  Talk to your healthcare provider before you use these products  Limit alcohol  A drink of alcohol is 12 ounces of beer, 5 ounces of wine, or 1½ ounces of liquor  Lose weight, if needed  Being overweight increases your risk of certain health conditions  These include heart disease, high blood pressure, type 2 diabetes, and certain types of cancer  Protect your skin  Do not sunbathe or use tanning beds  Use sunscreen with a SPF 15 or higher  Apply sunscreen at least 15 minutes before you go outside  Reapply sunscreen every 2 hours  Wear protective clothing, hats, and sunglasses when you are outside  Drive safely  Always wear your seatbelt  Make sure everyone in your car wears a seatbelt  A seatbelt can save your life if you are in an accident  Do not use your cell phone when you are driving  This could distract you and cause an accident  Pull over if you need to make a call or send a text message  Practice safe sex  Use latex condoms if are sexually active and have more than one partner  Your healthcare provider may recommend screening tests for sexually transmitted infections (STIs)  Wear helmets, lifejackets, and protective gear  Always wear a helmet when you ride a bike or motorcycle, go skiing, or play sports that could cause a head injury  Wear protective equipment when you play sports  Wear a lifejacket when you are on a boat or doing water sports      © Copyright Zara Harder 2022 Information is for End User's use only and may not be sold, redistributed or otherwise used for commercial purposes  The above information is an  only  It is not intended as medical advice for individual conditions or treatments  Talk to your doctor, nurse or pharmacist before following any medical regimen to see if it is safe and effective for you  Patient was advised to continue present medications  discussed with the patient medications and laboratory data in detail  Follow-up with me in 3 months or as advised  If any blood test was ordered please do 1 week prior to next appointment unless advise to get earlier    If you have any questions please call the office [phone]

## 2023-02-27 NOTE — PROGRESS NOTES
[facility]    NAME: Varun Chao  AGE: 63 y o  SEX: male  : 1965     DATE: 2023     Assessment and Plan:     Problem List Items Addressed This Visit    None  Visit Diagnoses     Annual physical exam    -  Primary          Immunizations and preventive care screenings were discussed with patient today  Appropriate education was printed on patient's after visit summary  Discussed risks and benefits of prostate cancer screening  We discussed the controversial history of PSA screening for prostate cancer in the [place] as well as the risk of over detection and over treatment of prostate cancer by way of PSA screening  The patient understands that PSA blood testing is an imperfect way to screen for prostate cancer and that elevated PSA levels in the blood may also be caused by infection, inflammation, prostatic trauma or manipulation, urological procedures, or by benign prostatic enlargement  The role of the digital rectal examination in prostate cancer screening was also discussed and I discussed with him that there is large interobserver variability in the findings of digital rectal examination  Counseling:  · Alcohol/drug use: discussed moderation in alcohol intake, the recommendations for healthy alcohol use, and avoidance of illicit drug use  No follow-ups on file  Chief Complaint:     Chief Complaint   Patient presents with   • Annual Exam      History of Present Illness:     Adult Annual Physical   Patient here for a comprehensive physical exam  The patient reports problems - left shoulder pain  Diet and Physical Activity  · Diet/Nutrition: heart healthy (low sodium) diet  · Exercise: walking        Depression Screening  PHQ-2/9 Depression Screening    Little interest or pleasure in doing things: 0 - not at all  Feeling down, depressed, or hopeless: 0 - not at all       General Health  · Sleep: sleeps well  · Hearing: normal - bilateral   · Vision: no vision problems  · Dental: regular dental visits   Health  · Symptoms include: none     Review of Systems:     Review of Systems   Constitutional: Negative for chills and fever  HENT: Negative for congestion, ear pain, hearing loss, nosebleeds, sinus pain, sore throat and trouble swallowing  Eyes: Negative for discharge, redness and visual disturbance  Respiratory: Negative for cough, chest tightness and shortness of breath  Cardiovascular: Negative for chest pain and palpitations  Gastrointestinal: Negative for abdominal pain, blood in stool, constipation, diarrhea, nausea and vomiting  Genitourinary: Negative for dysuria, flank pain, frequency and hematuria  Musculoskeletal: Positive for arthralgias ( left shoulder pain)  Negative for myalgias and neck pain  Skin: Negative for color change and rash  Neurological: Negative for dizziness, speech difficulty, weakness and headaches  Hematological: Does not bruise/bleed easily  Psychiatric/Behavioral: Negative for agitation and behavioral problems           Past Medical History:     Past Medical History:   Diagnosis Date   • BMI 36 0-36 9,adult 04/27/2021   • BMI 37 0-37 9, adult 01/26/2021   • Bradycardia    • Bronchitis 1/5/2023   • Elevated PSA 04/27/2021   • Hypercholesteremia 01/23/2021   • Hyperglycemia 01/23/2021   • Hyperplastic polyp of sigmoid colon 04/27/2021   • Hypertension    • Low TSH level 01/23/2021   • Other insomnia 04/27/2021   • Prediabetes 10/26/2021   • Skin rash 4/26/2022   • Weight gain 01/23/2021      Past Surgical History:     Past Surgical History:   Procedure Laterality Date   • COLONOSCOPY  2013    [address]    • HEMORROIDECTOMY      [id number]    • HERNIA REPAIR Bilateral     In childhood    • SHOULDER SURGERY Right     Pain - impingement   • WISDOM TOOTH EXTRACTION        Family History:     Family History   Problem Relation Age of Onset   • Hypertension Mother    • Other Father         pacemaker    • Hypertension Father    • Prostate cancer Father         diagnosed in his 62s    • Prostate cancer Family       Social History:     Social History     Socioeconomic History   • Marital status: Single     Spouse name: None   • Number of children: None   • Years of education: None   • Highest education level: None   Occupational History   • None   Tobacco Use   • Smoking status: Never   • Smokeless tobacco: Never   Vaping Use   • Vaping Use: Never used   Substance and Sexual Activity   • Alcohol use: Yes     Comment: Occasional    • Drug use: Never     Comment: No dug use - As per [employer]   • Sexual activity: Yes   Other Topics Concern   • None   Social History Narrative    Works with [employer] for [employer] for [employer]able    Lives with his mom        Sees urology in [place]; last seen 2013 in [place], [place]    - As per [address] [place] [address]     Financial Resource Strain: Not on file   Food Insecurity: Not on file   Transportation Needs: Not on file   Physical Activity: Not on file   Stress: Not on file   Social Connections: Not on file   Intimate Partner Violence: Not on file   Housing Stability: Not on file      Current Medications:     Current Outpatient Medications   Medication Sig Dispense Refill   • amLODIPine (NORVASC) 5 mg tablet TAKE 1 TABLET BY MOUTH EVERY DAY 90 tablet 1   • Blood Pressure Monitoring (Blood Pressure Kit) KIT Use daily Adult 1 each 0   • Cholecalciferol (D3) 50 MCG (2000 UT) TABS      • CINNAMON PO Take by mouth daily     • Multiple Vitamin (multivitamin) capsule Take 1 capsule by mouth daily       No current facility-administered medications for this visit  Allergies:      Allergies   Allergen Reactions   • Penicillins Swelling      Physical Exam:     /80 (BP Location: Right arm, Patient Position: Sitting, Cuff Size: Adult)   Pulse 72   Temp 98 2 °F (36 8 °C) (Tympanic) Resp 16   Ht 5' 10" (1 778 m)   Wt 118 kg (260 lb)   SpO2 100%   BMI 37 31 kg/m²     Physical Exam  Vitals and nursing note reviewed  Constitutional:       General: He is not in acute distress  Appearance: He is well-developed  He is obese  HENT:      Head: Normocephalic and atraumatic  Right Ear: Tympanic membrane, ear canal and external ear normal  There is no impacted cerumen  Left Ear: Tympanic membrane, ear canal and external ear normal  There is no impacted cerumen  Nose: Nose normal       Mouth/Throat:      Mouth: Mucous membranes are moist    Eyes:      General: No scleral icterus  Right eye: No discharge  Left eye: No discharge  Extraocular Movements: Extraocular movements intact  Conjunctiva/sclera: Conjunctivae normal       Pupils: Pupils are equal, round, and reactive to light  Cardiovascular:      Rate and Rhythm: Normal rate and regular rhythm  Pulses: Normal pulses  Heart sounds: Normal heart sounds  No murmur heard  Pulmonary:      Effort: Pulmonary effort is normal  No respiratory distress  Breath sounds: Normal breath sounds  No wheezing, rhonchi or rales  Abdominal:      General: Bowel sounds are normal  There is no distension  Palpations: Abdomen is soft  Tenderness: There is no abdominal tenderness  Musculoskeletal:         General: Tenderness ( left shoulder pain when he lifts up) present  No swelling  Normal range of motion  Cervical back: Normal range of motion and neck supple  Right lower leg: No edema  Left lower leg: No edema  Skin:     General: Skin is warm and dry  Capillary Refill: Capillary refill takes less than 2 seconds  Neurological:      General: No focal deficit present  Mental Status: He is alert and oriented to person, place, and time  Motor: No weakness        Coordination: Coordination normal       Gait: Gait normal    Psychiatric:         Mood and Affect: Mood normal          Behavior: Behavior normal             Ab Gilbert MD  [facility]

## 2023-02-27 NOTE — ASSESSMENT & PLAN NOTE
Fasting blood sugar 119 hemoglobin A1c 6 2  Advised for low sugar low carbs diet and lose weight  Will refer to nutrition therapy

## 2023-02-27 NOTE — PROGRESS NOTES
Assessment/Plan:    1  Annual physical exam    2  Essential hypertension  Assessment & Plan:  Blood pressure well controlled  Advised to continue present medication  Advised for low-salt diet  3  Hypercholesteremia  Assessment & Plan:  Cholesterol 257, triglyceride 135, HDL 52,  discussed with the patient about starting medication as it is not controlled  Patient does not want any medication for high cholesterol  Discussed with the patient consequences of uncontrolled hypercholesterolemia  Discussed with the patient about low-cholesterol diet and low carbs diet  Will refer to nutrition therapy  Orders:  -     Ambulatory Referral to Nutrition Services; Future    4  Multiple thyroid nodules  Assessment & Plan:  He has been seen and followed by endocrinologist   Will be going for blood test as well as ultrasound yearly as advised by endocrinologist       5  Hyperplastic polyp of sigmoid colon  Assessment & Plan:  He had a colonoscopy January 2022 who was advised follow-up after 10 years  6  Prediabetes  Assessment & Plan:  Fasting blood sugar 119 hemoglobin A1c 6 2  Advised for low sugar low carbs diet and lose weight  Will refer to nutrition therapy  Orders:  -     Ambulatory Referral to Nutrition Services; Future    7  Vitamin D deficiency  Assessment & Plan:  Vitamin D deficient after being on vitamin D supplement vitamin D level 63 advised to continue same dose of vitamin D       8  BMI 37 0-37 9, adult  Assessment & Plan:  Patient  was advised to decrease portion size  Advised to decrease carb, sugar, cholesterol intake  Advised to exercise 3-5 times per week  Advised to lose weight  Orders:  -     Ambulatory Referral to Nutrition Services; Future    9  Elevated PSA  Assessment & Plan:  He has been seen and followed by urologist   Last TSH was within normal limit  Subjective: Patient presents for annual wellness exam as well as follow-up his medical problems  Patient ID: Glory Albarran is a 63 y o  male  HPI   46-year-old male patient presents for annual wellness exam as well as follow-up his medical problems  He denies chest pain, shortness of breath, pain in abdomen  Denies any cough, fever, chills  Denies nausea vomiting diarrhea  He was seen by an endocrinologist as well as urologist   He is having a left shoulder pain for which he has been followed by an orthopedic specialist   He had MRI of the left shoulder yesterday will follow with specialist     The following portions of the patient's history were reviewed and updated as appropriate:     Past Medical History:  He has a past medical history of Acute pain of left shoulder (2/27/2023), BMI 36 0-36 9,adult (04/27/2021), BMI 37 0-37 9, adult (01/26/2021), Bradycardia, Bronchitis (1/5/2023), Elevated PSA (04/27/2021), Hypercholesteremia (01/23/2021), Hyperglycemia (01/23/2021), Hyperplastic polyp of sigmoid colon (04/27/2021), Hypertension, Low TSH level (01/23/2021), Other insomnia (04/27/2021), Prediabetes (10/26/2021), Skin rash (4/26/2022), and Weight gain (01/23/2021)  ,  _______________________________________________________________________  Past Surgical History:   has a past surgical history that includes Hernia repair (Bilateral); Hemorroidectomy; Shoulder surgery (Right); Colonoscopy (2013); and [place] tooth extraction  ,  _______________________________________________________________________  Family History:  family history includes Hypertension in his father and mother; Other in his father; Prostate cancer in his family and father ,  _______________________________________________________________________  Social History:   reports that he has never smoked  He has never used smokeless tobacco  He reports current alcohol use  He reports that he does not use drugs  ,  _______________________________________________________________________  Allergies:  is allergic to penicillins     _______________________________________________________________________  Current Outpatient Medications   Medication Sig Dispense Refill   • amLODIPine (NORVASC) 5 mg tablet TAKE 1 TABLET BY MOUTH EVERY DAY 90 tablet 1   • Blood Pressure Monitoring (Blood Pressure Kit) KIT Use daily Adult 1 each 0   • Cholecalciferol (D3) 50 MCG (2000 UT) TABS      • CINNAMON PO Take by mouth daily     • Multiple Vitamin (multivitamin) capsule Take 1 capsule by mouth daily       No current facility-administered medications for this visit      _______________________________________________________________________  Review of Systems   Constitutional: Negative for chills and fever  HENT: Negative for congestion, ear pain, hearing loss, nosebleeds, sinus pain, sore throat and trouble swallowing  Eyes: Negative for discharge, redness and visual disturbance  Respiratory: Negative for cough, chest tightness and shortness of breath  Cardiovascular: Negative for chest pain and palpitations  Gastrointestinal: Negative for abdominal pain, blood in stool, constipation, diarrhea, nausea and vomiting  Genitourinary: Negative for dysuria, flank pain, frequency and hematuria  Musculoskeletal: Negative for arthralgias, myalgias and neck pain  Skin: Negative for color change and rash  Neurological: Negative for dizziness, speech difficulty, weakness and headaches  Hematological: Does not bruise/bleed easily  Psychiatric/Behavioral: Negative for agitation and behavioral problems  Objective:  Vitals:    02/27/23 0921   BP: 128/80   BP Location: Right arm   Patient Position: Sitting   Cuff Size: Adult   Pulse: 64   Resp: 16   Temp: 98 2 °F (36 8 °C)   TempSrc: Tympanic   SpO2: 100%   Weight: 118 kg (260 lb)   Height: 5' 10" (1 778 m)     Body mass index is 37 31 kg/m²  Physical Exam  Vitals and nursing note reviewed  Constitutional:       General: He is not in acute distress       Appearance: He is obese    HENT:      Head: Normocephalic and atraumatic  Right Ear: Tympanic membrane, ear canal and external ear normal  There is no impacted cerumen  Left Ear: Tympanic membrane, ear canal and external ear normal  There is no impacted cerumen  Nose: Nose normal       Mouth/Throat:      Mouth: Mucous membranes are moist       Pharynx: Oropharynx is clear  Eyes:      General: No scleral icterus  Right eye: No discharge  Left eye: No discharge  Extraocular Movements: Extraocular movements intact  Conjunctiva/sclera: Conjunctivae normal       Pupils: Pupils are equal, round, and reactive to light  Cardiovascular:      Rate and Rhythm: Normal rate and regular rhythm  Pulses: Normal pulses  Heart sounds: No murmur heard  Pulmonary:      Effort: Pulmonary effort is normal  No respiratory distress  Breath sounds: Normal breath sounds  No wheezing, rhonchi or rales  Abdominal:      General: Bowel sounds are normal       Palpations: Abdomen is soft  Tenderness: There is no abdominal tenderness  Musculoskeletal:         General: Tenderness ( Has pain in the left shoulder when he lifts it up ) present  Normal range of motion  Cervical back: Normal range of motion and neck supple  No muscular tenderness  Right lower leg: No edema  Left lower leg: No edema  Skin:     General: Skin is warm  Findings: No rash  Neurological:      General: No focal deficit present  Mental Status: He is alert and oriented to person, place, and time  Motor: No weakness        Coordination: Coordination normal       Gait: Gait normal    Psychiatric:         Mood and Affect: Mood normal          Behavior: Behavior normal

## 2023-02-27 NOTE — ASSESSMENT & PLAN NOTE
Vitamin D deficient after being on vitamin D supplement vitamin D level 63 advised to continue same dose of vitamin D

## 2023-02-27 NOTE — ASSESSMENT & PLAN NOTE
He has been seen and followed by endocrinologist   Will be going for blood test as well as ultrasound yearly as advised by endocrinologist

## 2023-05-25 ENCOUNTER — OFFICE VISIT (OUTPATIENT)
Dept: INTERNAL MEDICINE CLINIC | Facility: CLINIC | Age: 58
End: 2023-05-25

## 2023-05-25 VITALS
DIASTOLIC BLOOD PRESSURE: 80 MMHG | SYSTOLIC BLOOD PRESSURE: 112 MMHG | WEIGHT: 236.6 LBS | OXYGEN SATURATION: 97 % | TEMPERATURE: 97.6 F | RESPIRATION RATE: 18 BRPM | HEART RATE: 66 BPM | HEIGHT: 70 IN | BODY MASS INDEX: 33.87 KG/M2

## 2023-05-25 DIAGNOSIS — R20.2 NUMBNESS AND TINGLING OF RIGHT LEG: Primary | ICD-10-CM

## 2023-05-25 DIAGNOSIS — R73.03 PREDIABETES: ICD-10-CM

## 2023-05-25 DIAGNOSIS — E05.90 SUBCLINICAL HYPERTHYROIDISM: ICD-10-CM

## 2023-05-25 DIAGNOSIS — R20.8 BURNING SENSATION OF LOWER EXTREMITY: ICD-10-CM

## 2023-05-25 DIAGNOSIS — R20.0 NUMBNESS AND TINGLING OF RIGHT LEG: Primary | ICD-10-CM

## 2023-05-25 DIAGNOSIS — E78.00 HYPERCHOLESTEREMIA: ICD-10-CM

## 2023-05-25 DIAGNOSIS — I10 ESSENTIAL HYPERTENSION: ICD-10-CM

## 2023-05-25 RX ORDER — IBUPROFEN 800 MG/1
1 TABLET ORAL AS NEEDED
COMMUNITY
Start: 2023-03-13

## 2023-05-25 RX ORDER — METHYLPREDNISOLONE 4 MG/1
TABLET ORAL
Qty: 21 EACH | Refills: 0 | Status: SHIPPED | OUTPATIENT
Start: 2023-05-25

## 2023-05-25 RX ORDER — SENNOSIDES 8.6 MG
CAPSULE ORAL
COMMUNITY
Start: 2023-03-13 | End: 2023-05-25 | Stop reason: ALTCHOICE

## 2023-05-25 NOTE — PROGRESS NOTES
Assessment/Plan:    1  Numbness and tingling of right leg  Assessment & Plan:  Tingling and numbness of right thigh laterally and anteriorly with burning sensation  Rule out radiculopathy  Rule out metabolic problem  Rule out questionable meralgia paresthetica  He never had a skin rash or signs of shingles  He was seen by chiropractor once had a chiropractic therapy once and was told may be a pinched nerve  We will start Medrol Dosepak  We will get x-ray of the lumbar spine  Will refer to specialist   We will check vitamin F35 folic acid level  Orders:  -     Folate; Future  -     Vitamin B12; Future  -     methylPREDNISolone 4 MG tablet therapy pack; Use as directed on package  -     Ambulatory Referral to Sports Medicine; Future  -     XR spine lumbar minimum 4 views non injury; Future; Expected date: 05/25/2023    2  Burning sensation of lower extremity  Assessment & Plan:  He has a burning sense of the right thigh more over laterally and some time anteriorly  Rule out secondary to radiculopathy  Remote possibility of meralgia paresthetica  Rule out any metabolic problem we will check vitamin L24 folic acid level  We will start Medrol Dosepak  Will refer to specialist     Orders:  -     Folate; Future  -     Vitamin B12; Future  -     methylPREDNISolone 4 MG tablet therapy pack; Use as directed on package  -     Ambulatory Referral to Sports Medicine; Future  -     XR spine lumbar minimum 4 views non injury; Future; Expected date: 05/25/2023    3  Essential hypertension  Assessment & Plan:  Blood pressure well controlled  Advised to continue present medication  Advised for low-salt diet  4  Hypercholesteremia  Assessment & Plan:  Last cholesterol 257,   Patient was referred to nutrition therapy but has not seen yet  Advised to watch diet for cholesterol and carbs  Was advised medication during last visit but patient does not want        5  Prediabetes  Assessment & Plan:  Patient has been watching diet for sugar and carbs intake  He was referred to be seen by nutrition therapy but has not seen yet  Last hemoglobin A1c 6 2       6  BMI 33 0-33 9,adult  Assessment & Plan:  Patient  was advised to decrease portion size  Advised to decrease carb, sugar, cholesterol intake  Advised to exercise 3-5 times per week  Advised to lose weight  7  Subclinical hyperthyroidism  Assessment & Plan:  He has been seen and followed by endocrinologist             Subjective: Patient presents with complaint of tingling numbness and burning sensation of the right thigh since almost more than 1 month  Patient ID: Justo Ceron is a 63 y o  male  HPI   52-year-old male patient presents with complaint of tingling numbness and burning sensation of the right thigh mainly of the right thigh laterally and sometimes anteriorly  He denies any back pain  Denies any skin rash or redness or signs of shingles  Had seen once  chiropractor about 2 weeks ago had a chiropractic therapy once , had some adjustment but did not help and was told might have a pinched nerve per patient  Denies any cough, fever, chills  Denies chest pain or shortness of breath in the abdomen  Denies nausea vomiting diarrhea  He had a left shoulder surgery      The following portions of the patient's history were reviewed and updated as appropriate:     Past Medical History:  He has a past medical history of Acute pain of left shoulder (2/27/2023), BMI 33 0-33 9,adult (5/25/2023), BMI 36 0-36 9,adult (04/27/2021), BMI 37 0-37 9, adult (01/26/2021), Bradycardia, Bronchitis (1/5/2023), Burning sensation of lower extremity (5/25/2023), Elevated PSA (04/27/2021), Hypercholesteremia (01/23/2021), Hyperglycemia (01/23/2021), Hyperplastic polyp of sigmoid colon (04/27/2021), Hypertension, Low TSH level (01/23/2021), Numbness and tingling of right leg (5/25/2023), Other insomnia (04/27/2021), Prediabetes (10/26/2021), Skin rash (4/26/2022), and Weight gain (01/23/2021)  ,  _______________________________________________________________________  Past Surgical History:   has a past surgical history that includes Hernia repair (Bilateral); Hemorroidectomy; Shoulder surgery (Right); Colonoscopy (2013); and [place] tooth extraction  ,  _______________________________________________________________________  Family History:  family history includes Hypertension in his father and mother; Other in his father; Prostate cancer in his family and father ,  _______________________________________________________________________  Social History:   reports that he has never smoked  He has never used smokeless tobacco  He reports current alcohol use  He reports that he does not use drugs  ,  _______________________________________________________________________  Allergies:  is allergic to penicillins     _______________________________________________________________________  Current Outpatient Medications   Medication Sig Dispense Refill   • amLODIPine (NORVASC) 5 mg tablet TAKE 1 TABLET BY MOUTH EVERY DAY 90 tablet 1   • Blood Pressure Monitoring (Blood Pressure Kit) KIT Use daily Adult 1 each 0   • Cholecalciferol (D3) 50 MCG (2000 UT) TABS      • CINNAMON PO Take by mouth daily     • methylPREDNISolone 4 MG tablet therapy pack Use as directed on package 21 each 0   • Multiple Vitamin (multivitamin) capsule Take 1 capsule by mouth daily     • ibuprofen (MOTRIN) 800 mg tablet Take 1 tablet by mouth as needed       No current facility-administered medications for this visit      _______________________________________________________________________  Review of Systems   Constitutional: Negative for chills and fever  HENT: Negative for congestion, ear pain, hearing loss, nosebleeds, sinus pain, sore throat and trouble swallowing  Eyes: Negative for discharge, redness and visual disturbance     Respiratory: Negative for cough, chest tightness and shortness "of breath  Cardiovascular: Negative for chest pain and palpitations  Gastrointestinal: Negative for abdominal pain, blood in stool, constipation, diarrhea, nausea and vomiting  Genitourinary: Negative for dysuria, flank pain and hematuria  Musculoskeletal: Negative for arthralgias, back pain, myalgias and neck pain  Skin: Negative for color change and rash  Neurological: Positive for numbness ( Tingling numbness and burning sensation of the right thigh )  Negative for dizziness, speech difficulty, weakness and headaches  Hematological: Does not bruise/bleed easily  Psychiatric/Behavioral: Negative for agitation and behavioral problems  Objective:  Vitals:    05/25/23 1254   BP: 112/80   BP Location: Right arm   Patient Position: Sitting   Cuff Size: Adult   Pulse: 66   Resp: 18   Temp: 97 6 °F (36 4 °C)   TempSrc: Tympanic   SpO2: 97%   Weight: 107 kg (236 lb 9 6 oz)   Height: 5' 10\" (1 778 m)     Body mass index is 33 95 kg/m²  Physical Exam  Vitals and nursing note reviewed  Constitutional:       General: He is not in acute distress  Appearance: He is obese  HENT:      Head: Normocephalic and atraumatic  Right Ear: Ear canal and external ear normal       Left Ear: Ear canal and external ear normal       Nose: Nose normal       Mouth/Throat:      Mouth: Mucous membranes are moist    Eyes:      General: No scleral icterus  Right eye: No discharge  Left eye: No discharge  Extraocular Movements: Extraocular movements intact  Conjunctiva/sclera: Conjunctivae normal    Cardiovascular:      Rate and Rhythm: Normal rate and regular rhythm  Pulses: Normal pulses  Heart sounds: No murmur heard  Pulmonary:      Effort: Pulmonary effort is normal  No respiratory distress  Breath sounds: Normal breath sounds  Abdominal:      General: Bowel sounds are normal       Palpations: Abdomen is soft  Tenderness: There is no abdominal tenderness   " Musculoskeletal:         General: Normal range of motion  Cervical back: Normal range of motion and neck supple  No muscular tenderness  Right lower leg: No edema  Left lower leg: No edema  Comments: Tiptoe walking normal   No tenderness on lumbar area  Skin:     General: Skin is warm  Findings: No rash  Comments: Lateral aspect of the right thigh skin very sensitive to touch  He feels like a electrical shocks when rubs on the right side of the thigh laterally  Neurological:      General: No focal deficit present  Mental Status: He is alert and oriented to person, place, and time  Motor: No weakness  Coordination: Coordination normal    Psychiatric:         Mood and Affect: Mood normal          Behavior: Behavior normal            I spent 30 minutes with the patient today    More than 50% time spent for reviewing of external notes, reviewing of the results of diagnostics test, management of care, patient education and ordering of test

## 2023-05-25 NOTE — ASSESSMENT & PLAN NOTE
Tingling and numbness of right thigh laterally and anteriorly with burning sensation  Rule out radiculopathy  Rule out metabolic problem  Rule out questionable meralgia paresthetica  He never had a skin rash or signs of shingles  He was seen by chiropractor once had a chiropractic therapy once and was told may be a pinched nerve  We will start Medrol Dosepak  We will get x-ray of the lumbar spine  Will refer to specialist   We will check vitamin Y97 folic acid level

## 2023-05-25 NOTE — ASSESSMENT & PLAN NOTE
Patient has been watching diet for sugar and carbs intake  He was referred to be seen by nutrition therapy but has not seen yet  Last hemoglobin A1c 6 2

## 2023-05-25 NOTE — PATIENT INSTRUCTIONS
Patient was advised to continue present medications  discussed with the patient medications and laboratory data in detail  Follow-up with me as advised  If any blood test was ordered please do 1 week prior to next appointment unless advise to get earlier    If you have any questions please call the office [phone]

## 2023-05-25 NOTE — ASSESSMENT & PLAN NOTE
Last cholesterol 257,   Patient was referred to nutrition therapy but has not seen yet  Advised to watch diet for cholesterol and carbs  Was advised medication during last visit but patient does not want

## 2023-05-25 NOTE — ASSESSMENT & PLAN NOTE
He has a burning sense of the right thigh more over laterally and some time anteriorly  Rule out secondary to radiculopathy  Remote possibility of meralgia paresthetica  Rule out any metabolic problem we will check vitamin D78 folic acid level  We will start Medrol Dosepak    Will refer to specialist

## 2023-05-27 ENCOUNTER — HOSPITAL ENCOUNTER (OUTPATIENT)
Dept: RADIOLOGY | Facility: HOSPITAL | Age: 58
Discharge: HOME | End: 2023-05-27
Attending: INTERNAL MEDICINE

## 2023-05-27 DIAGNOSIS — R20.0 NUMBNESS AND TINGLING OF RIGHT LEG: ICD-10-CM

## 2023-05-27 DIAGNOSIS — R20.2 NUMBNESS AND TINGLING OF RIGHT LEG: ICD-10-CM

## 2023-05-27 DIAGNOSIS — R20.8 BURNING SENSATION OF LOWER EXTREMITY: ICD-10-CM

## 2023-05-31 DIAGNOSIS — R20.0 NUMBNESS AND TINGLING OF RIGHT LEG: Primary | ICD-10-CM

## 2023-05-31 DIAGNOSIS — R20.2 NUMBNESS AND TINGLING OF RIGHT LEG: Primary | ICD-10-CM

## 2023-05-31 RX ORDER — GABAPENTIN 300 MG/1
300 CAPSULE ORAL
Qty: 30 CAPSULE | Refills: 0 | Status: SHIPPED | OUTPATIENT
Start: 2023-05-31 | End: 2023-06-30

## 2023-06-05 ENCOUNTER — TELEPHONE (OUTPATIENT)
Dept: INTERNAL MEDICINE CLINIC | Facility: CLINIC | Age: 58
End: 2023-06-05

## 2023-06-05 NOTE — TELEPHONE ENCOUNTER
----- Message from Madelin Crain MD sent at 6/5/2023  9:06 AM EDT -----  Please call patient that x-ray of his lower back spine revealed he has some arthritis but no fracture or any other major abnormality  To see orthopedic specialist as scheduled

## 2023-06-05 NOTE — TELEPHONE ENCOUNTER
Spoke with patient about test results of his X-ray and is aware  Pt had one question and he stated if orthopedic doctor is worth seeing sicne he has an appointment with a spin surgeon in July  - pt said he does not want to go if they do the same thing  Is there a difference between orthopedic and spine surgeon

## 2023-07-18 ENCOUNTER — TELEPHONE (OUTPATIENT)
Dept: INTERNAL MEDICINE CLINIC | Facility: CLINIC | Age: 58
End: 2023-07-18

## 2023-07-24 DIAGNOSIS — I10 ESSENTIAL HYPERTENSION: Primary | ICD-10-CM

## 2023-07-24 DIAGNOSIS — E78.00 HYPERCHOLESTEREMIA: ICD-10-CM

## 2023-07-24 DIAGNOSIS — R73.03 PREDIABETES: ICD-10-CM

## 2023-08-14 ENCOUNTER — TELEPHONE (OUTPATIENT)
Dept: UROLOGY | Facility: CLINIC | Age: 58
End: 2023-08-14

## 2023-08-14 NOTE — TELEPHONE ENCOUNTER
Call Pt left a generic vm regarding his PSA blood work need to be done prior his appt. Left office number [phone]. [place]

## 2023-08-15 ENCOUNTER — OFFICE VISIT (OUTPATIENT)
Dept: ENDOCRINOLOGY | Facility: CLINIC | Age: 58
End: 2023-08-15
Payer: COMMERCIAL

## 2023-08-15 VITALS
HEIGHT: 70 IN | DIASTOLIC BLOOD PRESSURE: 86 MMHG | WEIGHT: 257 LBS | HEART RATE: 68 BPM | SYSTOLIC BLOOD PRESSURE: 124 MMHG | BODY MASS INDEX: 36.79 KG/M2

## 2023-08-15 DIAGNOSIS — E05.90 SUBCLINICAL HYPERTHYROIDISM: Primary | ICD-10-CM

## 2023-08-15 DIAGNOSIS — E04.2 MULTIPLE THYROID NODULES: ICD-10-CM

## 2023-08-15 PROCEDURE — 99214 OFFICE O/P EST MOD 30 MIN: CPT | Performed by: NURSE PRACTITIONER

## 2023-08-15 NOTE — ASSESSMENT & PLAN NOTE
Last thyroid US from September 2022, demonstrated stable thyroid nodule. Denies neck compressive symptoms. Will repeat thyroid ultrasound for assessment of interval changes.

## 2023-08-15 NOTE — PROGRESS NOTES
Established Patient Progress Note       CC: Hyperthyroidism  Multiple thyroid nodules  Prediabetes     Impression & Plan:    Problem List Items Addressed This Visit        Endocrine    Subclinical hyperthyroidism - Primary     Due for repeat thyroid function tests, is clinically asymptomatic. Relevant Orders    TSH, 3rd generation    T4, free- Lab Collect    T3 Lab Collect    Multiple thyroid nodules     Last thyroid US from September 2022, demonstrated stable thyroid nodule. Denies neck compressive symptoms. Will repeat thyroid ultrasound for assessment of interval changes. Relevant Orders    US thyroid       Orders Placed This Encounter   Procedures   • US thyroid     Standing Status:   Future     Standing Expiration Date:   8/15/2027     Scheduling Instructions:      No prep required. Please bring your insurance cards, a form of photo ID and a list of your medications with you. Arrive 15 minutes prior to your appointment time in order to register. To schedule this appointment, please contact Central Scheduling at [phone]. • TSH, 3rd generation     This is a patient instruction: This test is non-fasting. Please drink two glasses of water morning of bloodwork. Standing Status:   Future     Standing Expiration Date:   2/15/2024   • T4, free- Lab Collect     Standing Status:   Future     Standing Expiration Date:   8/15/2024   • T3 Lab Collect     Standing Status:   Future     Standing Expiration Date:   8/15/2024       History of Present Illness:     Heather Tran is a 63 y.o. male with a history of multiple thyroid nodules and subclinical hyperthyroidism last seen by our office in February 2023. 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 For hyperthyroidism, has a history of negative anti-thyroid antibodies with negative TSI. Last thyroid function tests from February 2023. Denies change in energy level or weight.    Denies anxiety, jitteriness, or tremors. Denies tachycardia or palpitations. Denies constipation or hyperdefecation. Denies frequent headaches. Denies temperature intolerances. For thyroid nodules, denies compressive symptoms in the neck. Denies family history of thyroid cancer. Denies personal history of radiation exposure to head or neck including childhood and adolescent history. Last ultrasound was from September 2022 which demonstrated stable dominant isthmic nodule measuring 1.7x1. 2x1.8 cm.     Patient Active Problem List   Diagnosis   • Bradycardia   • Weight gain   • Hypercholesteremia   • Low TSH level   • BMI 37.0-37.9, adult   • Hyperplastic polyp of sigmoid colon   • BMI 36.0-36.9,adult   • Elevated PSA   • Other insomnia   • Thyromegaly   • Subclinical hyperthyroidism   • Vitamin D deficiency   • Prediabetes   • Essential hypertension   • Multiple thyroid nodules   • Skin rash   • Acute low back pain   • Spasm of muscle of lower back   • Bronchitis   • Acute pain of left shoulder   • Annual physical exam   • Numbness and tingling of right leg   • Burning sensation of lower extremity   • BMI 33.0-33.9,adult      Past Medical History:   Diagnosis Date   • Acute pain of left shoulder 2/27/2023   • BMI 33.0-33.9,adult 5/25/2023   • BMI 36.0-36.9,adult 04/27/2021   • BMI 37.0-37.9, adult 01/26/2021   • Bradycardia    • Bronchitis 1/5/2023   • Burning sensation of lower extremity 5/25/2023   • Elevated PSA 04/27/2021   • Hypercholesteremia 01/23/2021   • Hyperglycemia 01/23/2021   • Hyperplastic polyp of sigmoid colon 04/27/2021   • Hypertension    • Low TSH level 01/23/2021   • Numbness and tingling of right leg 5/25/2023   • Other insomnia 04/27/2021   • Prediabetes 10/26/2021   • Skin rash 4/26/2022   • Weight gain 01/23/2021      Past Surgical History:   Procedure Laterality Date   • [facility]COPY  2013    [facility] [place]    • HEMORROIDECTOMY      [id number]    • HERNIA REPAIR Bilateral     In childhood    • SHOULDER SURGERY Left Pain - impingement   • WISDOM TOOTH EXTRACTION        Family History   Problem Relation Age of Onset   • Hypertension Mother    • Other Father         pacemaker    • Hypertension Father    • Prostate cancer Father         diagnosed in his 62s    • Prostate cancer Family      Social History     Tobacco Use   • Smoking status: Never     Passive exposure: Past   • Smokeless tobacco: Never   Substance Use Topics   • Alcohol use: Yes     Alcohol/week: 4.0 standard drinks of alcohol     Types: 4 Standard drinks or equivalent per week     Comment: Occasional      Allergies   Allergen Reactions   • Penicillins Swelling       Current Outpatient Medications:   •  amLODIPine (NORVASC) 5 mg tablet, TAKE 1 TABLET BY MOUTH EVERY DAY, Disp: 90 tablet, Rfl: 1  •  Blood Pressure Monitoring (Blood Pressure Kit) KIT, Use daily Adult, Disp: 1 each, Rfl: 0  •  Cholecalciferol (D3) 50 MCG (2000 UT) TABS, , Disp: , Rfl:   •  CINNAMON PO, Take by mouth daily, Disp: , Rfl:   •  Multiple Vitamin (multivitamin) capsule, Take 1 capsule by mouth daily, Disp: , Rfl:     Review of Systems   See HPI. All other systems reviewed and are negative. Physical Exam:  Body mass index is 36.88 kg/m². /86   Pulse 68   Ht 5' 10" (1.778 m)   Wt 117 kg (257 lb)   BMI 36.88 kg/m²    Wt Readings from Last 3 Encounters:   08/15/23 117 kg (257 lb)   05/25/23 107 kg (236 lb 9.6 oz)   02/27/23 118 kg (260 lb)        Physical Exam  Vitals reviewed. Constitutional:       Appearance: Normal appearance. No thromegaly  Cardiovascular:      Rate and Rhythm: Normal rate and regular rhythm. Pulses: Normal pulses. Heart sounds: Normal heart sounds. Pulmonary:      Effort: Pulmonary effort is normal.      Breath sounds: Normal breath sounds. Skin:     General: Skin is warm and dry. Capillary Refill: Capillary refill takes less than 2 seconds. Neurological:      General: No focal deficit present.       Mental Status: He is alert and oriented to person, place, and time. No tremors on outstretched arms. Psychiatric:         Mood and Affect: Mood normal.         Behavior: Behavior normal.     Labs:     Lab Results   Component Value Date    CREATININE 1.11 05/04/2020    BUN 9 05/04/2020    K 3.8 05/04/2020     05/04/2020    CO2 29 05/04/2020     eGFR   Date Value Ref Range Status   05/04/2020 87 ml/min/1.73sq m Final       No results found for: "CHOL", "HDL", "TRIG", "CHOLHDL"    Lab Results   Component Value Date    ALT 28 05/04/2020    AST 21 05/04/2020    ALKPHOS 94 05/04/2020       No results found for: "Doris Buanastasiaard", "TSI"    There are no Patient Instructions on file for this visit. Discussed with the patient and all questioned fully answered. He will call me if any problems arise. Follow-up appointment in 6 months.      Counseled patient on diagnostic results, prognosis, risk and benefit of treatment options, instruction for management, importance of treatment compliance, Risk  factor reduction and impressions    DANY Ramos

## 2023-08-21 ENCOUNTER — OFFICE VISIT (OUTPATIENT)
Dept: UROLOGY | Facility: CLINIC | Age: 58
End: 2023-08-21
Payer: COMMERCIAL

## 2023-08-21 VITALS
HEART RATE: 67 BPM | DIASTOLIC BLOOD PRESSURE: 80 MMHG | RESPIRATION RATE: 20 BRPM | SYSTOLIC BLOOD PRESSURE: 132 MMHG | WEIGHT: 257 LBS | HEIGHT: 70 IN | OXYGEN SATURATION: 99 % | BODY MASS INDEX: 36.79 KG/M2

## 2023-08-21 DIAGNOSIS — R97.20 ELEVATED PSA: Primary | ICD-10-CM

## 2023-08-21 PROCEDURE — 99213 OFFICE O/P EST LOW 20 MIN: CPT | Performed by: PHYSICIAN ASSISTANT

## 2023-08-21 NOTE — PROGRESS NOTES
UROLOGY PROGRESS NOTE   Patient Identifiers: Doris Muñiz (MRN [id number])  Date of Service: 8/21/2023    Subjective:   71-year-old -American  man history of elevated PSA and family history of prostate cancer. PSA has been as high as 5 and is currently 4.21. MRI showed PI-RADS 2 with a 65 g prostate gland. No voiding complaints.     Reason for visit: Elevated PSA follow-up    Objective:     VITALS:    Vitals:    08/21/23 1042   BP: 132/80   Pulse: 67   Resp: 20   SpO2: 99%     AUA SYMPTOM SCORE    Flowsheet Row Most Recent Value   AUA SYMPTOM SCORE    How often have you had a sensation of not emptying your bladder completely after you finished urinating? 0 (P)     How often have you had to urinate again less than two hours after you finished urinating? 0 (P)     How often have you found you stopped and started again several times when you urinate? 0 (P)     How often have you found it difficult to postpone urination? 0 (P)     How often have you had a weak urinary stream? 0 (P)     How often have you had to push or strain to begin urination? 0 (P)     How many times did you most typically get up to urinate from the time you went to bed at night until the time you got up in the morning? 1 (P)     Quality of Life: If you were to spend the rest of your life with your urinary condition just the way it is now, how would you feel about that? 1 (P)     AUA SYMPTOM SCORE 1 (P)             LABS:  Lab Results   Component Value Date    HGB 15.1 05/04/2020    HCT 46.2 05/04/2020    WBC 6.16 05/04/2020     05/04/2020   ]    Lab Results   Component Value Date    K 3.8 05/04/2020     05/04/2020    CO2 29 05/04/2020    BUN 9 05/04/2020    CREATININE 1.11 05/04/2020    CALCIUM 8.9 05/04/2020   ]        INPATIENT MEDS:    Current Outpatient Medications:   •  amLODIPine (NORVASC) 5 mg tablet, TAKE 1 TABLET BY MOUTH EVERY DAY, Disp: 90 tablet, Rfl: 1  •  Blood Pressure Monitoring (Blood Pressure Kit) KIT, Use daily Adult, Disp: 1 each, Rfl: 0  •  Cholecalciferol (D3) 50 MCG (2000 UT) TABS, , Disp: , Rfl:   •  CINNAMON PO, Take by mouth daily, Disp: , Rfl:   •  Multiple Vitamin (multivitamin) capsule, Take 1 capsule by mouth daily, Disp: , Rfl:       Physical Exam:   /80 (BP Location: Left arm, Patient Position: Sitting, Cuff Size: Adult)   Pulse 67   Resp 20   Ht 5' 10" (1.778 m)   Wt 117 kg (257 lb)   SpO2 99%   BMI 36.88 kg/m²   GEN: no acute distress    RESP: breathing comfortably with no accessory muscle use    ABD: soft, non-tender, non-distended   INCISION:    EXT: no significant peripheral edema       RADIOLOGY:   None    Assessment:   #1.   Elevated PSA    Plan:   -Follow-up in 6 months with PSA prior to visit  -  -  -

## 2023-08-22 LAB — HBA1C MFR BLD HPLC: 6.2 %

## 2023-08-28 ENCOUNTER — OFFICE VISIT (OUTPATIENT)
Dept: INTERNAL MEDICINE CLINIC | Facility: CLINIC | Age: 58
End: 2023-08-28
Payer: COMMERCIAL

## 2023-08-28 VITALS
OXYGEN SATURATION: 98 % | RESPIRATION RATE: 18 BRPM | SYSTOLIC BLOOD PRESSURE: 128 MMHG | HEART RATE: 64 BPM | BODY MASS INDEX: 36.65 KG/M2 | WEIGHT: 256 LBS | DIASTOLIC BLOOD PRESSURE: 80 MMHG | TEMPERATURE: 97.8 F | HEIGHT: 70 IN

## 2023-08-28 DIAGNOSIS — M25.512 CHRONIC LEFT SHOULDER PAIN: ICD-10-CM

## 2023-08-28 DIAGNOSIS — Z79.899 HIGH RISK MEDICATION USE: ICD-10-CM

## 2023-08-28 DIAGNOSIS — R00.1 BRADYCARDIA: ICD-10-CM

## 2023-08-28 DIAGNOSIS — R73.03 PREDIABETES: ICD-10-CM

## 2023-08-28 DIAGNOSIS — E04.2 MULTIPLE THYROID NODULES: ICD-10-CM

## 2023-08-28 DIAGNOSIS — R97.20 ELEVATED PSA: ICD-10-CM

## 2023-08-28 DIAGNOSIS — G89.29 CHRONIC LEFT SHOULDER PAIN: ICD-10-CM

## 2023-08-28 DIAGNOSIS — I10 ESSENTIAL HYPERTENSION: Primary | ICD-10-CM

## 2023-08-28 DIAGNOSIS — E55.9 VITAMIN D DEFICIENCY: ICD-10-CM

## 2023-08-28 DIAGNOSIS — E78.00 HYPERCHOLESTEREMIA: ICD-10-CM

## 2023-08-28 DIAGNOSIS — K63.5 HYPERPLASTIC POLYP OF SIGMOID COLON: ICD-10-CM

## 2023-08-28 PROCEDURE — 99214 OFFICE O/P EST MOD 30 MIN: CPT | Performed by: INTERNAL MEDICINE

## 2023-08-28 RX ORDER — ROSUVASTATIN CALCIUM 5 MG/1
5 TABLET, COATED ORAL DAILY
Qty: 90 TABLET | Refills: 1 | Status: SHIPPED | OUTPATIENT
Start: 2023-08-28

## 2023-08-28 RX ORDER — AMLODIPINE BESYLATE 5 MG/1
5 TABLET ORAL DAILY
Qty: 90 TABLET | Refills: 1 | Status: SHIPPED | OUTPATIENT
Start: 2023-08-28

## 2023-08-28 NOTE — ASSESSMENT & PLAN NOTE
Uncontrolled on diet. Cholesterol 269, triglyceride 100, HDL 60, . Advised to start medication for cholesterol patient agreed to start a very small dose so started on rosuvastatin 5 mg once a day. Advised to continue low-cholesterol low-carb diet. Will follow lipid panel for function and CPK. Patient denies any alcohol abuse.

## 2023-08-28 NOTE — PROGRESS NOTES
Assessment/Plan:    1. Essential hypertension  -     Comprehensive metabolic panel; Future  -     amLODIPine (NORVASC) 5 mg tablet; Take 1 tablet (5 mg total) by mouth daily    2. Multiple thyroid nodules    3. Hyperplastic polyp of sigmoid colon    4. Hypercholesteremia  Assessment & Plan:  Uncontrolled on diet. Cholesterol 269, triglyceride 100, HDL 60, . Advised to start medication for cholesterol patient agreed to start a very small dose so started on rosuvastatin 5 mg once a day. Advised to continue low-cholesterol low-carb diet. Will follow lipid panel for function and CPK. Patient denies any alcohol abuse. Orders:  -     rosuvastatin (CRESTOR) 5 mg tablet; Take 1 tablet (5 mg total) by mouth daily  -     Comprehensive metabolic panel; Future  -     Lipid panel; Future  -     CK; Future    5. Bradycardia    6. Prediabetes  -     Comprehensive metabolic panel; Future    7. Vitamin D deficiency    8. Elevated PSA    9. Chronic left shoulder pain    10. High risk medication use  -     Comprehensive metabolic panel; Future  -     CK; Future      His blood pressure well controlled. Advised to continue present medication and low-salt diet. He has been seen and followed by an endocrinologist for thyroid nodule as well as his subclinical hyperthyroidism. He had a colonoscopy January 2022 was advised follow-up after 5 years. His bradycardia is stable asymptomatic. Was seen by cardiologist had stress test and Holter monitor. Fasting blood sugar 123 hemoglobin A1c 6.2 consistent with prediabetes advised to watch diet for sugar carbs intake and lose weight. Vitamin D deficit resolved after being on vitamin D supplement. He has been seen and followed by urologist for his elevated PSA. He has a chronic left shoulder pain had a surgery on physical therapy has been followed by specialist.    Subjective: Patient presents for follow-up. Patient ID: Allison Marcelo is a 63 y.o. male.     HPI 64-year-old male patient presents to follow-up his medical problems. He denies any chest pain, shortness of breath, pain in abdomen. Denies any cough, fever, chills. Denies nausea vomiting diarrhea. He has been seen and followed by specialist for his chronic left shoulder pain. He is on physical therapy. He was seen by urologist as well as endocrinologist.    The following portions of the patient's history were reviewed and updated as appropriate:     Past Medical History:  He has a past medical history of Acute pain of left shoulder (2/27/2023), BMI 33.0-33.9,adult (5/25/2023), BMI 36.0-36.9,adult (04/27/2021), BMI 37.0-37.9, adult (01/26/2021), Bradycardia, Bronchitis (1/5/2023), Burning sensation of lower extremity (5/25/2023), Chronic left shoulder pain (8/28/2023), Elevated PSA (04/27/2021), Hypercholesteremia (01/23/2021), Hyperglycemia (01/23/2021), Hyperplastic polyp of sigmoid colon (04/27/2021), Hypertension, Low TSH level (01/23/2021), Numbness and tingling of right leg (5/25/2023), Other insomnia (04/27/2021), Prediabetes (10/26/2021), Skin rash (4/26/2022), and Weight gain (01/23/2021). ,  _______________________________________________________________________  Past Surgical History:   has a past surgical history that includes Hernia repair (Bilateral); Hemorroidectomy; Shoulder surgery (Left); Colonoscopy (2013); [place] tooth extraction; and Shoulder arthroscopy w/ rotator cuff repair (Left, 03/2023). ,  _______________________________________________________________________  Family History:  family history includes Hypertension in his father and mother; Other in his father; Prostate cancer in his family and father.,  _______________________________________________________________________  Social History:   reports that he has never smoked. He has been exposed to tobacco smoke. He has never used smokeless tobacco. He reports current alcohol use of about 4.0 standard drinks of alcohol per week.  He reports that he does not use drugs. ,  _______________________________________________________________________  Allergies:  is allergic to penicillin g and penicillins. .  _______________________________________________________________________  Current Outpatient Medications   Medication Sig Dispense Refill   • amLODIPine (NORVASC) 5 mg tablet Take 1 tablet (5 mg total) by mouth daily 90 tablet 1   • Blood Pressure Monitoring (Blood Pressure Kit) KIT Use daily Adult 1 each 0   • Cholecalciferol (D3) 50 MCG (2000 UT) TABS      • CINNAMON PO Take by mouth daily     • Multiple Vitamin (multivitamin) capsule Take 1 capsule by mouth daily     • rosuvastatin (CRESTOR) 5 mg tablet Take 1 tablet (5 mg total) by mouth daily 90 tablet 1     No current facility-administered medications for this visit.     _______________________________________________________________________  Review of Systems   Constitutional: Negative for chills and fever. HENT: Negative for congestion, ear pain, hearing loss, nosebleeds, sinus pain, sore throat and trouble swallowing. Eyes: Negative for discharge, redness and visual disturbance. Respiratory: Negative for cough, chest tightness and shortness of breath. Cardiovascular: Negative for chest pain and palpitations. Gastrointestinal: Negative for abdominal pain, blood in stool, constipation, diarrhea, nausea and vomiting. Genitourinary: Negative for dysuria, flank pain and hematuria. Musculoskeletal: Positive for arthralgias ( Left shoulder area). Negative for myalgias and neck pain. Skin: Negative for color change and rash. Neurological: Negative for dizziness, speech difficulty, weakness and headaches. Hematological: Does not bruise/bleed easily. Psychiatric/Behavioral: Negative for agitation and behavioral problems.            Objective:  Vitals:    08/28/23 0954   BP: 128/80   BP Location: Left arm   Patient Position: Sitting   Cuff Size: Large   Pulse: 64   Resp: 18 Temp: 97.8 °F (36.6 °C)   TempSrc: Temporal   SpO2: 98%   Weight: 116 kg (256 lb)   Height: 5' 10" (1.778 m)     Body mass index is 36.73 kg/m². Physical Exam  Vitals and nursing note reviewed. Constitutional:       General: He is not in acute distress. Appearance: He is obese. HENT:      Head: Normocephalic and atraumatic. Right Ear: Ear canal and external ear normal.      Left Ear: Ear canal and external ear normal.      Nose: Nose normal.      Mouth/Throat:      Mouth: Mucous membranes are moist.   Eyes:      General: No scleral icterus. Right eye: No discharge. Left eye: No discharge. Extraocular Movements: Extraocular movements intact. Conjunctiva/sclera: Conjunctivae normal.   Cardiovascular:      Rate and Rhythm: Normal rate and regular rhythm. Pulses: Normal pulses. Heart sounds: No murmur heard. Pulmonary:      Effort: Pulmonary effort is normal. No respiratory distress. Breath sounds: Normal breath sounds. No wheezing, rhonchi or rales. Abdominal:      General: Bowel sounds are normal.      Palpations: Abdomen is soft. Tenderness: There is no abdominal tenderness. Musculoskeletal:      Cervical back: Normal range of motion and neck supple. No muscular tenderness. Right lower leg: No edema. Left lower leg: No edema. Skin:     General: Skin is warm. Findings: No rash. Neurological:      General: No focal deficit present. Mental Status: He is alert and oriented to person, place, and time. Motor: No weakness. Coordination: Coordination normal.      Gait: Gait normal.   Psychiatric:         Mood and Affect: Mood normal.         Behavior: Behavior normal.       I spent 30 minutes with the patient today.   More than 50% time spent for reviewing of external notes, reviewing of the results of diagnostics test, management of care, patient education and ordering of test.

## 2023-08-28 NOTE — PATIENT INSTRUCTIONS
Patient was advised to continue present medications. discussed with the patient medications and laboratory data in detail. Follow-up with me in 3 months or as advised. If any blood test was ordered please do 1 week prior to next appointment unless advise to get earlier.   If you have any questions please call the office [phone]

## 2023-08-30 ENCOUNTER — HOSPITAL ENCOUNTER (OUTPATIENT)
Dept: ULTRASOUND IMAGING | Facility: HOSPITAL | Age: 58
Discharge: HOME | End: 2023-08-30
Payer: COMMERCIAL

## 2023-08-30 DIAGNOSIS — E04.2 MULTIPLE THYROID NODULES: ICD-10-CM

## 2023-08-30 PROCEDURE — 76536 US EXAM OF HEAD AND NECK: CPT

## 2023-09-08 ENCOUNTER — TELEPHONE (OUTPATIENT)
Dept: ENDOCRINOLOGY | Facility: CLINIC | Age: 58
End: 2023-09-08

## 2023-09-08 NOTE — TELEPHONE ENCOUNTER
Please call inform patient of results. Stable thyroid nodules.   Plan for repeat ultrasound in 1 year

## 2023-11-02 ENCOUNTER — TELEPHONE (OUTPATIENT)
Age: 58
End: 2023-11-02

## 2023-11-02 NOTE — TELEPHONE ENCOUNTER
Pt requesting to have his lab for [facility] visit to be mailed to him.   Address confimed

## 2023-12-11 ENCOUNTER — OFFICE VISIT (OUTPATIENT)
Dept: INTERNAL MEDICINE CLINIC | Facility: CLINIC | Age: 58
End: 2023-12-11
Payer: COMMERCIAL

## 2023-12-11 VITALS
HEART RATE: 62 BPM | TEMPERATURE: 97.7 F | OXYGEN SATURATION: 98 % | BODY MASS INDEX: 36.94 KG/M2 | SYSTOLIC BLOOD PRESSURE: 132 MMHG | HEIGHT: 70 IN | RESPIRATION RATE: 18 BRPM | DIASTOLIC BLOOD PRESSURE: 90 MMHG | WEIGHT: 258 LBS

## 2023-12-11 DIAGNOSIS — R97.20 ELEVATED PSA: ICD-10-CM

## 2023-12-11 DIAGNOSIS — E05.90 SUBCLINICAL HYPERTHYROIDISM: ICD-10-CM

## 2023-12-11 DIAGNOSIS — E04.2 MULTIPLE THYROID NODULES: ICD-10-CM

## 2023-12-11 DIAGNOSIS — I10 ESSENTIAL HYPERTENSION: ICD-10-CM

## 2023-12-11 DIAGNOSIS — Z79.899 HIGH RISK MEDICATION USE: ICD-10-CM

## 2023-12-11 DIAGNOSIS — R73.03 PREDIABETES: ICD-10-CM

## 2023-12-11 DIAGNOSIS — E78.00 HYPERCHOLESTEREMIA: Primary | ICD-10-CM

## 2023-12-11 PROCEDURE — 99213 OFFICE O/P EST LOW 20 MIN: CPT | Performed by: INTERNAL MEDICINE

## 2023-12-11 RX ORDER — MULTIVITAMIN WITH IRON
100 TABLET ORAL DAILY
COMMUNITY

## 2023-12-11 NOTE — PATIENT INSTRUCTIONS
Patient was advised to continue present medications. discussed with the patient medications and laboratory data in detail. Follow-up with me in 4 months or as advised. If any blood test was ordered please do 1 week prior to next appointment unless advise to get earlier.   If you have any questions please call the office [phone]

## 2023-12-11 NOTE — PROGRESS NOTES
Assessment/Plan:    1. Hypercholesteremia  -     Lipid panel; Future  -     Comprehensive metabolic panel; Future    2. Essential hypertension  -     CBC and differential; Future  -     Comprehensive metabolic panel; Future    3. Multiple thyroid nodules    4. Subclinical hyperthyroidism  -     CBC and differential; Future    5. BMI 37.0-37.9, adult    6. Elevated PSA    7. Prediabetes  -     CBC and differential; Future  -     Comprehensive metabolic panel; Future  -     Hemoglobin A1C; Future    8. High risk medication use  -     Comprehensive metabolic panel; Future      BMI Counseling: Body mass index is 37.02 kg/m². The BMI is above normal. Nutrition recommendations include decreasing portion sizes, decreasing fast food intake, consuming healthier snacks, limiting drinks that contain sugar, moderation in carbohydrate intake, reducing intake of saturated and trans fat and reducing intake of cholesterol. Exercise recommendations include exercising 3-5 times per week. No pharmacotherapy was ordered. Rationale for BMI follow-up plan is due to patient being overweight or obese. Cholesterol significantly improved. Cholesterol decreased from 2 69-1 91 LDL decreased from 1 89-1 22 after being on rosuvastatin 5 mg daily. Triglycerides 114, HDL 46. Advised to continue present medication and low-cholesterol diet. Blood pressure elevated. Patient says last night he did not sleep well and has some alcohol drink at football party. Advised to monitor blood pressure and keep log if possible. Will continue present dose of amlodipine 5 mg daily. Advised for low-salt diet. He has been seen and followed by an endocrinologist for his thyroid problem. He has been seen and followed by urologist for elevated PSA. Fasting blood sugar 137 but hemoglobin A1c was 6.2 last time. Discussed with the patient strongly watch diet for sugar and carbs intake. Will follow blood sugar hemoglobin A1c next time.     He has been getting physical therapy for his left shoulder pain and has been seen and followed by orthopedic specialist.    Patient declined for influenza vaccination. Subjective: Patient presents for follow-up. Patient ID: Esther Gonzalez is a 63 y.o. male. HPI  50-year-old male patient presents to follow-up his medical problems. He denies any chest pain, shortness of breath, pain in the abdomen. Denies any cough, fever, chills. [place] any nausea vomiting diarrhea. The following portions of the patient's history were reviewed and updated as appropriate:     Past Medical History:  He has a past medical history of Acute pain of left shoulder (2/27/2023), BMI 33.0-33.9,adult (5/25/2023), BMI 36.0-36.9,adult (04/27/2021), BMI 37.0-37.9, adult (01/26/2021), Bradycardia, Bronchitis (1/5/2023), Burning sensation of lower extremity (5/25/2023), Chronic left shoulder pain (8/28/2023), Elevated PSA (04/27/2021), Hypercholesteremia (01/23/2021), Hyperglycemia (01/23/2021), Hyperplastic polyp of sigmoid colon (04/27/2021), Hypertension, Low TSH level (01/23/2021), Numbness and tingling of right leg (5/25/2023), Other insomnia (04/27/2021), Prediabetes (10/26/2021), Skin rash (4/26/2022), and Weight gain (01/23/2021). ,  _______________________________________________________________________  Past Surgical History:   has a past surgical history that includes Hernia repair (Bilateral); Hemorroidectomy; Shoulder surgery (Left); Colonoscopy (2013); [place] tooth extraction; and Shoulder arthroscopy w/ rotator cuff repair (Left, 03/2023). ,  _______________________________________________________________________  Family History:  family history includes Hypertension in his father and mother; Other in his father; Prostate cancer in his family and father.,  _______________________________________________________________________  Social History:   reports that he has never smoked. He has been exposed to tobacco smoke.  He has never used smokeless tobacco. He reports current alcohol use of about 4.0 standard drinks of alcohol per week. He reports that he does not use drugs. ,  _______________________________________________________________________  Allergies:  is allergic to penicillin g and penicillins. .  _______________________________________________________________________  Current Outpatient Medications   Medication Sig Dispense Refill    amLODIPine (NORVASC) 5 mg tablet Take 1 tablet (5 mg total) by mouth daily 90 tablet 1    Blood Pressure Monitoring (Blood Pressure Kit) KIT Use daily Adult 1 each 0    Cholecalciferol (D3) 50 MCG (2000 UT) TABS       CINNAMON PO Take by mouth daily      Multiple Vitamin (multivitamin) capsule Take 1 capsule by mouth daily      pyridoxine (VITAMIN B6) 100 mg tablet Take 100 mg by mouth daily      rosuvastatin (CRESTOR) 5 mg tablet Take 1 tablet (5 mg total) by mouth daily 90 tablet 1     No current facility-administered medications for this visit.     _______________________________________________________________________  Review of Systems   Constitutional:  Negative for chills and fever. HENT:  Negative for congestion, ear pain, hearing loss, nosebleeds, sinus pain, sore throat and trouble swallowing. Eyes:  Negative for discharge, redness and visual disturbance. Respiratory:  Negative for cough, chest tightness and shortness of breath. Cardiovascular:  Negative for chest pain and palpitations. Gastrointestinal:  Negative for abdominal pain, blood in stool, constipation, diarrhea, nausea and vomiting. Genitourinary:  Negative for dysuria, flank pain and hematuria. Musculoskeletal:  Positive for arthralgias (Left shoulder on physical therapy. ). Negative for myalgias and neck pain. Skin:  Negative for color change and rash. Neurological:  Negative for dizziness, speech difficulty, weakness and headaches. Hematological:  Does not bruise/bleed easily.    Psychiatric/Behavioral:  Negative for agitation and behavioral problems. Objective:  Vitals:    12/11/23 1319   BP: 132/90   BP Location: Left arm   Patient Position: Sitting   Cuff Size: Large   Pulse: 62   Resp: 18   Temp: 97.7 °F (36.5 °C)   TempSrc: Temporal   SpO2: 98%   Weight: 117 kg (258 lb)   Height: 5' 10" (1.778 m)     Body mass index is 37.02 kg/m². Physical Exam  Vitals and nursing note reviewed. Constitutional:       General: He is not in acute distress. Appearance: Normal appearance. He is obese. HENT:      Head: Normocephalic and atraumatic. Right Ear: Ear canal and external ear normal.      Left Ear: Ear canal and external ear normal.      Nose: Nose normal.      Mouth/Throat:      Mouth: Mucous membranes are moist.   Eyes:      General: No scleral icterus. Right eye: No discharge. Left eye: No discharge. Extraocular Movements: Extraocular movements intact. Conjunctiva/sclera: Conjunctivae normal.   Cardiovascular:      Rate and Rhythm: Normal rate and regular rhythm. Pulses: Normal pulses. Pulmonary:      Effort: Pulmonary effort is normal. No respiratory distress. Breath sounds: Normal breath sounds. No wheezing. Abdominal:      General: Bowel sounds are normal.      Palpations: Abdomen is soft. Tenderness: There is no abdominal tenderness. Musculoskeletal:         General: Normal range of motion. Cervical back: Normal range of motion and neck supple. No muscular tenderness. Right lower leg: No edema. Left lower leg: No edema. Skin:     General: Skin is warm. Findings: No rash. Neurological:      General: No focal deficit present. Mental Status: He is alert and oriented to person, place, and time. Motor: No weakness.       Coordination: Coordination normal.   Psychiatric:         Mood and Affect: Mood normal.         Behavior: Behavior normal.

## 2024-02-06 ENCOUNTER — OFFICE VISIT (OUTPATIENT)
Dept: ENDOCRINOLOGY | Facility: CLINIC | Age: 59
End: 2024-02-06
Payer: COMMERCIAL

## 2024-02-06 VITALS
SYSTOLIC BLOOD PRESSURE: 120 MMHG | OXYGEN SATURATION: 97 % | HEIGHT: 70 IN | BODY MASS INDEX: 37.37 KG/M2 | WEIGHT: 261 LBS | DIASTOLIC BLOOD PRESSURE: 80 MMHG | HEART RATE: 68 BPM

## 2024-02-06 DIAGNOSIS — R73.03 PRE-DIABETES: ICD-10-CM

## 2024-02-06 DIAGNOSIS — E55.9 VITAMIN D DEFICIENCY: ICD-10-CM

## 2024-02-06 DIAGNOSIS — E04.2 MULTIPLE THYROID NODULES: ICD-10-CM

## 2024-02-06 DIAGNOSIS — E05.90 SUBCLINICAL HYPERTHYROIDISM: Primary | ICD-10-CM

## 2024-02-06 PROCEDURE — 99214 OFFICE O/P EST MOD 30 MIN: CPT | Performed by: INTERNAL MEDICINE

## 2024-02-06 NOTE — PROGRESS NOTES
" Reid Jiang 59 y.o. male MRN: [id number]    Encounter: [id number]      Assessment/Plan     Subclinical hyperthyroidism  Multiple thyroid nodules  Clinically euthyroid  Repeat ultrasound to assess interval change in thyroid nodules around 8/2024  Repeat labs in 6 months     --Reviewed pathophysiology of hyperthyroidism, reviewing the hypothalamic-ptiuitary-thyroid axis and interpretation and significance of TSH, FT4, FT3 values.  --Discussed the use of HIGGINS and scan to evaluate cause of hyperthyroidism.  --Discussed possible etiologies of hyperthyroidism    3.  Vitamin D deficiency -check vitamin D level; continue supplementation    CC: subclinical hyperthyroidism     History of Present Illness     HPI:  Reid Jiang is a 59 y.o. male who is here for a follow-up of subclinical hyperthyroidism an thyroid nodules     Last seen in 8/2023  \"Initially noted to have suppressed TSH in 12/2020, had insomnia but no other symptoms of hyperthyroidism .  In follow-up, symptom improved however continue to have subclinical hyperthyroidism on labs, was decided to monitor  TSI, TPO, Tg Antibody negative\"     [facility] thyroid 9/2022 - stable nodules   [facility] 8/2023: stable nodules     C/o sore throat approx 3 times a month since October; also runny nose. Wonders if it is related to the weather. Has sick contacts  No swelling in the neck; no  obstructive symptoms   Difficulty losing weight   No other symptoms of hypo or hyperthyroidism      Taking D3 - dose not known   Pre-diabetes - not on any medications; following up with primary care physician     All other systems were reviewed and are negative.       Review of Systems    Historical Information   Past Medical History:   Diagnosis Date    Acute pain of left shoulder 2/27/2023    BMI 33.0-33.9,adult 5/25/2023    BMI 36.0-36.9,adult 04/27/2021    BMI 37.0-37.9, adult 01/26/2021    Bradycardia     Bronchitis 1/5/2023    Burning sensation of lower extremity 5/25/2023    Chronic left " shoulder pain 8/28/2023    Elevated PSA 04/27/2021    Hypercholesteremia 01/23/2021    Hyperglycemia 01/23/2021    Hyperplastic polyp of sigmoid colon 04/27/2021    Hypertension     Low TSH level 01/23/2021    Numbness and tingling of right leg 5/25/2023    Other insomnia 04/27/2021    Prediabetes 10/26/2021    Skin rash 4/26/2022    Weight gain 01/23/2021     Past Surgical History:   Procedure Laterality Date    COLONOSCOPY  2013    JONATHAN Yanez     HEMORROIDECTOMY      [id number]     HERNIA REPAIR Bilateral     In childhood     SHOULDER ARTHROSCOPY W/ ROTATOR CUFF REPAIR Left 03/2023    SHOULDER SURGERY Left     Pain - impingement    [place] TOOTH EXTRACTION       Social History   Social History     Substance and Sexual Activity   Alcohol Use Yes    Alcohol/week: 4.0 standard drinks of alcohol    Types: 4 Standard drinks or equivalent per week    Comment: Occasional      Social History     Substance and Sexual Activity   Drug Use Never    Comment: No dug use - As per [employer]     Social History     Tobacco Use   Smoking Status Never    Passive exposure: Past   Smokeless Tobacco Never     Family History:   Family History   Problem Relation Age of Onset    Hypertension Mother     Other Father         pacemaker     Hypertension Father     Prostate cancer Father         diagnosed in his 60s     No Known Problems Sister     No Known Problems Brother     Diabetes type II Maternal Uncle     Diabetes type II Maternal Uncle     Dialysis Maternal Uncle     No Known Problems Paternal Uncle     No Known Problems Maternal Grandmother     No Known Problems Maternal Grandfather     No Known Problems Paternal Grandmother     No Known Problems Paternal Grandfather     Prostate cancer Family     No Known Problems Daughter        Meds/Allergies   Current Outpatient Medications   Medication Sig Dispense Refill    amLODIPine (NORVASC) 5 mg tablet Take 1 tablet (5 mg total) by mouth daily 90 tablet 1    Cholecalciferol (D3) 50 MCG  "(2000 UT) TABS       CINNAMON PO Take by mouth daily      Multiple Vitamin (multivitamin) capsule Take 1 capsule by mouth daily      pyridoxine (VITAMIN B6) 100 mg tablet Take 100 mg by mouth daily      rosuvastatin (CRESTOR) 5 mg tablet Take 1 tablet (5 mg total) by mouth daily 90 tablet 1    Blood Pressure Monitoring (Blood Pressure Kit) KIT Use daily Adult 1 each 0     No current facility-administered medications for this visit.     Allergies   Allergen Reactions    Penicillin G Swelling    Penicillins Swelling       Objective   Vitals: Blood pressure 120/80, pulse 68, height 5' 10\" (1.778 m), weight 118 kg (261 lb), SpO2 97%.    Physical Exam  Constitutional:       General: He is not in acute distress.     Appearance: He is well-developed. He is not diaphoretic.   HENT:      Head: Normocephalic and atraumatic.   Eyes:      Conjunctiva/sclera: Conjunctivae normal.      Pupils: Pupils are equal, round, and reactive to light.   Neck:      Comments: Nodular enlarged thyroid, nontender, not fixed to the overlying skin or surrounding structures  Cardiovascular:      Rate and Rhythm: Normal rate and regular rhythm.      Heart sounds: Normal heart sounds. No murmur heard.  Pulmonary:      Effort: Pulmonary effort is normal. No respiratory distress.      Breath sounds: Normal breath sounds. No wheezing.   Abdominal:      General: There is no distension.      Palpations: Abdomen is soft.      Tenderness: There is no abdominal tenderness. There is no guarding.   Musculoskeletal:      Cervical back: Normal range of motion and neck supple.   Skin:     General: Skin is warm and dry.      Findings: No erythema or rash.   Neurological:      Mental Status: He is alert and oriented to person, place, and time.      Deep Tendon Reflexes: Reflexes normal.      Comments: No tremors on the outstretched arms      Psychiatric:         Behavior: Behavior normal.         Thought Content: Thought content normal.         The history was " "obtained from the review of the chart, patient.    Lab Results:      Lab Results   Component Value Date    WBC 6.16 05/04/2020    HGB 15.1 05/04/2020    HCT 46.2 05/04/2020    MCV 92 05/04/2020     05/04/2020     Lab Results   Component Value Date    CREATININE 1.17 12/05/2023    BUN 10 12/05/2023    K 3.9 12/05/2023     12/05/2023    CO2 26 12/05/2023     Lab Results   Component Value Date    HGBA1C 6.2 (H) 08/22/2023         Imaging Studies:   Results for orders placed during the hospital encounter of 08/30/23    US thyroid    Impression  Again noted are multiple thyroid nodules. These are stable    Some of these are spongiform nodules others. To solid. None of the nodules meet the criteria for further evaluation with the biopsy.    Follow-up ultrasound in 1 year suggested        Reference: ACR Thyroid Imaging, Reporting and Data System (TI-RADS): White Paper of the ACR TI-RADS Committee. J AM Ramiro Radiol 2017;14:587-595. (additional recommendations based on American Thyroid Association 2015 guidelines.)      Workstation performed: [id number]      I have personally reviewed pertinent reports.      Portions of the record may have been created with voice recognition software. Occasional wrong word or \"sound a like\" substitutions may have occurred due to the inherent limitations of voice recognition software. Read the chart carefully and recognize, using context, where substitutions have occurred.     "

## 2024-02-06 NOTE — PATIENT INSTRUCTIONS
Repeat ultrasound to assess interval change in thyroid nodules around 8/2024  Repeat labs in 6 months

## 2024-02-20 ENCOUNTER — OFFICE VISIT (OUTPATIENT)
Dept: UROLOGY | Facility: CLINIC | Age: 59
End: 2024-02-20
Payer: COMMERCIAL

## 2024-02-20 VITALS
HEART RATE: 66 BPM | OXYGEN SATURATION: 98 % | DIASTOLIC BLOOD PRESSURE: 70 MMHG | WEIGHT: 258 LBS | BODY MASS INDEX: 36.94 KG/M2 | SYSTOLIC BLOOD PRESSURE: 132 MMHG | HEIGHT: 70 IN

## 2024-02-20 DIAGNOSIS — E78.00 HYPERCHOLESTEREMIA: ICD-10-CM

## 2024-02-20 DIAGNOSIS — R97.20 ELEVATED PSA: Primary | ICD-10-CM

## 2024-02-20 PROCEDURE — 99213 OFFICE O/P EST LOW 20 MIN: CPT | Performed by: PHYSICIAN ASSISTANT

## 2024-02-20 RX ORDER — ROSUVASTATIN CALCIUM 5 MG/1
5 TABLET, COATED ORAL DAILY
Qty: 90 TABLET | Refills: 1 | Status: SHIPPED | OUTPATIENT
Start: 2024-02-20

## 2024-02-20 NOTE — PROGRESS NOTES
UROLOGY PROGRESS NOTE   Patient Identifiers: Reid Jiang (MRN [id number])  Date of Service: 2/20/2024    Subjective:   59-year-old -American man with history of elevated PSA and family history of prostate cancer.  His PSA has been as high as 5 and his last PSA was 4.21.  Current PSA 5.18.  MRI in 2022 was PI-RADS 2 with a 65 g prostate.    Reason for visit: Elevated PSA follow-up     Objective:     VITALS:    Vitals:    02/20/24 1122   BP: 132/70   Pulse: 66   SpO2: 98%     AUA SYMPTOM SCORE      Flowsheet Row Most Recent Value   AUA SYMPTOM SCORE    How often have you had a sensation of not emptying your bladder completely after you finished urinating? 0 (P)    How often have you had to urinate again less than two hours after you finished urinating? 0 (P)    How often have you found you stopped and started again several times when you urinate? 0 (P)    How often have you found it difficult to postpone urination? 0 (P)    How often have you had a weak urinary stream? 0 (P)    How often have you had to push or strain to begin urination? 0 (P)    How many times did you most typically get up to urinate from the time you went to bed at night until the time you got up in the morning? 1 (P)    Quality of Life: If you were to spend the rest of your life with your urinary condition just the way it is now, how would you feel about that? 1 (P)    AUA SYMPTOM SCORE 1 (P)               LABS:  Lab Results   Component Value Date    HGB 15.1 05/04/2020    HCT 46.2 05/04/2020    WBC 6.16 05/04/2020     05/04/2020   ]    Lab Results   Component Value Date    K 3.9 12/05/2023     12/05/2023    CO2 26 12/05/2023    BUN 10 12/05/2023    CREATININE 1.17 12/05/2023    CALCIUM 9.6 12/05/2023   ]        INPATIENT MEDS:    Current Outpatient Medications:     amLODIPine (NORVASC) 5 mg tablet, Take 1 tablet (5 mg total) by mouth daily, Disp: 90 tablet, Rfl: 1    Blood Pressure Monitoring (Blood Pressure Kit) KIT, Use  "daily Adult, Disp: 1 each, Rfl: 0    Cholecalciferol (D3) 50 MCG (2000 UT) TABS, , Disp: , Rfl:     CINNAMON PO, Take by mouth daily, Disp: , Rfl:     Multiple Vitamin (multivitamin) capsule, Take 1 capsule by mouth daily, Disp: , Rfl:     pyridoxine (VITAMIN B6) 100 mg tablet, Take 100 mg by mouth daily, Disp: , Rfl:     rosuvastatin (CRESTOR) 5 mg tablet, Take 1 tablet (5 mg total) by mouth daily, Disp: 90 tablet, Rfl: 1      Physical Exam:   /70 (BP Location: Left arm, Patient Position: Sitting, Cuff Size: Large)   Pulse 66   Ht 5' 10\" (1.778 m)   Wt 117 kg (258 lb)   SpO2 98%   BMI 37.02 kg/m²   GEN: no acute distress    RESP: breathing comfortably with no accessory muscle use    ABD: soft, non-tender, non-distended   INCISION:    EXT: no significant peripheral edema     RADIOLOGY:   none     Assessment:   #1.  Elevated PSA    Plan:   -I reviewed the findings and options of management with the patient  -He has never had a prostate biopsy  -I feel with a PSA of over 5, a family history and -American race we should proceed with a office-based prostate biopsy  -He agrees to proceed          "

## 2024-02-26 ENCOUNTER — TELEPHONE (OUTPATIENT)
Age: 59
End: 2024-02-26

## 2024-02-26 NOTE — TELEPHONE ENCOUNTER
Patient reports he did research and noted that the things he saw makes him wondering if the in office biopsy is less accurate then if he would get a fusion biopsy. Advised that is not a questing I feel comfortable asking but would ask the provider for him.  Will call him back regarding response.     Then asked if more then one doctor would be looking at his results of the biopsy. Advised its typically the pathologist and the the urologist at first. Advised that depending on the finding it would then branch out to other specialties after consults.

## 2024-02-26 NOTE — TELEPHONE ENCOUNTER
Patient would like a call back to go over the biopsy procedure that he's having soon.     CB: [phone]

## 2024-02-27 NOTE — TELEPHONE ENCOUNTER
Relayed response from [place] Patient verbalized understanding and will call back with any further questions

## 2024-02-27 NOTE — TELEPHONE ENCOUNTER
Patient has elevated PSA, family history of prostate cancer, and a previous negative MRI. Tissue sample would be the next recommendation. We are unable to have fusion guided biopsy with negative MRI. Thanks

## 2024-03-19 ENCOUNTER — NURSE TRIAGE (OUTPATIENT)
Age: 59
End: 2024-03-19

## 2024-03-19 ENCOUNTER — PROCEDURE VISIT (OUTPATIENT)
Dept: UROLOGY | Facility: CLINIC | Age: 59
End: 2024-03-19
Payer: COMMERCIAL

## 2024-03-19 VITALS
HEART RATE: 61 BPM | HEIGHT: 70 IN | SYSTOLIC BLOOD PRESSURE: 134 MMHG | OXYGEN SATURATION: 99 % | BODY MASS INDEX: 37.22 KG/M2 | DIASTOLIC BLOOD PRESSURE: 74 MMHG | WEIGHT: 260 LBS

## 2024-03-19 DIAGNOSIS — R97.20 ELEVATED PSA: Primary | ICD-10-CM

## 2024-03-19 PROCEDURE — 88344 IMHCHEM/IMCYTCHM EA MLT ANTB: CPT | Performed by: PATHOLOGY

## 2024-03-19 PROCEDURE — G0416 PROSTATE BIOPSY, ANY MTHD: HCPCS | Performed by: PATHOLOGY

## 2024-03-19 PROCEDURE — 96372 THER/PROPH/DIAG INJ SC/IM: CPT

## 2024-03-19 PROCEDURE — 76942 ECHO GUIDE FOR BIOPSY: CPT | Performed by: UROLOGY

## 2024-03-19 PROCEDURE — 55700 PR PROSTATE NEEDLE BIOPSY ANY APPROACH: CPT | Performed by: UROLOGY

## 2024-03-19 RX ORDER — LEVOFLOXACIN 500 MG/1
500 TABLET, FILM COATED ORAL EVERY 24 HOURS
Qty: 3 TABLET | Refills: 0 | Status: SHIPPED | OUTPATIENT
Start: 2024-03-19 | End: 2024-03-22

## 2024-03-19 RX ORDER — GENTAMICIN SULFATE 40 MG/ML
1 INJECTION, SOLUTION INTRAMUSCULAR; INTRAVENOUS ONCE
Status: COMPLETED | OUTPATIENT
Start: 2024-03-19 | End: 2024-03-19

## 2024-03-19 RX ADMIN — GENTAMICIN SULFATE 70 MG: 40 INJECTION, SOLUTION INTRAMUSCULAR; INTRAVENOUS at 08:05

## 2024-03-19 NOTE — ASSESSMENT & PLAN NOTE
Patient underwent a prostate biopsy today. He tolerated this well. We discussed return criteria. This centered on infectious concerns. We also discussed the risk for prolonged bleeding as well as urinary retention.  The patient understands he will see his results before I do through [employer]. I told him I will try to call him after I get the results but this may take 1-2 days. He verbalized understanding.

## 2024-03-19 NOTE — TELEPHONE ENCOUNTER
VADIM left requesting call back    When patient calls back please advise him an abx was sent to his pharmacy per his request.

## 2024-03-19 NOTE — TELEPHONE ENCOUNTER
Patient called in inquiring why he was not given an oral antibiotic after the biopsy today.   I informed patient he received a gentamicin injection prior tot he biopsy which is an antibiotic.   Patient inquiring if he can be prescribed an oral antibiotic as well to prevent infection.

## 2024-03-19 NOTE — TELEPHONE ENCOUNTER
Patient returned call. Relayed message that antibiotic was sent to pharmacy and reviewed instructions. Patient acknowledged understanding.

## 2024-03-19 NOTE — PROGRESS NOTES
Assessment/Plan:    Elevated PSA  Patient underwent a prostate biopsy today. He tolerated this well. We discussed return criteria. This centered on infectious concerns. We also discussed the risk for prolonged bleeding as well as urinary retention.  The patient understands he will see his results before I do through [email or web address]. I told him I will try to call him after I get the results but this may take 1-2 days. He verbalized understanding.          Subjective:      Patient ID: Reid Jiang is a 59 y.o. male.    HPI    59-year-old -American man with history of elevated PSA and family history of prostate cancer.     His PSA has been as high as 5 and his last PSA was 4.21. Current PSA 5.18. MRI in 2022 was PI-RADS 2 with a 65 g prostate.     The patient underwent prostate biopsy today which was difficult because his prostate was difficult to visualize.  Measured 75 g.  Standard biopsy template performed.    Past Surgical History:   Procedure Laterality Date    COLONOSCOPY  2013    JONATHAN Yane[facility]     HEMORROIDECTOMY      [id number]     HERNIA REPAIR Bilateral     In childhood     SHOULDER ARTHROSCOPY W/ ROTATOR CUFF REPAIR Left 03/2023    SHOULDER SURGERY Left     Pain - impingement    WISDOM TOOTH EXTRACTION          Past Medical History:   Diagnosis Date    Acute pain of left shoulder 2/27/2023    BMI 33.0-33.9,adult 5/25/2023    BMI 36.0-36.9,adult 04/27/2021    BMI 37.0-37.9, adult 01/26/2021    Bradycardia     Bronchitis 1/5/2023    Burning sensation of lower extremity 5/25/2023    Chronic left shoulder pain 8/28/2023    Elevated PSA 04/27/2021    Hypercholesteremia 01/23/2021    Hyperglycemia 01/23/2021    Hyperplastic polyp of sigmoid colon 04/27/2021    Hypertension     Low TSH level 01/23/2021    Numbness and tingling of right leg 5/25/2023    Other insomnia 04/27/2021    Prediabetes 10/26/2021    Skin rash 4/26/2022    Weight gain 01/23/2021        AUA SYMPTOM SCORE      Flowsheet Row Most Recent Value   AUA  "SYMPTOM SCORE    How often have you had a sensation of not emptying your bladder completely after you finished urinating? 0 (P)    How often have you had to urinate again less than two hours after you finished urinating? 1 (P)    How often have you found you stopped and started again several times when you urinate? 0 (P)    How often have you found it difficult to postpone urination? 0 (P)    How often have you had a weak urinary stream? 0 (P)    How often have you had to push or strain to begin urination? 0 (P)    How many times did you most typically get up to urinate from the time you went to bed at night until the time you got up in the morning? 1 (P)    Quality of Life: If you were to spend the rest of your life with your urinary condition just the way it is now, how would you feel about that? 1 (P)    AUA SYMPTOM SCORE 2 (P)              Review of Systems   Constitutional:  Negative for chills and fever.   HENT:  Negative for ear pain and sore throat.    Eyes:  Negative for pain and visual disturbance.   Respiratory:  Negative for cough and shortness of breath.    Cardiovascular:  Negative for chest pain and palpitations.   Gastrointestinal:  Negative for abdominal pain and vomiting.   Genitourinary:  Negative for dysuria and hematuria.   Musculoskeletal:  Negative for arthralgias and back pain.   Skin:  Negative for color change and rash.   Neurological:  Negative for seizures and syncope.   All other systems reviewed and are negative.        Objective:      /74 (BP Location: Left arm, Patient Position: Sitting, Cuff Size: Large)   Pulse 61   Ht 5' 10\" (1.778 m)   Wt 118 kg (260 lb)   SpO2 99%   BMI 37.31 kg/m²     No results found for: \"PSA\"       Physical Exam  Vitals reviewed.   Constitutional:       Appearance: Normal appearance. He is normal weight.   HENT:      Head: Normocephalic and atraumatic.   Eyes:      Pupils: Pupils are equal, round, and reactive to light.   Abdominal:      General: " "Abdomen is flat.   Neurological:      General: No focal deficit present.      Mental Status: He is alert and oriented to person, place, and time.   Psychiatric:         Mood and Affect: Mood normal.         Thought Content: Thought content normal.             Biopsy prostate     Date/Time  3/19/2024 8:30 AM     Performed by  Av Benitez MD   Authorized by  Av Benitez MD     Universal Protocol   Consent: Written consent obtained.  Consent given by: patient  Time out: Immediately prior to procedure a \"time out\" was called to verify the correct patient, procedure, equipment, support staff and site/side marked as required.  Patient understanding: patient states understanding of the procedure being performed  Patient consent: the patient's understanding of the procedure matches consent given  Procedure consent: procedure consent matches procedure scheduled  Relevant documents: relevant documents present and verified  Patient identity confirmed: verbally with patient      Local anesthesia used: yes      Anesthesia: local infiltration     Anesthesia   Local anesthesia used: yes  Local Anesthetic: lidocaine 2% without epinephrine     Sedation   Patient sedated: no        Specimen: yes    Culture: no   Procedure Details   Procedure Notes: A time-out was performed identifying correct patient and procedure.  An MA served as a chaperone and assistant for the procedure.  The patient is placed in left lateral decubitus position.  A digital rectal exam was performed but prostate too deep to palpate  An ultrasound probe was introduced into rectum.  The prostate was deep and harder than normal to visualize  A bilateral nerve block was performed at the junction of the seminal vesicle and base of the prostate.  The prostate volume was measured at 75cc.  A standard 12 core biopsy template was performed obtaining samples from the lateral and medial base, mid, and apex on each side  The patient tolerated the procedure well.  " Return criteria were discussed including the risks for infection bleeding and retention.  Patient tolerance: patient tolerated the procedure well with no immediate complications               Orders  Orders Placed This Encounter   Procedures    Biopsy prostate     This order was created via procedure documentation

## 2024-03-29 PROCEDURE — 88344 IMHCHEM/IMCYTCHM EA MLT ANTB: CPT | Performed by: PATHOLOGY

## 2024-03-29 PROCEDURE — G0416 PROSTATE BIOPSY, ANY MTHD: HCPCS | Performed by: PATHOLOGY

## 2024-04-02 ENCOUNTER — TELEPHONE (OUTPATIENT)
Dept: UROLOGY | Facility: CLINIC | Age: 59
End: 2024-04-02

## 2024-04-02 LAB
ALBUMIN SERPL-MCNC: 4.3 G/DL (ref 3.5–5.7)
ALP SERPL-CCNC: 83 U/L (ref 35–120)
ALT SERPL-CCNC: 14 U/L
ANION GAP SERPL CALCULATED.3IONS-SCNC: 11 MMOL/L (ref 3–11)
AST SERPL-CCNC: 17 U/L
BASOPHILS # BLD AUTO: 0 THOU/CMM (ref 0–0.1)
BASOPHILS NFR BLD AUTO: 0 %
BILIRUB SERPL-MCNC: 0.5 MG/DL (ref 0.2–1)
BUN SERPL-MCNC: 8 MG/DL (ref 7–28)
CALCIUM SERPL-MCNC: 9.3 MG/DL (ref 8.5–10.1)
CHLORIDE SERPL-SCNC: 101 MMOL/L (ref 100–109)
CHOLEST SERPL-MCNC: 187 MG/DL
CHOLEST/HDLC SERPL: 3.7 {RATIO}
CO2 SERPL-SCNC: 27 MMOL/L (ref 21–31)
CREAT SERPL-MCNC: 0.99 MG/DL (ref 0.53–1.3)
CYTOLOGY CMNT CVX/VAG CYTO-IMP: ABNORMAL
DIFFERENTIAL METHOD BLD: ABNORMAL
EOSINOPHIL # BLD AUTO: 0.1 THOU/CMM (ref 0–0.5)
EOSINOPHIL NFR BLD AUTO: 1 %
ERYTHROCYTE [DISTWIDTH] IN BLOOD BY AUTOMATED COUNT: 14.7 % (ref 12–16)
EST. AVERAGE GLUCOSE BLD GHB EST-MCNC: 126 MG/DL
GFR/BSA.PRED SERPLBLD CYS-BASED-ARV: 88 ML/MIN/{1.73_M2}
GLUCOSE SERPL-MCNC: 100 MG/DL (ref 65–99)
HBA1C MFR BLD: 6 %
HCT VFR BLD AUTO: 42.9 % (ref 37–48)
HDLC SERPL-MCNC: 51 MG/DL (ref 23–92)
HGB BLD-MCNC: 14.8 G/DL (ref 12.5–17)
LDLC SERPL CALC-MCNC: 115 MG/DL
LYMPHOCYTES # BLD AUTO: 2.9 THOU/CMM (ref 1–3)
LYMPHOCYTES NFR BLD AUTO: 38 %
MCH RBC QN AUTO: 30.3 PG (ref 27–36)
MCHC RBC AUTO-ENTMCNC: 34.6 G/DL (ref 32–37)
MCV RBC AUTO: 88 FL (ref 80–100)
MONOCYTES # BLD AUTO: 0.6 THOU/CMM (ref 0.3–1)
MONOCYTES NFR BLD AUTO: 8 %
NEUTROPHILS # BLD AUTO: 3.9 THOU/CMM (ref 1.8–7.8)
NEUTROPHILS NFR BLD AUTO: 53 %
NONHDLC SERPL-MCNC: 136 MG/DL
PLATELET # BLD AUTO: 383 THOU/CMM (ref 140–350)
PMV BLD REES-ECKER: 7.5 FL (ref 7.5–11.3)
POTASSIUM SERPL-SCNC: 4 MMOL/L (ref 3.5–5.2)
PROT SERPL-MCNC: 7.3 G/DL (ref 6.3–8.3)
RBC # BLD AUTO: 4.9 MILL/CMM (ref 4–5.4)
SODIUM SERPL-SCNC: 139 MMOL/L (ref 135–145)
TRIGL SERPL-MCNC: 107 MG/DL
WBC # BLD AUTO: 7.5 THOU/CMM (ref 4–10.5)

## 2024-04-02 NOTE — TELEPHONE ENCOUNTER
I called the patient discussed his pathology results which show low risk low-volume Carlo score 1 prostate cancer.  Recommend active surveillance but think would be useful to get decipher genetic testing as there is a chance if this comes back as high risk that could change my recommendation for a yes.      The patient understands and is in agreement with this plan.

## 2024-04-05 ENCOUNTER — TELEPHONE (OUTPATIENT)
Dept: UROLOGY | Facility: AMBULATORY SURGERY CENTER | Age: 59
End: 2024-04-05

## 2024-04-05 NOTE — TELEPHONE ENCOUNTER
"I am not able to overbook, so first OVL I see is may 14th on a \"held\" day  Should pt be rescheduled for 5/14 or please overbook sooner as I cannot overbook.    Thank you        Provider out of office   Emmanuel says \" reschedule to see me after decipher results are back, ok to overbook\"   "

## 2024-04-05 NOTE — TELEPHONE ENCOUNTER
Patient calling in to request he is scheduled sooner than May. Informed patient an appt for him is being worked on.    Verbalized understanding

## 2024-04-08 ENCOUNTER — OFFICE VISIT (OUTPATIENT)
Dept: INTERNAL MEDICINE CLINIC | Facility: CLINIC | Age: 59
End: 2024-04-08
Payer: COMMERCIAL

## 2024-04-08 VITALS
DIASTOLIC BLOOD PRESSURE: 90 MMHG | WEIGHT: 257 LBS | HEIGHT: 70 IN | OXYGEN SATURATION: 98 % | RESPIRATION RATE: 16 BRPM | TEMPERATURE: 97.4 F | SYSTOLIC BLOOD PRESSURE: 144 MMHG | BODY MASS INDEX: 36.79 KG/M2 | HEART RATE: 80 BPM

## 2024-04-08 DIAGNOSIS — Z00.00 PHYSICAL EXAM, ANNUAL: Primary | ICD-10-CM

## 2024-04-08 DIAGNOSIS — E55.9 VITAMIN D DEFICIENCY: ICD-10-CM

## 2024-04-08 DIAGNOSIS — G89.29 CHRONIC LEFT SHOULDER PAIN: ICD-10-CM

## 2024-04-08 DIAGNOSIS — R73.03 PRE-DIABETES: ICD-10-CM

## 2024-04-08 DIAGNOSIS — E78.00 HYPERCHOLESTEREMIA: ICD-10-CM

## 2024-04-08 DIAGNOSIS — I10 ESSENTIAL HYPERTENSION: ICD-10-CM

## 2024-04-08 DIAGNOSIS — E05.90 SUBCLINICAL HYPERTHYROIDISM: ICD-10-CM

## 2024-04-08 DIAGNOSIS — M25.512 CHRONIC LEFT SHOULDER PAIN: ICD-10-CM

## 2024-04-08 DIAGNOSIS — R97.20 ELEVATED PSA: ICD-10-CM

## 2024-04-08 PROCEDURE — 99213 OFFICE O/P EST LOW 20 MIN: CPT | Performed by: INTERNAL MEDICINE

## 2024-04-08 PROCEDURE — 99396 PREV VISIT EST AGE 40-64: CPT | Performed by: INTERNAL MEDICINE

## 2024-04-08 RX ORDER — AMLODIPINE BESYLATE 10 MG/1
10 TABLET ORAL DAILY
COMMUNITY
End: 2024-04-08 | Stop reason: SDUPTHER

## 2024-04-08 RX ORDER — AMLODIPINE BESYLATE 10 MG/1
10 TABLET ORAL DAILY
Qty: 90 TABLET | Refills: 1 | Status: SHIPPED | OUTPATIENT
Start: 2024-04-08

## 2024-04-08 NOTE — PATIENT INSTRUCTIONS
Patient was advised to continue present medications. discussed with the patient medications and laboratory data in detail.  Follow-up with me in 4 months or as advised.  If any blood test was ordered please do 1 week prior to next appointment unless advise to get earlier.  If you have any questions please call the office [phone]

## 2024-04-08 NOTE — PROGRESS NOTES
Assessment/Plan:    1. Physical exam, annual    2. Essential hypertension  -     amLODIPine (NORVASC) 10 mg tablet; Take 1 tablet (10 mg total) by mouth daily    3. Hypercholesteremia    4. Pre-diabetes    5. Vitamin D deficiency    6. Elevated PSA    7. Chronic left shoulder pain    8. BMI 36.0-36.9,adult  Assessment & Plan:  Patient  was advised to decrease portion size.  Advised to decrease carb, sugar, cholesterol intake.  Advised to exercise 3-5 times per week.  Advised to lose weight.      9. Subclinical hyperthyroidism    Discussion/summary/plan:    Blood pressure elevated so advised to increase amlodipine from 5 mg once a day to 10 mg once a day and low-salt diet.  Advised to exercise and lose weight.  Cholesterol 187, triglyceride 107, HDL 51,  he takes rosuvastatin 5 mg daily advised to continue present medication and low-cholesterol low carbs diet.  Hemoglobin A1c decreased from 6.2-6.0.  Advised to continue low sugar low-carb diet.  Vitamin D deficiency resolved last vitamin D level 63.  He takes vitamin D 2000 IU daily advised to continue same dose.  He was seen by urologist for elevated PSA had a prostate biopsy.  Advised to follow with the urologist.  For subclinical hyperthyroidism since he has been seen and followed by an endocrinologist.  He has a chronic left shoulder pain has been seen and followed by orthopedic specialist for that and presently on disability per patient.  Advised to follow-up with the orthopedic specialist.        Subjective: Patient presents for annual wellness exam as well as follow-up his medical problems.      Patient ID: Reid Jiang is a 59 y.o. male.    HPI  59-year-old male patient presents to follow-up his medical problems as well as annual wellness examination.  He denies any chest pain, shortness of breath, pain ever.  Denies any cough, fever, chills.  Denies any nausea vomiting diarrhea.  He was seen by urologist had a prostate biopsy.  He has been seen and  followed by orthopedic specialist for his chronic left shoulder pain.    The following portions of the patient's history were reviewed and updated as appropriate:     Past Medical History:  He has a past medical history of Acute pain of left shoulder (02/27/2023), BMI 33.0-33.9,adult (05/25/2023), BMI 36.0-36.9,adult (04/27/2021), BMI 37.0-37.9, adult (01/26/2021), Bradycardia, Bronchitis (01/05/2023), Burning sensation of lower extremity (05/25/2023), Chronic left shoulder pain (08/28/2023), Elevated PSA (04/27/2021), Hypercholesteremia (01/23/2021), Hyperglycemia (01/23/2021), Hyperplastic polyp of sigmoid colon (04/27/2021), Hypertension, Low TSH level (01/23/2021), Numbness and tingling of right leg (05/25/2023), Other insomnia (04/27/2021), Physical exam, annual (04/08/2024), Prediabetes (10/26/2021), Skin rash (04/26/2022), and Weight gain (01/23/2021).,  _______________________________________________________________________  Past Surgical History:   has a past surgical history that includes Hernia repair (Bilateral); Hemorroidectomy; Shoulder surgery (Left); Colonoscopy (2013); [place] tooth extraction; and Shoulder arthroscopy w/ rotator cuff repair (Left, 03/2023).,  _______________________________________________________________________  Family History:  family history includes Diabetes type II in his maternal uncle and maternal uncle; Dialysis in his maternal uncle; Hypertension in his father and mother; No Known Problems in his brother, daughter, maternal grandfather, maternal grandmother, paternal grandfather, paternal grandmother, paternal uncle, and sister; Other in his father; Prostate cancer in his family and father.,  _______________________________________________________________________  Social History:   reports that he has never smoked. He has been exposed to tobacco smoke. He has never used smokeless tobacco. He reports current alcohol use of about 4.0 standard drinks of alcohol per week. He  reports that he does not use drugs.,  _______________________________________________________________________  Allergies:  is allergic to penicillin g and penicillins..  _______________________________________________________________________  Current Outpatient Medications   Medication Sig Dispense Refill    amLODIPine (NORVASC) 10 mg tablet Take 1 tablet (10 mg total) by mouth daily 90 tablet 1    Blood Pressure Monitoring (Blood Pressure Kit) KIT Use daily Adult 1 each 0    Cholecalciferol (D3) 50 MCG (2000 UT) TABS       CINNAMON PO Take by mouth daily      Multiple Vitamin (multivitamin) capsule Take 1 capsule by mouth daily      pyridoxine (VITAMIN B6) 100 mg tablet Take 100 mg by mouth daily      rosuvastatin (CRESTOR) 5 mg tablet TAKE 1 TABLET (5 MG TOTAL) BY MOUTH DAILY. 90 tablet 1     No current facility-administered medications for this visit.     _______________________________________________________________________  Review of Systems   Constitutional:  Negative for chills and fever.   HENT:  Negative for congestion, ear pain, hearing loss, nosebleeds, sinus pain, sore throat and trouble swallowing.    Eyes:  Negative for discharge, redness and visual disturbance.   Respiratory:  Negative for cough, chest tightness and shortness of breath.    Cardiovascular:  Negative for chest pain and palpitations.   Gastrointestinal:  Negative for abdominal pain, blood in stool, constipation, diarrhea, nausea and vomiting.   Genitourinary:  Negative for dysuria, flank pain and hematuria.   Musculoskeletal:  Negative for arthralgias, myalgias and neck pain.   Skin:  Negative for color change and rash.   Neurological:  Negative for dizziness, speech difficulty, weakness and headaches.   Hematological:  Does not bruise/bleed easily.   Psychiatric/Behavioral:  Negative for agitation and behavioral problems.            Objective:  Vitals:    04/08/24 1058   BP: 144/90   BP Location: Left arm   Patient Position: Sitting  "  Cuff Size: Large   Pulse: 80   Resp: 16   Temp: (!) 97.4 °F (36.3 °C)   TempSrc: Temporal   SpO2: 98%   Weight: 117 kg (257 lb)   Height: 5' 10\" (1.778 m)     Body mass index is 36.88 kg/m².     Physical Exam  Vitals and nursing note reviewed.   Constitutional:       General: He is not in acute distress.     Appearance: Normal appearance.   HENT:      Head: Normocephalic and atraumatic.      Right Ear: Tympanic membrane, ear canal and external ear normal.      Left Ear: Tympanic membrane, ear canal and external ear normal.      Nose: Nose normal. No congestion.      Mouth/Throat:      Mouth: Mucous membranes are moist.      Pharynx: Oropharynx is clear.   Eyes:      General: No scleral icterus.        Right eye: No discharge.         Left eye: No discharge.      Extraocular Movements: Extraocular movements intact.      Conjunctiva/sclera: Conjunctivae normal.   Cardiovascular:      Rate and Rhythm: Normal rate and regular rhythm.      Pulses: Normal pulses.      Heart sounds: Normal heart sounds. No murmur heard.  Pulmonary:      Effort: Pulmonary effort is normal. No respiratory distress.      Breath sounds: Normal breath sounds. No wheezing.   Abdominal:      General: Bowel sounds are normal. There is no distension.      Palpations: Abdomen is soft.      Tenderness: There is no abdominal tenderness.   Musculoskeletal:         General: Tenderness (Left shoulder) present.      Cervical back: Normal range of motion and neck supple. No muscular tenderness.      Right lower leg: No edema.      Left lower leg: No edema.   Skin:     General: Skin is warm.      Findings: No rash.   Neurological:      General: No focal deficit present.      Mental Status: He is alert and oriented to person, place, and time.      Motor: No weakness.   Psychiatric:         Mood and Affect: Mood normal.         Behavior: Behavior normal.           "

## 2024-04-09 ENCOUNTER — OFFICE VISIT (OUTPATIENT)
Dept: CARDIOLOGY CLINIC | Facility: CLINIC | Age: 59
End: 2024-04-09
Payer: COMMERCIAL

## 2024-04-09 VITALS
WEIGHT: 258 LBS | OXYGEN SATURATION: 99 % | HEART RATE: 66 BPM | BODY MASS INDEX: 37.02 KG/M2 | SYSTOLIC BLOOD PRESSURE: 138 MMHG | DIASTOLIC BLOOD PRESSURE: 90 MMHG

## 2024-04-09 DIAGNOSIS — E78.00 HYPERCHOLESTEREMIA: ICD-10-CM

## 2024-04-09 DIAGNOSIS — R00.1 BRADYCARDIA: ICD-10-CM

## 2024-04-09 DIAGNOSIS — I10 ESSENTIAL HYPERTENSION: Primary | ICD-10-CM

## 2024-04-09 PROCEDURE — 93000 ELECTROCARDIOGRAM COMPLETE: CPT | Performed by: INTERNAL MEDICINE

## 2024-04-09 PROCEDURE — 99212 OFFICE O/P EST SF 10 MIN: CPT | Performed by: INTERNAL MEDICINE

## 2024-04-09 NOTE — PROGRESS NOTES
Cardiology Follow Up    Reid Jiang  1965  [id number]  CARDIOVASC PHYSICIAN  [address] [facility] [facility] [address]  [phone]  [phone]    1. Essential hypertension  POCT ECG      2. Hypercholesteremia        3. Bradycardia            Interval History: Cardiology follow-up.  Patient continues to do well from a cardiac point of view denies any significant chest pain or dyspnea.  He is somewhat active.  No regular exercise, he states he he is on disability because of shoulder problems.  He still continues to struggle with his weight.  States being compliant with low-cholesterol diet, lipids on 2024, total cholesterol was 187.  HDL 51, LDL of 115.  .  On low intensity statin therapy, acceptable control..  Denies syncope or presyncope.  States been compliant with a low-sodium diet, blood pressure has been well-controlled, he states ambulatory blood pressures at home under 120/80 range..    Patient Active Problem List   Diagnosis    Bradycardia    Weight gain    Hypercholesteremia    Low TSH level    BMI 37.0-37.9, adult    Hyperplastic polyp of sigmoid colon    BMI 36.0-36.9,adult    Elevated PSA    Other insomnia    Thyromegaly    Pre-diabetes    Subclinical hyperthyroidism    Vitamin D deficiency    Prediabetes    Essential hypertension    Multiple thyroid nodules    Skin rash    Acute low back pain    Spasm of muscle of lower back    Bronchitis    Acute pain of left shoulder    Annual physical exam    Numbness and tingling of right leg    Burning sensation of lower extremity    BMI 33.0-33.9,adult    Chronic left shoulder pain    Physical exam, annual     Past Medical History:   Diagnosis Date    Acute pain of left shoulder 02/27/2023    BMI 33.0-33.9,adult 05/25/2023    BMI 36.0-36.9,adult 04/27/2021    BMI 37.0-37.9, adult 01/26/2021    Bradycardia     Bronchitis 01/05/2023    Burning sensation of lower extremity 05/25/2023    Chronic left shoulder pain 08/28/2023     Elevated PSA 04/27/2021    Hypercholesteremia 01/23/2021    Hyperglycemia 01/23/2021    Hyperplastic polyp of sigmoid colon 04/27/2021    Hypertension     Low TSH level 01/23/2021    Numbness and tingling of right leg 05/25/2023    Other insomnia 04/27/2021    Physical exam, annual 04/08/2024    Prediabetes 10/26/2021    Skin rash 04/26/2022    Weight gain 01/23/2021     Social History     Socioeconomic History    Marital status: Single     Spouse name: Not on file    Number of children: 1    Years of education: Not on file    Highest education level: Not on file   Occupational History     Employer: [employer]   Tobacco Use    Smoking status: Never     Passive exposure: Past    Smokeless tobacco: Never   Vaping Use    Vaping status: Never Used   Substance and Sexual Activity    Alcohol use: Yes     Alcohol/week: 4.0 standard drinks of alcohol     Types: 4 Standard drinks or equivalent per week     Comment: Occasional     Drug use: Never     Comment: No dug use - As per [employer]    Sexual activity: Yes   Other Topics Concern    Not on file   Social History Narrative    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    Lives with his mom        Sees urology in [place]; last seen 2013 in [place], [place]    - As per [employer]     Social Determinants of Health     Financial Resource Strain: Not on file   Food Insecurity: Not on file   Transportation Needs: Not on file   Physical Activity: Not on file   Stress: Not on file   Social Connections: Not on file   Intimate Partner Violence: Not on file   Housing Stability: Not on file      Family History   Problem Relation Age of Onset    Hypertension Mother     Other Father         pacemaker     Hypertension Father     Prostate cancer Father         diagnosed in his 60s     No Known Problems Sister     No Known Problems Brother     Diabetes type II Maternal Uncle     Diabetes type II Maternal Uncle     Dialysis Maternal Uncle     No Known Problems Paternal  Uncle     No Known Problems Maternal Grandmother     No Known Problems Maternal Grandfather     No Known Problems Paternal Grandmother     No Known Problems Paternal Grandfather     Prostate cancer Family     No Known Problems Daughter      Past Surgical History:   Procedure Laterality Date    COLONOSCOPY  2013    JONATHAN Yanez     HEMORROIDECTOMY      [id number]     HERNIA REPAIR Bilateral     In childhood     SHOULDER ARTHROSCOPY W/ ROTATOR CUFF REPAIR Left 03/2023    SHOULDER SURGERY Left     Pain - impingement    WISDOM TOOTH EXTRACTION         Current Outpatient Medications:     amLODIPine (NORVASC) 10 mg tablet, Take 1 tablet (10 mg total) by mouth daily, Disp: 90 tablet, Rfl: 1    Cholecalciferol (D3) 50 MCG (2000 UT) TABS, , Disp: , Rfl:     CINNAMON PO, Take by mouth daily, Disp: , Rfl:     Multiple Vitamin (multivitamin) capsule, Take 1 capsule by mouth daily, Disp: , Rfl:     pyridoxine (VITAMIN B6) 100 mg tablet, Take 100 mg by mouth daily, Disp: , Rfl:     rosuvastatin (CRESTOR) 5 mg tablet, TAKE 1 TABLET (5 MG TOTAL) BY MOUTH DAILY., Disp: 90 tablet, Rfl: 1    Blood Pressure Monitoring (Blood Pressure Kit) KIT, Use daily Adult (Patient not taking: Reported on 4/9/2024), Disp: 1 each, Rfl: 0  Allergies   Allergen Reactions    Penicillin G Swelling    Penicillins Swelling       Labs:  Orders Only on 04/02/2024   Component Date Value    Hemoglobin 04/02/2024 14.8     HCT 04/02/2024 42.9     White Blood Cell Count 04/02/2024 7.5     Red Blood Cell Count 04/02/2024 4.90     Platelet Count 04/02/2024 383 (H)     SL AMB MPV 04/02/2024 7.5     MCV 04/02/2024 88     MCH 04/02/2024 30.3     MCHC 04/02/2024 34.6     RDW 04/02/2024 14.7     Differential Type 04/02/2024 AUTO     Neutrophils (Absolute) 04/02/2024 3.9     Lymphocytes (Absolute) 04/02/2024 2.9     Monocytes (Absolute) 04/02/2024 0.6     Eosinophils (Absolute) 04/02/2024 0.1     Basophils ABS 04/02/2024 0.0     Neutrophils 04/02/2024 53     Lymphocytes  04/02/2024 38     Monocytes 04/02/2024 8     Eosinophils 04/02/2024 1     Basophils PCT 04/02/2024 0     Glucose, Random 04/02/2024 100 (H)     BUN 04/02/2024 8     Creatinine 04/02/2024 0.99     Sodium 04/02/2024 139     Potassium 04/02/2024 4.0     Chloride 04/02/2024 101     CO2 04/02/2024 27     Calcium 04/02/2024 9.3     Alkaline Phosphatase 04/02/2024 83     Albumin 04/02/2024 4.3     TOTAL BILIRUBIN 04/02/2024 0.5     Protein, Total 04/02/2024 7.3     AST 04/02/2024 17     ALT 04/02/2024 14     ANION GAP 04/02/2024 11     eGFR 04/02/2024 88     Comment 04/02/2024 (Note)     Cholesterol, Total 04/02/2024 187     Triglycerides 04/02/2024 107     HDL Cholesterol 04/02/2024 51     Non HDL Chol. (LDL+VLDL) 04/02/2024 136     LDL Calculated 04/02/2024 115     Chol HDLC Ratio 04/02/2024 3.7     Hemoglobin A1C 04/02/2024 6.0 (H)     Estimated Average Glucose 04/02/2024 126    Procedure visit on 03/19/2024   Component Date Value    Case Report 03/19/2024                      Value:Surgical Pathology Report                         Case: [id number]                                  Authorizing Provider:  Av Benitez MD          Collected:           03/19/2024 0922              Ordering Location:     [facility]        Received:            03/19/2024 [facility]                                     [facility] [place]                                                               Pathologist:           Thiago Morrell MD                                                         Specimens:   A) - Prostate, rlb                                                                                  B) - Prostate, rlm                                                                                  C) - Prostate, rla                                                                                  D) - Prostate, rb                                                                                   E) - Prostate, rm                                                                                                              F) - Prostate, ra                                                                                   G) - Prostate, llb                                                                                  H) - Prostate, llm                                                                                  I) - Prostate, lla                                                                                  J) - Prostate, lb                                                                                   K) - Prostate, lm                                                                                   L) - Prostate, la                                                                          Final Diagnosis 03/19/2024                      Value:This result contains rich text formatting which cannot be displayed here.    Note 03/19/2024                      Value:This result contains rich text formatting which cannot be displayed here.    Additional Information 03/19/2024                      Value:This result contains rich text formatting which cannot be displayed here.    Gross Description 03/19/2024                      Value:This result contains rich text formatting which cannot be displayed here.     Imaging: No results found.    Review of Systems:  Review of Systems   Constitutional:  Negative for activity change and fatigue.   Respiratory:  Negative for apnea, shortness of breath, wheezing and stridor.    Cardiovascular:  Negative for chest pain, palpitations and leg swelling.   Neurological:  Negative for syncope.   Psychiatric/Behavioral:  Negative for sleep disturbance.        Physical Exam:  Physical Exam  Vitals reviewed.   Constitutional:       General: He is not in acute distress.     Appearance: Normal appearance. He is obese. He is not ill-appearing, toxic-appearing or diaphoretic.   Neck:      Vascular: No carotid bruit.    Cardiovascular:      Rate and Rhythm: Regular rhythm. Bradycardia present.      Pulses: Normal pulses.      Heart sounds: Normal heart sounds. No murmur heard.     No friction rub. No gallop.   Pulmonary:      Effort: Pulmonary effort is normal. No respiratory distress.      Breath sounds: Normal breath sounds. No stridor. No wheezing, rhonchi or rales.   Musculoskeletal:      Right lower leg: No edema.      Left lower leg: No edema.   Skin:     General: Skin is warm and dry.      Capillary Refill: Capillary refill takes less than 2 seconds.   Neurological:      Mental Status: He is alert and oriented to person, place, and time.   Psychiatric:         Mood and Affect: Mood normal.         Discussion/Summary:    Hypertension, essential, well controlled oncurrent medical regimen, he has been hypertensive for over 2 decades.  Previously on beta-blocker this was discontinue because of resting bradycardia, Holter monitor  revealed resting sinus bradycardia average of 59, ranging from , he had no symptoms or arrhythmias bradyarrhythmias or tachyarrhythmias.  Stress test at that time, he did 8 minutes of Sanjay protocol, achieving target heart rate, there was no EKG criteria ischemia or symptoms or arrhythmias.  Present time favor no interventions.  Regular exercise and weight management recommended as well as continue ambulatory blood pressure readings       This note was completed in part utilizing [employer] direct voice recognition software.   Grammatical errors, random word insertion, spelling mistakes, and incomplete sentences may be an occasional consequence of the system secondary to software limitations, ambient noise and hardware issues. At the time of dictation, efforts were made to edit, clarify and /or correct errors.  Please read the chart carefully and recognize, using context, where substitutions have occurred.  If you have any questions or concerns about the context, text or information  contained within the body of this dictation, please contact myself, the provider, for further clarification.

## 2024-04-22 ENCOUNTER — LAB REQUISITION (OUTPATIENT)
Dept: LAB | Facility: HOSPITAL | Age: 59
End: 2024-04-22

## 2024-04-22 DIAGNOSIS — C61 MALIGNANT NEOPLASM OF PROSTATE (HCC): ICD-10-CM

## 2024-04-22 LAB — SCAN RESULT: NORMAL

## 2024-04-24 ENCOUNTER — TELEPHONE (OUTPATIENT)
Dept: UROLOGY | Facility: AMBULATORY SURGERY CENTER | Age: 59
End: 2024-04-24

## 2024-04-24 NOTE — TELEPHONE ENCOUNTER
I called pt and discussed his biopsy showing GG1 prostate cancer in 1 core <5% but Decipher showing higher risk of 0.64.    We discussed the implications of his low risk prostate cancer on biopsy but high risk genetic status.  Made the analogy of women with no known breast cancer but BRCA positivity who choose mastectomy which is somewhat the case here but not as strong the case as for women with BRCA positivity.    I will see the patient in person in clinic in 2 weeks and we will discuss further the role for active surveillance versus consideration of treatment.

## 2024-05-13 ENCOUNTER — TELEPHONE (OUTPATIENT)
Dept: UROLOGY | Facility: CLINIC | Age: 59
End: 2024-05-13

## 2024-05-13 ENCOUNTER — OFFICE VISIT (OUTPATIENT)
Dept: UROLOGY | Facility: CLINIC | Age: 59
End: 2024-05-13
Payer: COMMERCIAL

## 2024-05-13 VITALS
WEIGHT: 258 LBS | HEART RATE: 66 BPM | BODY MASS INDEX: 36.94 KG/M2 | OXYGEN SATURATION: 97 % | DIASTOLIC BLOOD PRESSURE: 76 MMHG | SYSTOLIC BLOOD PRESSURE: 136 MMHG | HEIGHT: 70 IN

## 2024-05-13 DIAGNOSIS — C61 PROSTATE CANCER (HCC): Primary | ICD-10-CM

## 2024-05-13 PROBLEM — R97.20 ELEVATED PSA: Status: RESOLVED | Noted: 2021-04-27 | Resolved: 2024-05-13

## 2024-05-13 PROCEDURE — 99214 OFFICE O/P EST MOD 30 MIN: CPT | Performed by: UROLOGY

## 2024-05-13 NOTE — TELEPHONE ENCOUNTER
At checkout follow up with Emmanuel on August 26th and waiting for pt to schedule MRI          Provider note:  Return for PSA and prostate MRI in August and fu with emmanuel after.

## 2024-05-13 NOTE — PROGRESS NOTES
"Assessment/Plan:    Prostate cancer (HCC)  The patient has low risk prostate cancer but a higher risk decipher score.  We had a long conversation about this over the phone and then again today.  We discussed the options for active surveillance versus treatment and the rationale for each including his young age and the potential for his cancer to progress over time based on genetic risk.      At this point the patient wants to continue active surveillance which is reasonable.  He understands I recommend a \"shorter leash\" which includes more frequent biopsies and a lower threshold to consider treatment.    Will plan for PSA and MRI in 6 months and follow-up with me after.          Subjective:      Patient ID: Reid Jiang is a 59 y.o. male.    HPI    59-year-old -American man with history of elevated PSA and family history of prostate cancer.      His PSA has been as high as 5 and his last PSA was 4.21. Current PSA 5.18. MRI in 2022 was PI-RADS 2 with a 65 g prostate.  The patient underwent an office-based biopsy in March 2024 in which his prostate measured 75 g.  This returned with 1 core of grade group 1 prostate cancer in the left lateral base.  We discussed options and aiming for active surveillance obtained a decipher score returned at 0.64 which is considered high risk.  He comes in today to discuss further.     He is a  and apparently there has been some relationship established between the foam often used and different medical issues.    Past Surgical History:   Procedure Laterality Date    COLONOSCOPY  2013    JONATHAN Yanez     HEMORROIDECTOMY      [id number]     HERNIA REPAIR Bilateral     In childhood     SHOULDER ARTHROSCOPY W/ ROTATOR CUFF REPAIR Left 03/2023    SHOULDER SURGERY Left     Pain - impingement    WISDOM TOOTH EXTRACTION          Past Medical History:   Diagnosis Date    Acute pain of left shoulder 02/27/2023    BMI 33.0-33.9,adult 05/25/2023    BMI 36.0-36.9,adult 04/27/2021 "    BMI 37.0-37.9, adult 01/26/2021    Bradycardia     Bronchitis 01/05/2023    Burning sensation of lower extremity 05/25/2023    Chronic left shoulder pain 08/28/2023    Elevated PSA 04/27/2021    Hypercholesteremia 01/23/2021    Hyperglycemia 01/23/2021    Hyperplastic polyp of sigmoid colon 04/27/2021    Hypertension     Low TSH level 01/23/2021    Numbness and tingling of right leg 05/25/2023    Other insomnia 04/27/2021    Physical exam, annual 04/08/2024    Prediabetes 10/26/2021    Skin rash 04/26/2022    Weight gain 01/23/2021        AUA SYMPTOM SCORE      Flowsheet Row Most Recent Value   AUA SYMPTOM SCORE    How often have you had a sensation of not emptying your bladder completely after you finished urinating? 1 (P)    How often have you had to urinate again less than two hours after you finished urinating? 1 (P)    How often have you found you stopped and started again several times when you urinate? 0 (P)    How often have you found it difficult to postpone urination? 0 (P)    How often have you had a weak urinary stream? 0 (P)    How often have you had to push or strain to begin urination? 0 (P)    How many times did you most typically get up to urinate from the time you went to bed at night until the time you got up in the morning? 1 (P)    Quality of Life: If you were to spend the rest of your life with your urinary condition just the way it is now, how would you feel about that? 1 (P)    AUA SYMPTOM SCORE 3 (P)              Review of Systems   Constitutional:  Negative for chills and fever.   HENT:  Negative for ear pain and sore throat.    Eyes:  Negative for pain and visual disturbance.   Respiratory:  Negative for cough and shortness of breath.    Cardiovascular:  Negative for chest pain and palpitations.   Gastrointestinal:  Negative for abdominal pain and vomiting.   Genitourinary:  Negative for dysuria and hematuria.   Musculoskeletal:  Negative for arthralgias and back pain.   Skin:  Negative  "for color change and rash.   Neurological:  Negative for seizures and syncope.   All other systems reviewed and are negative.        Objective:      /76 (BP Location: Left arm, Patient Position: Sitting, Cuff Size: Standard)   Pulse 66   Ht 5' 10\" (1.778 m)   Wt 117 kg (258 lb)   SpO2 97%   BMI 37.02 kg/m²     No results found for: \"PSA\"       Physical Exam  Vitals reviewed.   Constitutional:       Appearance: Normal appearance. He is normal weight.   HENT:      Head: Normocephalic and atraumatic.   Eyes:      Pupils: Pupils are equal, round, and reactive to light.   Abdominal:      General: Abdomen is flat.   Neurological:      General: No focal deficit present.      Mental Status: He is alert and oriented to person, place, and time.   Psychiatric:         Mood and Affect: Mood normal.         Thought Content: Thought content normal.           Orders  Orders Placed This Encounter   Procedures    MRI prostate multiparametric wo w contrast     Standing Status:   Future     Standing Expiration Date:   5/13/2028     Scheduling Instructions:      There is no preparation for this test. Please leave your jewelry and valuables at home, wedding rings are the exception. All patients will be required to change into a hospital gown and pants.  Street clothes are not permitted in the MRI.  Magnetic nail polish must be removed prior to arrival for your test. Please bring your insurance cards, a form of photo ID and a list of your medications with you. Arrive 15 minutes prior to your appointment time in order to register. Please bring any prior CT or MRI studies of this area that were not performed at a [facility] facility.            To schedule this appointment, please contact Central Scheduling at [phone].            Prior to your appointment, please make sure you complete the MRI Screening Form when you e-Check in for your appointment. This will be available starting 7 days before your appointment in " [employer]. You may receive an e-mail with an activation code if you do not have a [employer] account. If you do not have access to a device, we will complete your screening at your appointment.     Order Specific Question:   Does this procedure require the 3T MRI at [facility]?     Answer:   Yes     Order Specific Question:   Release to patient through [facility]     Answer:   Immediate     Order Specific Question:   Is order priority selected as STAT?     Answer:   No     Order Specific Question:   Reason for Exam     Answer:   pt with known low risk prostate cancer on active surveillance     Order Specific Question:   Reason for Exam:     Answer:   pt with known low risk prostate cancer on active surveillance     Order Specific Question:   What is the patient's sedation requirement?     Answer:   No Sedation    PSA Total, Diagnostic     Standing Status:   Future     Number of Occurrences:   1     Standing Expiration Date:   5/13/2025

## 2024-05-13 NOTE — ASSESSMENT & PLAN NOTE
"The patient has low risk prostate cancer but a higher risk decipher score.  We had a long conversation about this over the phone and then again today.  We discussed the options for active surveillance versus treatment and the rationale for each including his young age and the potential for his cancer to progress over time based on genetic risk.      At this point the patient wants to continue active surveillance which is reasonable.  He understands I recommend a \"shorter leash\" which includes more frequent biopsies and a lower threshold to consider treatment.    Will plan for PSA and MRI in 6 months and follow-up with me after.  "

## 2024-06-10 ENCOUNTER — HOSPITAL ENCOUNTER (OUTPATIENT)
Dept: RADIOLOGY | Age: 59
Discharge: HOME | End: 2024-06-10
Payer: COMMERCIAL

## 2024-06-10 DIAGNOSIS — C61 PROSTATE CANCER (HCC): ICD-10-CM

## 2024-06-10 PROCEDURE — 72197 MRI PELVIS W/O & W/DYE: CPT

## 2024-06-10 PROCEDURE — 76377 3D RENDER W/INTRP POSTPROCES: CPT

## 2024-06-10 PROCEDURE — A9585 GADOBUTROL INJECTION: HCPCS | Performed by: UROLOGY

## 2024-06-10 RX ORDER — GADOBUTROL 604.72 MG/ML
12 INJECTION INTRAVENOUS
Status: COMPLETED | OUTPATIENT
Start: 2024-06-10 | End: 2024-06-10

## 2024-06-10 RX ADMIN — GADOBUTROL 12 ML: 604.72 INJECTION INTRAVENOUS at 14:36

## 2024-07-22 ENCOUNTER — TELEPHONE (OUTPATIENT)
Age: 59
End: 2024-07-22

## 2024-07-22 LAB
25(OH)D3+25(OH)D2 SERPL-MCNC: 33 NG/ML (ref 30–100)
T3 SERPL-MCNC: 1.21 NG/ML (ref 0.87–1.78)
T4 FREE SERPL-MCNC: 1.07 NG/DL (ref 0.61–1.12)
TSH SERPL-ACNC: 0.2 UIU/ML (ref 0.45–5.33)

## 2024-07-22 NOTE — TELEPHONE ENCOUNTER
Evelyn from [facility] labs calling in regards to patient getting lab work. Expected date is listed as 8/6. Patient has an appt on 8/5. She asked if it was okay to draw the labs. I let her know it was okay to draw the labs. Since it is  2 weeks away for his next appt.

## 2024-07-29 ENCOUNTER — HOSPITAL ENCOUNTER (OUTPATIENT)
Dept: ULTRASOUND IMAGING | Facility: HOSPITAL | Age: 59
Discharge: HOME | End: 2024-07-29
Attending: INTERNAL MEDICINE
Payer: COMMERCIAL

## 2024-07-29 DIAGNOSIS — E55.9 VITAMIN D DEFICIENCY: ICD-10-CM

## 2024-07-29 DIAGNOSIS — R73.03 PRE-DIABETES: ICD-10-CM

## 2024-07-29 DIAGNOSIS — E05.90 SUBCLINICAL HYPERTHYROIDISM: ICD-10-CM

## 2024-07-29 DIAGNOSIS — E04.2 MULTIPLE THYROID NODULES: ICD-10-CM

## 2024-07-29 PROCEDURE — 76536 US EXAM OF HEAD AND NECK: CPT

## 2024-08-05 ENCOUNTER — OFFICE VISIT (OUTPATIENT)
Dept: ENDOCRINOLOGY | Facility: CLINIC | Age: 59
End: 2024-08-05
Payer: COMMERCIAL

## 2024-08-05 VITALS
OXYGEN SATURATION: 95 % | SYSTOLIC BLOOD PRESSURE: 120 MMHG | HEART RATE: 66 BPM | DIASTOLIC BLOOD PRESSURE: 80 MMHG | HEIGHT: 70 IN | BODY MASS INDEX: 35.65 KG/M2 | WEIGHT: 249 LBS

## 2024-08-05 DIAGNOSIS — E55.9 VITAMIN D DEFICIENCY: ICD-10-CM

## 2024-08-05 DIAGNOSIS — E04.2 MULTIPLE THYROID NODULES: Primary | ICD-10-CM

## 2024-08-05 DIAGNOSIS — E05.90 SUBCLINICAL HYPERTHYROIDISM: ICD-10-CM

## 2024-08-05 DIAGNOSIS — R73.03 PRE-DIABETES: ICD-10-CM

## 2024-08-05 PROCEDURE — 99215 OFFICE O/P EST HI 40 MIN: CPT | Performed by: INTERNAL MEDICINE

## 2024-08-05 NOTE — PROGRESS NOTES
" Reid Jiang 59 y.o. male MRN: [id number]    Encounter: [id number]      Assessment & Plan     Subclinical hyperthyroidism  Multiple thyroid nodules  Clinically euthyroid, thyroid function tests stable   2 of the thyroid nodules have increased significantly in size on the most recent ultrasound 7/2024  -Will get thyroid uptake and scan to assess for hyperfunctional thyroid nodules.  If the above 2 nodules are not hyperfunctional, we will proceed with FNA biopsy.    3.  History of vitamin D deficiency-recent levels within normal limits, continue supplementation    4. Pre-diabetes -not on medication.  Continue healthy lifestyle modifications    I have spent a total time of 40 minutes in caring for this patient on the day of the visit/encounter including Diagnostic results, Prognosis, Risks and benefits of tx options, Instructions for management, Documenting in the medical record, Reviewing / ordering tests, medicine, procedures  , and Obtaining or reviewing history  .      CC:  thyroid nodules, subclinical hyperthyroidism     History of Present Illness     HPI:  Reid Jiang is a 59 y.o. male who is here for a follow-up of subclinical hyperthyroidism, thyroid nodules. Also has h/o vitamin D deficiency, pre-diabtes , prostate cancer     Last seen in 2/2024  Per my prior notes   \"Initially noted to have suppressed TSH in 12/2020, had insomnia but no other symptoms of hyperthyroidism .  In follow-up, symptoms improved however continues to have subclinical hyperthyroidism on labs, was decided to monitor  TSI, TPO, Tg Antibody negative      [facility] thyroid 9/2022 - stable nodules   [facility] 8/2023: stable nodules\"     Ultrasound thyroid 7/2024     Lost 12 lbs since the last visit; intentional eating better.   No dedicated exercise, occasional active at work   Energy levels ate good   Sleep is variable, in the past has done shift work.   Anxiety due to diagnosis of prostate cancer; lost multiple family members in the last 1 " year  No other symptoms of low or high thyroidism    Patient has worked as a  in the , as well as non .  History of exposure to a AFF( foam) which has been associated with higher risk of multiple malignancies including thyroid carcinoma.   Was also in [employer] (h/o nuclear accident in 1958) in [facility] for 3 years, training/practices on site.     Taking D3 supplementation     Pre-diabetes -not on medication.    All other systems were reviewed and are negative.       Review of Systems    Historical Information   Past Medical History:   Diagnosis Date    Acute pain of left shoulder 02/27/2023    BMI 33.0-33.9,adult 05/25/2023    BMI 36.0-36.9,adult 04/27/2021    BMI 37.0-37.9, adult 01/26/2021    Bradycardia     Bronchitis 01/05/2023    Burning sensation of lower extremity 05/25/2023    Chronic left shoulder pain 08/28/2023    Elevated PSA 04/27/2021    Hypercholesteremia 01/23/2021    Hyperglycemia 01/23/2021    Hyperplastic polyp of sigmoid colon 04/27/2021    Hypertension     Low TSH level 01/23/2021    Numbness and tingling of right leg 05/25/2023    Other insomnia 04/27/2021    Physical exam, annual 04/08/2024    Prediabetes 10/26/2021    Skin rash 04/26/2022    Weight gain 01/23/2021     Past Surgical History:   Procedure Laterality Date    COLONOSCOPY  2013    JONATHAN Yanez     HEMORROIDECTOMY      [id number]     HERNIA REPAIR Bilateral     In childhood     SHOULDER ARTHROSCOPY W/ ROTATOR CUFF REPAIR Left 03/2023    SHOULDER SURGERY Left     Pain - impingement    WISDOM TOOTH EXTRACTION       Social History   Social History     Substance and Sexual Activity   Alcohol Use Yes    Alcohol/week: 4.0 standard drinks of alcohol    Types: 4 Standard drinks or equivalent per week    Comment: Occasional      Social History     Substance and Sexual Activity   Drug Use Never    Comment: No dug use - As per [employer]     Social History     Tobacco Use   Smoking Status Never     "Passive exposure: Past   Smokeless Tobacco Never     Family History:   Family History   Problem Relation Age of Onset    Hypertension Mother     Other Father         pacemaker     Hypertension Father     Prostate cancer Father         diagnosed in his 60s     No Known Problems Sister     No Known Problems Brother     Diabetes type II Maternal Uncle     Diabetes type II Maternal Uncle     Dialysis Maternal Uncle     No Known Problems Paternal Uncle     No Known Problems Maternal Grandmother     No Known Problems Maternal Grandfather     No Known Problems Paternal Grandmother     No Known Problems Paternal Grandfather     Prostate cancer Family     No Known Problems Daughter        Meds/Allergies   Current Outpatient Medications   Medication Sig Dispense Refill    amLODIPine (NORVASC) 10 mg tablet Take 1 tablet (10 mg total) by mouth daily 90 tablet 1    Blood Pressure Monitoring (Blood Pressure Kit) KIT Use daily Adult (Patient not taking: Reported on 4/9/2024) 1 each 0    Cholecalciferol (D3) 50 MCG (2000 UT) TABS       CINNAMON PO Take by mouth daily      pyridoxine (VITAMIN B6) 100 mg tablet Take 100 mg by mouth daily      rosuvastatin (CRESTOR) 5 mg tablet TAKE 1 TABLET (5 MG TOTAL) BY MOUTH DAILY. 90 tablet 1     No current facility-administered medications for this visit.     Allergies   Allergen Reactions    Penicillins Swelling    Penicillin G Swelling and Rash       Objective   Vitals: Blood pressure 120/80, pulse 66, height 5' 10\" (1.778 m), weight 113 kg (249 lb), SpO2 95%.    Physical Exam  Constitutional:       General: He is not in acute distress.     Appearance: He is well-developed. He is not diaphoretic.   HENT:      Head: Normocephalic and atraumatic.   Eyes:      Conjunctiva/sclera: Conjunctivae normal.   Neck:      Comments: Thyromegaly, nodular on palpation, soft, nodules not fixed to the overlying skin or surrounding structures  Cardiovascular:      Rate and Rhythm: Normal rate and regular " rhythm.      Heart sounds: Normal heart sounds. No murmur heard.  Pulmonary:      Effort: Pulmonary effort is normal. No respiratory distress.      Breath sounds: Normal breath sounds. No wheezing.   Abdominal:      General: There is no distension.      Palpations: Abdomen is soft.      Tenderness: There is no abdominal tenderness. There is no guarding.   Musculoskeletal:      Cervical back: Normal range of motion and neck supple.   Skin:     General: Skin is warm and dry.      Findings: No erythema or rash.   Neurological:      Mental Status: He is alert and oriented to person, place, and time.      Deep Tendon Reflexes: Reflexes normal.      Comments: No tremors on the outstretched arms      Psychiatric:         Behavior: Behavior normal.         Thought Content: Thought content normal.         The history was obtained from the review of the chart, patient.    Lab Results:   Lab Results   Component Value Date/Time    FREE T4 0.85 01/30/2024 12:19 PM    Free t4 1.07 07/22/2024 12:33 PM     Lab Results   Component Value Date    WBC 7.5 04/02/2024    HGB 14.8 04/02/2024    HCT 42.9 04/02/2024    MCV 88 04/02/2024     (H) 04/02/2024     Lab Results   Component Value Date    CREATININE 0.99 04/02/2024    BUN 8 04/02/2024    K 4.0 04/02/2024     04/02/2024    CO2 27 04/02/2024     Component      Latest Ref Rng 8/23/2022 2/7/2023 8/22/2023 4/2/2024   Hemoglobin A1C      <5.7 % 5.9 (E) 6.2 (H) (E) 6.2 (H) (E) 6.0 (H)    Hemoglobin A1C        6.2 (E) 6.2 (E)       Legend:  (H) High  (E) External lab result  Component      Latest Ref Rng 1/30/2024 4/2/2024 7/22/2024   TSH, POC      0.45 - 5.33 uIU/mL 0.20 (L) (E)  0.20 (L)    FREE T4      0.61 - 1.12 ng/dL 0.85 (E)  1.07    25-Hydroxy, Vitamin D      30 - 100 ng/mL   33    T3, Total      0.87 - 1.78 ng/mL   1.21           Imaging Studies:   Results for orders placed during the hospital encounter of 07/29/24    US thyroid    Impression  No nodule meets current  "ACR criteria for requiring biopsy but follow-up ultrasound is recommended in 1 year.    Reference: ACR Thyroid Imaging, Reporting and Data System (TI-RADS): White Paper of the ACR TI-RADS Committee. J AM Ramiro Radiol 2017;14:587-595. Additional recommendations based on American Thyroid Association 2015 guidelines.      Workstation performed: [id number]      I have personally reviewed pertinent reports.  in PACS.     Portions of the record may have been created with voice recognition software. Occasional wrong word or \"sound a like\" substitutions may have occurred due to the inherent limitations of voice recognition software. Read the chart carefully and recognize, using context, where substitutions have occurred.    "

## 2024-08-07 ENCOUNTER — TELEPHONE (OUTPATIENT)
Age: 59
End: 2024-08-07

## 2024-08-07 NOTE — TELEPHONE ENCOUNTER
Patient wants to speak to Dr. Benitez to go over more information about the procedure she wants him to have done.

## 2024-08-09 NOTE — TELEPHONE ENCOUNTER
Called and spoke to Mr. Jiang, answered questions that he had regarding the thyroid uptake and scan.

## 2024-08-13 ENCOUNTER — OFFICE VISIT (OUTPATIENT)
Dept: INTERNAL MEDICINE CLINIC | Facility: CLINIC | Age: 59
End: 2024-08-13
Payer: COMMERCIAL

## 2024-08-13 VITALS
WEIGHT: 246 LBS | SYSTOLIC BLOOD PRESSURE: 120 MMHG | HEART RATE: 68 BPM | TEMPERATURE: 97.9 F | HEIGHT: 70 IN | BODY MASS INDEX: 35.22 KG/M2 | RESPIRATION RATE: 18 BRPM | OXYGEN SATURATION: 98 % | DIASTOLIC BLOOD PRESSURE: 82 MMHG

## 2024-08-13 DIAGNOSIS — Z79.899 HIGH RISK MEDICATION USE: ICD-10-CM

## 2024-08-13 DIAGNOSIS — C61 PROSTATE CANCER (HCC): ICD-10-CM

## 2024-08-13 DIAGNOSIS — E78.00 HYPERCHOLESTEREMIA: ICD-10-CM

## 2024-08-13 DIAGNOSIS — E05.90 SUBCLINICAL HYPERTHYROIDISM: ICD-10-CM

## 2024-08-13 DIAGNOSIS — E55.9 VITAMIN D DEFICIENCY: ICD-10-CM

## 2024-08-13 DIAGNOSIS — E04.2 MULTIPLE THYROID NODULES: ICD-10-CM

## 2024-08-13 DIAGNOSIS — R73.03 PREDIABETES: ICD-10-CM

## 2024-08-13 DIAGNOSIS — I10 ESSENTIAL HYPERTENSION: Primary | ICD-10-CM

## 2024-08-13 DIAGNOSIS — D75.839 THROMBOCYTOSIS: ICD-10-CM

## 2024-08-13 PROCEDURE — 99213 OFFICE O/P EST LOW 20 MIN: CPT | Performed by: INTERNAL MEDICINE

## 2024-08-13 NOTE — PATIENT INSTRUCTIONS
Patient was advised to continue present medications. discussed with the patient medications and laboratory data in detail.  Follow-up with me in 4 months or as advised.  If any blood test was ordered please do 1 week prior to next appointment unless advise to get earlier.  If you have any questions please call the office [phone]

## 2024-08-13 NOTE — PROGRESS NOTES
Assessment/Plan:    1. Essential hypertension  -     Comprehensive metabolic panel; Future  -     CBC and differential; Future  -     Comprehensive metabolic panel  -     CBC and differential  2. Prostate cancer (HCC)  3. Hypercholesteremia  -     Comprehensive metabolic panel; Future  -     Lipid panel; Future  -     Comprehensive metabolic panel  -     Lipid panel  4. Prediabetes  -     Comprehensive metabolic panel; Future  -     Hemoglobin A1C; Future  -     Comprehensive metabolic panel  -     Hemoglobin A1C  5. Vitamin D deficiency  6. Subclinical hyperthyroidism  7. Multiple thyroid nodules  8. Thrombocytosis  -     CBC and differential; Future  -     CBC and differential  9. BMI 35.0-35.9,adult  Assessment & Plan:  Patient  was advised to decrease portion size.  Advised to decrease carb, sugar, cholesterol intake.  Advised to exercise 3-5 times per week.  Advised to lose weight.  10. High risk medication use  -     Comprehensive metabolic panel; Future  -     Comprehensive metabolic panel     Discussion/summary/plan:    Blood pressure well-controlled advised to continue present dose of amlodipine and low-salt diet.  For prostate carcinoma he has been seen and followed by urologist.  Last cholesterol 187, triglyceride 107, HDL 51, .  Discussed with the patient to keep LDL at least less than 100.  Patient states now he takes his rosuvastatin regularly and has been watching diet closely.  Will follow lipid panel.  Last hemoglobin A1c decreased from 6.2-6.0 patient said he has been watching diet for sugar and carbs intake as well.  Will follow blood sugar hemoglobin A1c.  Vitamin D deficiency resolved vitamin D level 33 advised to continue same dose of vitamin D supplement.  For hyperthyroidism as well as thyroid nodules he has been seen and followed by an endocrinologist going for thyroid uptake and scan study.  Last platelet count 383K with normal WBC and hemoglobin will follow CBC.    Subjective:  Patient presents for follow-up.      Patient ID: Reid Jiang is a 59 y.o. male.    HPI  59-year-old male patient presents for follow-up of his medical problems.  He denies any chest pain, shortness of breath, pain in abdomen.  Denies any cough, fever, chills.  Denies any nausea vomiting diarrhea.  Has been seen and followed by an urologist as well as an endocrinologist.    The following portions of the patient's history were reviewed and updated as appropriate:     Past Medical History:  He has a past medical history of Acute pain of left shoulder (02/27/2023), BMI 33.0-33.9,adult (05/25/2023), BMI 35.0-35.9,adult (08/13/2024), BMI 36.0-36.9,adult (04/27/2021), BMI 37.0-37.9, adult (01/26/2021), Bradycardia, Bronchitis (01/05/2023), Burning sensation of lower extremity (05/25/2023), Chronic left shoulder pain (08/28/2023), Elevated PSA (04/27/2021), Hypercholesteremia (01/23/2021), Hyperglycemia (01/23/2021), Hyperplastic polyp of sigmoid colon (04/27/2021), Hypertension, Low TSH level (01/23/2021), Numbness and tingling of right leg (05/25/2023), Other insomnia (04/27/2021), Physical exam, annual (04/08/2024), Prediabetes (10/26/2021), Skin rash (04/26/2022), Thrombocytosis (08/13/2024), and Weight gain (01/23/2021).,  _______________________________________________________________________  Past Surgical History:   has a past surgical history that includes Hernia repair (Bilateral); Hemorroidectomy; Shoulder surgery (Left); Colonoscopy (2013); [place] tooth extraction; and Shoulder arthroscopy w/ rotator cuff repair (Left, 03/2023).,  _______________________________________________________________________  Family History:  family history includes Diabetes type II in his maternal uncle and maternal uncle; Dialysis in his maternal uncle; Hypertension in his father and mother; No Known Problems in his brother, daughter, maternal grandfather, maternal grandmother, paternal grandfather, paternal grandmother, paternal  uncle, and sister; Other in his father; Prostate cancer in his family and father.,  _______________________________________________________________________  Social History:   reports that he has never smoked. He has been exposed to tobacco smoke. He has never used smokeless tobacco. He reports current alcohol use of about 4.0 standard drinks of alcohol per week. He reports that he does not use drugs.,  _______________________________________________________________________  Allergies:  is allergic to penicillins and penicillin g..  _______________________________________________________________________  Current Outpatient Medications   Medication Sig Dispense Refill   • amLODIPine (NORVASC) 10 mg tablet Take 1 tablet (10 mg total) by mouth daily 90 tablet 1   • Cholecalciferol (D3) 50 MCG (2000 UT) TABS      • CINNAMON PO Take by mouth daily     • rosuvastatin (CRESTOR) 5 mg tablet TAKE 1 TABLET (5 MG TOTAL) BY MOUTH DAILY. 90 tablet 1     No current facility-administered medications for this visit.     _______________________________________________________________________  Review of Systems   Constitutional:  Negative for chills and fever.   HENT:  Negative for congestion, ear pain, hearing loss, nosebleeds, sinus pain, sore throat and trouble swallowing.    Eyes:  Negative for discharge, redness and visual disturbance.   Respiratory:  Negative for cough, chest tightness and shortness of breath.    Cardiovascular:  Negative for chest pain and palpitations.   Gastrointestinal:  Negative for abdominal pain, blood in stool, constipation, diarrhea, nausea and vomiting.   Genitourinary:  Negative for dysuria, flank pain and hematuria.   Musculoskeletal:  Negative for arthralgias, myalgias and neck pain.   Skin:  Negative for color change and rash.   Neurological:  Negative for dizziness, speech difficulty, weakness and headaches.   Hematological:  Does not bruise/bleed easily.   Psychiatric/Behavioral:  Negative for  "agitation and behavioral problems.          Objective:  Vitals:    08/13/24 1119   BP: 120/82   BP Location: Left arm   Patient Position: Sitting   Cuff Size: Large   Pulse: 68   Resp: 18   Temp: 97.9 °F (36.6 °C)   TempSrc: Temporal   SpO2: 98%   Weight: 112 kg (246 lb)   Height: 5' 10\" (1.778 m)     Body mass index is 35.3 kg/m².     Physical Exam  Vitals and nursing note reviewed.   Constitutional:       General: He is not in acute distress.     Appearance: Normal appearance.   HENT:      Head: Normocephalic and atraumatic.      Right Ear: External ear normal.      Left Ear: External ear normal.      Nose: Nose normal.      Mouth/Throat:      Mouth: Mucous membranes are moist.   Eyes:      General: No scleral icterus.        Right eye: No discharge.         Left eye: No discharge.      Extraocular Movements: Extraocular movements intact.      Conjunctiva/sclera: Conjunctivae normal.   Cardiovascular:      Rate and Rhythm: Normal rate and regular rhythm.      Pulses: Normal pulses.      Heart sounds: Normal heart sounds. No murmur heard.  Pulmonary:      Effort: Pulmonary effort is normal. No respiratory distress.      Breath sounds: Normal breath sounds. No wheezing, rhonchi or rales.   Abdominal:      General: Bowel sounds are normal.      Palpations: Abdomen is soft.      Tenderness: There is no abdominal tenderness.   Musculoskeletal:         General: Normal range of motion.      Cervical back: Normal range of motion and neck supple. No muscular tenderness.      Right lower leg: No edema.      Left lower leg: No edema.   Skin:     General: Skin is warm.      Findings: No rash.   Neurological:      General: No focal deficit present.      Mental Status: He is alert and oriented to person, place, and time.      Motor: No weakness.      Coordination: Coordination normal.   Psychiatric:         Mood and Affect: Mood normal.         Behavior: Behavior normal.         "

## 2024-08-18 DIAGNOSIS — E78.00 HYPERCHOLESTEREMIA: ICD-10-CM

## 2024-08-18 RX ORDER — ROSUVASTATIN CALCIUM 5 MG/1
5 TABLET, COATED ORAL DAILY
Qty: 90 TABLET | Refills: 1 | Status: SHIPPED | OUTPATIENT
Start: 2024-08-18

## 2024-08-19 LAB — PSA SERPL-MCNC: 5.12 NG/ML

## 2024-08-26 ENCOUNTER — OFFICE VISIT (OUTPATIENT)
Dept: UROLOGY | Facility: CLINIC | Age: 59
End: 2024-08-26
Payer: COMMERCIAL

## 2024-08-26 VITALS
BODY MASS INDEX: 36.08 KG/M2 | OXYGEN SATURATION: 97 % | HEART RATE: 52 BPM | WEIGHT: 252 LBS | SYSTOLIC BLOOD PRESSURE: 120 MMHG | HEIGHT: 70 IN | DIASTOLIC BLOOD PRESSURE: 80 MMHG

## 2024-08-26 DIAGNOSIS — C61 PROSTATE CANCER (HCC): Primary | ICD-10-CM

## 2024-08-26 PROCEDURE — 99213 OFFICE O/P EST LOW 20 MIN: CPT | Performed by: UROLOGY

## 2024-08-26 NOTE — ASSESSMENT & PLAN NOTE
"The patient has low risk prostate cancer but a higher risk decipher score.  We have had long conversation about this multiple times and is elected for active surveillance which she continues to want to pursue now.  His PSA is stable as is his MRI. He voiced understanding of the the potential for his cancer to progress over time based on genetic risk.      At this point the patient wants to continue active surveillance which is reasonable.  He understands I recommend a \"shorter leash\" which includes more frequent biopsies and a lower threshold to consider treatment.    Will plan for PSA in 6 months.  Will plan for biopsy in 2025 no matter what.  "

## 2024-08-26 NOTE — PROGRESS NOTES
"Assessment/Plan:    Prostate cancer (HCC)  The patient has low risk prostate cancer but a higher risk decipher score.  We have had long conversation about this multiple times and is elected for active surveillance which she continues to want to pursue now.  His PSA is stable as is his MRI. He voiced understanding of the the potential for his cancer to progress over time based on genetic risk.      At this point the patient wants to continue active surveillance which is reasonable.  He understands I recommend a \"shorter leash\" which includes more frequent biopsies and a lower threshold to consider treatment.    Will plan for PSA in 6 months.  Will plan for biopsy in 2025 no matter what.          Subjective:      Patient ID: Reid Jiang is a 59 y.o. male.    HPI  59-year-old -American man with low risk prostate cancer but high risk decipher score.     His PSA has been as high as 5, MRI in 2022 was PI-RADS 2 with a 65 g prostate.   March 2024 office biopsy, prostate measured 75 g.  1 core of GG! at left lateral base.    Decipher score returned at 0.64 which is considered high risk.    Aug 2024 MRI - pirads 2 67cc  He has elected for active surveillance  Feb 2024 PSA 5.2  Aug 2024 PSA 5.1, deferred on DEV.     He is a  and apparently there has been some relationship established between the foam often used and different medical issues.    Past Surgical History:   Procedure Laterality Date    COLONOSCOPY  2013    JONATHAN Yanez     HEMORROIDECTOMY      [id number]     HERNIA REPAIR Bilateral     In childhood     SHOULDER ARTHROSCOPY W/ ROTATOR CUFF REPAIR Left 03/2023    SHOULDER SURGERY Left     Pain - impingement    WISDOM TOOTH EXTRACTION          Past Medical History:   Diagnosis Date    Acute pain of left shoulder 02/27/2023    BMI 33.0-33.9,adult 05/25/2023    BMI 35.0-35.9,adult 08/13/2024    BMI 36.0-36.9,adult 04/27/2021    BMI 37.0-37.9, adult 01/26/2021    Bradycardia     Bronchitis 01/05/2023    " Burning sensation of lower extremity 05/25/2023    Chronic left shoulder pain 08/28/2023    Elevated PSA 04/27/2021    Hypercholesteremia 01/23/2021    Hyperglycemia 01/23/2021    Hyperplastic polyp of sigmoid colon 04/27/2021    Hypertension     Low TSH level 01/23/2021    Numbness and tingling of right leg 05/25/2023    Other insomnia 04/27/2021    Physical exam, annual 04/08/2024    Prediabetes 10/26/2021    Skin rash 04/26/2022    Thrombocytosis 08/13/2024    Weight gain 01/23/2021        AUA SYMPTOM SCORE      Flowsheet Row Most Recent Value   AUA SYMPTOM SCORE    How often have you had a sensation of not emptying your bladder completely after you finished urinating? 0 (P)     How often have you had to urinate again less than two hours after you finished urinating? 0 (P)     How often have you found you stopped and started again several times when you urinate? 0 (P)     How often have you found it difficult to postpone urination? 0 (P)     How often have you had a weak urinary stream? 1 (P)     How often have you had to push or strain to begin urination? 0 (P)     How many times did you most typically get up to urinate from the time you went to bed at night until the time you got up in the morning? 1 (P)     Quality of Life: If you were to spend the rest of your life with your urinary condition just the way it is now, how would you feel about that? 1 (P)     AUA SYMPTOM SCORE 2 (P)               Review of Systems   Constitutional:  Negative for chills and fever.   HENT:  Negative for ear pain and sore throat.    Eyes:  Negative for pain and visual disturbance.   Respiratory:  Negative for cough and shortness of breath.    Cardiovascular:  Negative for chest pain and palpitations.   Gastrointestinal:  Negative for abdominal pain and vomiting.   Genitourinary:  Negative for dysuria and hematuria.   Musculoskeletal:  Negative for arthralgias and back pain.   Skin:  Negative for color change and rash.  "  Neurological:  Negative for seizures and syncope.   All other systems reviewed and are negative.        Objective:      /80 (BP Location: Left arm, Patient Position: Sitting, Cuff Size: Adult)   Pulse (!) 52   Ht 5' 10\" (1.778 m)   Wt 114 kg (252 lb)   SpO2 97%   BMI 36.16 kg/m²     Lab Results   Component Value Date    PSA 5.12 (H) 08/19/2024          Physical Exam  Vitals reviewed.   Constitutional:       Appearance: Normal appearance. He is normal weight.   HENT:      Head: Normocephalic and atraumatic.   Eyes:      Pupils: Pupils are equal, round, and reactive to light.   Abdominal:      General: Abdomen is flat.   Genitourinary:     Comments: Patient did not want a digital rectal exam today  Neurological:      General: No focal deficit present.      Mental Status: He is alert and oriented to person, place, and time.   Psychiatric:         Mood and Affect: Mood normal.         Thought Content: Thought content normal.           Orders  Orders Placed This Encounter   Procedures    PSA Total, Diagnostic     Standing Status:   Future     Number of Occurrences:   1     Standing Expiration Date:   8/26/2025     "

## 2024-09-09 ENCOUNTER — HOSPITAL ENCOUNTER (OUTPATIENT)
Dept: RADIOLOGY | Facility: HOSPITAL | Age: 59
Discharge: HOME | End: 2024-09-09
Attending: INTERNAL MEDICINE
Payer: COMMERCIAL

## 2024-09-09 DIAGNOSIS — E05.90 SUBCLINICAL HYPERTHYROIDISM: ICD-10-CM

## 2024-09-09 DIAGNOSIS — E04.2 MULTIPLE THYROID NODULES: ICD-10-CM

## 2024-09-09 PROCEDURE — A9516 IODINE I-123 SOD IODIDE MIC: HCPCS

## 2024-09-09 PROCEDURE — 78014 THYROID IMAGING W/BLOOD FLOW: CPT

## 2024-09-10 ENCOUNTER — HOSPITAL ENCOUNTER (OUTPATIENT)
Dept: RADIOLOGY | Facility: HOSPITAL | Age: 59
Discharge: HOME | End: 2024-09-10
Attending: INTERNAL MEDICINE
Payer: COMMERCIAL

## 2024-10-03 DIAGNOSIS — I10 ESSENTIAL HYPERTENSION: ICD-10-CM

## 2024-10-03 RX ORDER — AMLODIPINE BESYLATE 10 MG/1
10 TABLET ORAL DAILY
Qty: 90 TABLET | Refills: 1 | Status: SHIPPED | OUTPATIENT
Start: 2024-10-03

## 2024-10-07 ENCOUNTER — HOSPITAL ENCOUNTER (OUTPATIENT)
Dept: RADIOLOGY | Facility: HOSPITAL | Age: 59
Discharge: HOME | End: 2024-10-07
Attending: INTERNAL MEDICINE
Payer: COMMERCIAL

## 2024-10-07 DIAGNOSIS — E04.2 MULTIPLE THYROID NODULES: ICD-10-CM

## 2024-10-07 PROCEDURE — 88172 CYTP DX EVAL FNA 1ST EA SITE: CPT | Performed by: PATHOLOGY

## 2024-10-07 PROCEDURE — 88173 CYTOPATH EVAL FNA REPORT: CPT | Performed by: PATHOLOGY

## 2024-10-07 RX ORDER — LIDOCAINE HYDROCHLORIDE 10 MG/ML
5 INJECTION, SOLUTION EPIDURAL; INFILTRATION; INTRACAUDAL; PERINEURAL ONCE
Status: COMPLETED | OUTPATIENT
Start: 2024-10-07 | End: 2024-10-07

## 2024-10-07 RX ADMIN — LIDOCAINE HYDROCHLORIDE 2 ML: 10 INJECTION, SOLUTION EPIDURAL; INFILTRATION; INTRACAUDAL; PERINEURAL at 08:33

## 2024-10-09 PROCEDURE — 88173 CYTOPATH EVAL FNA REPORT: CPT | Performed by: PATHOLOGY

## 2024-10-09 PROCEDURE — 88172 CYTP DX EVAL FNA 1ST EA SITE: CPT | Performed by: PATHOLOGY

## 2024-12-07 LAB
ALBUMIN SERPL-MCNC: 4.2 G/DL (ref 3.5–5.7)
ALP SERPL-CCNC: 80 U/L (ref 35–120)
ALT SERPL-CCNC: 13 U/L
ANION GAP SERPL CALCULATED.3IONS-SCNC: 11 MMOL/L (ref 3–11)
AST SERPL-CCNC: 16 U/L
BASOPHILS # BLD AUTO: 0 THOU/CMM (ref 0–0.1)
BASOPHILS NFR BLD AUTO: 0 %
BILIRUB SERPL-MCNC: 0.6 MG/DL (ref 0.2–1)
BUN SERPL-MCNC: 13 MG/DL (ref 7–28)
CALCIUM SERPL-MCNC: 9 MG/DL (ref 8.5–10.5)
CHLORIDE SERPL-SCNC: 102 MMOL/L (ref 100–109)
CHOLEST SERPL-MCNC: 161 MG/DL
CHOLEST/HDLC SERPL: 3.4 {RATIO}
CO2 SERPL-SCNC: 26 MMOL/L (ref 21–31)
CREAT SERPL-MCNC: 0.98 MG/DL (ref 0.53–1.3)
CYTOLOGY CMNT CVX/VAG CYTO-IMP: ABNORMAL
DIFFERENTIAL METHOD BLD: ABNORMAL
EOSINOPHIL # BLD AUTO: 0 THOU/CMM (ref 0–0.5)
EOSINOPHIL NFR BLD AUTO: 1 %
ERYTHROCYTE [DISTWIDTH] IN BLOOD BY AUTOMATED COUNT: 14.7 % (ref 12–16)
EST. AVERAGE GLUCOSE BLD GHB EST-MCNC: 117 MG/DL
GFR/BSA.PRED SERPLBLD CYS-BASED-ARV: 89 ML/MIN/{1.73_M2}
GLUCOSE SERPL-MCNC: 133 MG/DL (ref 65–99)
HBA1C MFR BLD: 5.7 %
HCT VFR BLD AUTO: 42.1 % (ref 37–48)
HDLC SERPL-MCNC: 48 MG/DL (ref 23–92)
HGB BLD-MCNC: 14.1 G/DL (ref 12.5–17)
LDLC SERPL CALC-MCNC: 97 MG/DL
LYMPHOCYTES # BLD AUTO: 1.6 THOU/CMM (ref 1–3)
LYMPHOCYTES NFR BLD AUTO: 27 %
MCH RBC QN AUTO: 29.5 PG (ref 27–36)
MCHC RBC AUTO-ENTMCNC: 33.4 G/DL (ref 32–37)
MCV RBC AUTO: 88 FL (ref 80–100)
MONOCYTES # BLD AUTO: 0.6 THOU/CMM (ref 0.3–1)
MONOCYTES NFR BLD AUTO: 10 %
NEUTROPHILS # BLD AUTO: 3.7 THOU/CMM (ref 1.8–7.8)
NEUTROPHILS NFR BLD AUTO: 62 %
NONHDLC SERPL-MCNC: 113 MG/DL
PLATELET # BLD AUTO: 290 THOU/CMM (ref 140–350)
PMV BLD REES-ECKER: 7.4 FL (ref 7.5–11.3)
POTASSIUM SERPL-SCNC: 3.6 MMOL/L (ref 3.5–5.2)
PROT SERPL-MCNC: 7.1 G/DL (ref 6.3–8.3)
RBC # BLD AUTO: 4.77 MILL/CMM (ref 4–5.4)
SODIUM SERPL-SCNC: 139 MMOL/L (ref 135–145)
TRIGL SERPL-MCNC: 79 MG/DL
WBC # BLD AUTO: 5.9 THOU/CMM (ref 4–10.5)

## 2024-12-16 ENCOUNTER — OFFICE VISIT (OUTPATIENT)
Dept: INTERNAL MEDICINE CLINIC | Facility: CLINIC | Age: 59
End: 2024-12-16
Payer: COMMERCIAL

## 2024-12-16 VITALS
DIASTOLIC BLOOD PRESSURE: 80 MMHG | RESPIRATION RATE: 18 BRPM | BODY MASS INDEX: 35.79 KG/M2 | OXYGEN SATURATION: 99 % | TEMPERATURE: 97.1 F | WEIGHT: 250 LBS | SYSTOLIC BLOOD PRESSURE: 130 MMHG | HEART RATE: 67 BPM | HEIGHT: 70 IN

## 2024-12-16 DIAGNOSIS — E55.9 VITAMIN D DEFICIENCY: ICD-10-CM

## 2024-12-16 DIAGNOSIS — E05.90 SUBCLINICAL HYPERTHYROIDISM: ICD-10-CM

## 2024-12-16 DIAGNOSIS — C61 PROSTATE CANCER (HCC): ICD-10-CM

## 2024-12-16 DIAGNOSIS — E04.2 MULTIPLE THYROID NODULES: ICD-10-CM

## 2024-12-16 DIAGNOSIS — R73.03 PRE-DIABETES: ICD-10-CM

## 2024-12-16 DIAGNOSIS — E78.00 HYPERCHOLESTEREMIA: ICD-10-CM

## 2024-12-16 DIAGNOSIS — I10 ESSENTIAL HYPERTENSION: ICD-10-CM

## 2024-12-16 DIAGNOSIS — J02.8 ACUTE PHARYNGITIS DUE TO OTHER SPECIFIED ORGANISMS: Primary | ICD-10-CM

## 2024-12-16 PROCEDURE — 99214 OFFICE O/P EST MOD 30 MIN: CPT | Performed by: INTERNAL MEDICINE

## 2024-12-16 RX ORDER — AZITHROMYCIN 250 MG/1
TABLET, FILM COATED ORAL
Qty: 6 TABLET | Refills: 0 | Status: SHIPPED | OUTPATIENT
Start: 2024-12-16 | End: 2024-12-21

## 2024-12-16 NOTE — ASSESSMENT & PLAN NOTE
Vitamin D deficient is on vitamin D supplement vitamin D level 33 advised to continue same supplement

## 2024-12-16 NOTE — ASSESSMENT & PLAN NOTE
Patient  was advised to decrease portion size.  Advised to decrease carb, sugar, cholesterol intake.  Advised to exercise 3-5 times per week.  Advised to lose weight.

## 2024-12-16 NOTE — ASSESSMENT & PLAN NOTE
Well-controlled.  Cholesterol 161, triglyceride 79, HDL 48, LDL 97 advised to continue present dose of rosuvastatin and low-cholesterol low carbs diet.

## 2024-12-16 NOTE — PROGRESS NOTES
Name: Reid Jiang      : 1965      MRN: [id number]  Encounter Provider: Sanchez Webb MD  Encounter Date: 2024   Encounter department: [facility] INTERNAL MEDICINE  :  Assessment & Plan  Essential hypertension  Blood pressure is controlled advised to continue present medication and low-salt diet.       Multiple thyroid nodules  Patient has been seen and followed by an endocrinologist.  Had a biopsy was benign.       Prostate cancer (HCC)  Patient has been seen and followed by urologist.       Pre-diabetes  Hemoglobin A1c has been decreasing from 6.2-6.0-5.7.  Advised to continue low sugar low carbs diet.       Hypercholesteremia  Well-controlled.  Cholesterol 161, triglyceride 79, HDL 48, LDL 97 advised to continue present dose of rosuvastatin and low-cholesterol low carbs diet.       Vitamin D deficiency  Vitamin D deficient is on vitamin D supplement vitamin D level 33 advised to continue same supplement       Subclinical hyperthyroidism  Patient has been seen and followed by an endocrinologist has been getting blood tests for thyroid.       BMI 35.0-35.9,adult  Patient  was advised to decrease portion size.  Advised to decrease carb, sugar, cholesterol intake.  Advised to exercise 3-5 times per week.  Advised to lose weight.       Acute pharyngitis due to other specified organisms  He developed sore throat since last Thursday for last 4 days.  Denies any fever, cough, chest pain, shortness of breath.  Advised for COVID-19 test but he does not want.  Denies any exposure to anybody with COVID-19 infection.  Possible viral versus bacterial URI.  Will start antibiotic Z-Jatinder advised for salt water gargle and plenty of liquids.  Exam has erythema of the pharynx without any exudate at present.  Orders:  •  azithromycin (Zithromax) 250 mg tablet; Take 2 tablets (500 mg total) by mouth daily for 1 day, THEN 1 tablet (250 mg total) daily for 4 days.           History of Present  "Illness     HPI  59-year-old white male patient present for follow-up of his medical problems.  Complain of sore throat for the last 4 days.  Denies any fever, chills.  Denies any cough, chest pain, shortness of breath.  Denies any sinus problem.  Denies any nausea vomiting diarrhea or pain abdomen.    Review of Systems   Constitutional:  Negative for chills and fever.   HENT:  Positive for sore throat. Negative for congestion, ear pain, hearing loss, nosebleeds, sinus pain and trouble swallowing.    Eyes:  Negative for discharge, redness and visual disturbance.   Respiratory:  Negative for cough, chest tightness and shortness of breath.    Cardiovascular:  Negative for chest pain and palpitations.   Gastrointestinal:  Negative for abdominal pain, blood in stool, constipation, diarrhea, nausea and vomiting.   Genitourinary:  Negative for dysuria, flank pain, frequency and hematuria.   Musculoskeletal:  Negative for arthralgias, myalgias and neck pain.   Skin:  Negative for color change and rash.   Neurological:  Negative for dizziness, speech difficulty, weakness and headaches.   Hematological:  Does not bruise/bleed easily.   Psychiatric/Behavioral:  Negative for agitation and behavioral problems.        Objective   /80 (BP Location: Left arm, Patient Position: Sitting, Cuff Size: Large)   Pulse 67   Temp (!) 97.1 °F (36.2 °C) (Temporal)   Resp 18   Ht 5' 10\" (1.778 m)   Wt 113 kg (250 lb)   SpO2 99%   BMI 35.87 kg/m²      Physical Exam  Vitals and nursing note reviewed.   Constitutional:       General: He is not in acute distress.     Appearance: He is obese.   HENT:      Head: Normocephalic and atraumatic.      Right Ear: Ear canal normal.      Left Ear: Ear canal normal.      Mouth/Throat:      Mouth: Mucous membranes are moist.      Pharynx: Posterior oropharyngeal erythema present. No oropharyngeal exudate.   Eyes:      Conjunctiva/sclera: Conjunctivae normal.   Cardiovascular:      Rate and " Rhythm: Normal rate and regular rhythm.      Heart sounds: Normal heart sounds. No murmur heard.  Pulmonary:      Effort: Pulmonary effort is normal. No respiratory distress.      Breath sounds: Normal breath sounds. No wheezing, rhonchi or rales.   Abdominal:      General: Bowel sounds are normal.      Palpations: Abdomen is soft.      Tenderness: There is no abdominal tenderness.   Musculoskeletal:         General: No swelling.      Cervical back: Normal range of motion and neck supple.   Skin:     General: Skin is warm and dry.      Capillary Refill: Capillary refill takes less than 2 seconds.   Neurological:      General: No focal deficit present.      Mental Status: He is alert and oriented to person, place, and time.   Psychiatric:         Mood and Affect: Mood normal.         Behavior: Behavior normal.

## 2024-12-16 NOTE — PATIENT INSTRUCTIONS
Patient was advised to continue present medications. discussed with the patient medications and laboratory data in detail.  Follow-up with me in 4 months or as advised.  If any blood test was ordered please do 1 week prior to next appointment unless advise to get earlier.  If you have any questions please call the office [phone]

## 2024-12-16 NOTE — ASSESSMENT & PLAN NOTE
Patient has been seen and followed by an endocrinologist has been getting blood tests for thyroid.

## 2024-12-16 NOTE — ASSESSMENT & PLAN NOTE
He developed sore throat since last Thursday for last 4 days.  Denies any fever, cough, chest pain, shortness of breath.  Advised for COVID-19 test but he does not want.  Denies any exposure to anybody with COVID-19 infection.  Possible viral versus bacterial URI.  Will start antibiotic Z-Jatinder advised for salt water gargle and plenty of liquids.  Exam has erythema of the pharynx without any exudate at present.  Orders:  •  azithromycin (Zithromax) 250 mg tablet; Take 2 tablets (500 mg total) by mouth daily for 1 day, THEN 1 tablet (250 mg total) daily for 4 days.

## 2024-12-16 NOTE — ASSESSMENT & PLAN NOTE
Hemoglobin A1c has been decreasing from 6.2-6.0-5.7.  Advised to continue low sugar low carbs diet.

## 2024-12-18 ENCOUNTER — TELEPHONE (OUTPATIENT)
Age: 59
End: 2024-12-18

## 2024-12-18 NOTE — TELEPHONE ENCOUNTER
Spoke with [employer], because we do not usually fax AVS's to employers.    He said it was ok to do because it is going to the Nurse @ [employer].      AVS Faxed

## 2024-12-18 NOTE — TELEPHONE ENCOUNTER
Patient had F/U on 12/16/24. He would like a copy of his After Visit Summary faxed to his employer.  Fax #[phone].  Thank you.

## 2025-01-15 PROBLEM — J02.8 ACUTE PHARYNGITIS DUE TO OTHER SPECIFIED ORGANISMS: Status: RESOLVED | Noted: 2024-12-16 | Resolved: 2025-01-15

## 2025-01-31 LAB
25(OH)D3+25(OH)D2 SERPL-MCNC: 36 NG/ML (ref 30–100)
EST. AVERAGE GLUCOSE BLD GHB EST-MCNC: 128 MG/DL
HBA1C MFR BLD: 6.1 %
T3 SERPL-MCNC: 1.24 NG/ML (ref 0.87–1.78)
T4 FREE SERPL-MCNC: 1.03 NG/DL (ref 0.61–1.12)
TSH SERPL-ACNC: 0.38 UIU/ML (ref 0.45–5.33)

## 2025-02-03 ENCOUNTER — RESULTS FOLLOW-UP (OUTPATIENT)
Dept: ENDOCRINOLOGY | Facility: CLINIC | Age: 60
End: 2025-02-03

## 2025-02-03 ENCOUNTER — RESULTS FOLLOW-UP (OUTPATIENT)
Dept: OTHER | Facility: HOSPITAL | Age: 60
End: 2025-02-03

## 2025-02-04 ENCOUNTER — OFFICE VISIT (OUTPATIENT)
Dept: UROLOGY | Facility: CLINIC | Age: 60
End: 2025-02-04
Payer: COMMERCIAL

## 2025-02-04 ENCOUNTER — TELEPHONE (OUTPATIENT)
Dept: UROLOGY | Facility: CLINIC | Age: 60
End: 2025-02-04

## 2025-02-04 VITALS
BODY MASS INDEX: 35.45 KG/M2 | OXYGEN SATURATION: 98 % | SYSTOLIC BLOOD PRESSURE: 142 MMHG | WEIGHT: 247.6 LBS | HEIGHT: 70 IN | DIASTOLIC BLOOD PRESSURE: 82 MMHG | HEART RATE: 68 BPM

## 2025-02-04 DIAGNOSIS — C61 PROSTATE CANCER (HCC): Primary | ICD-10-CM

## 2025-02-04 PROCEDURE — 99213 OFFICE O/P EST LOW 20 MIN: CPT | Performed by: UROLOGY

## 2025-02-04 NOTE — PROGRESS NOTES
Assessment/Plan:    Prostate cancer (HCC)  Patient with low risk prostate cancer on biopsy but high risk decipher score with MRI not show any concerning lesions but rising PSA.  We discussed options include treatment of his cancer via surgery or cryoablation or radiation versus further workup with the prostate biopsy and potentially repeat MRI.    Had a long conversation regarding treatment options.  The patient is interested in ablation (and also proton beam therapy) but wants get a biopsy first.  I think this is very reasonable.  We discussed getting on the MRI before biopsy which may or may not be useful since prior MRIs including within the last year have not shown distinct lesion.    In the end we agreed to move forward with prostate biopsy without MRI prior.          Subjective:      Patient ID: Reid Jiang is a 60 y.o. male.    HPI    60-year-old -American man with low risk prostate cancer but high risk decipher score.     His PSA has been as high as 5, MRI in 2022 was PI-RADS 2 with a 65 g prostate.   Feb 2024 PSA 5.2  Aug 2024 PSA 5.1, deferred on DEV.  March 2024 office biopsy, prostate measured 75 g.  1 core of GG1 at left lateral base.    Decipher 0.64 =high risk.    Aug 2024 MRI - Pirads 2, 67cc  Jan 2025 PSA 6.98 (outside lab)     He is a  and apparently there has been some relationship established between the foam often used and different medical issues.    Past Surgical History:   Procedure Laterality Date    COLONOSCOPY  2013    JONATHAN Yanez     HEMORROIDECTOMY      [id number]     HERNIA REPAIR Bilateral     In childhood     SHOULDER ARTHROSCOPY W/ ROTATOR CUFF REPAIR Left 03/2023    SHOULDER SURGERY Left     Pain - impingement    US GUIDED THYROID BIOPSY  10/7/2024    WISDOM TOOTH EXTRACTION          Past Medical History:   Diagnosis Date    Acute pain of left shoulder 02/27/2023    Acute pharyngitis due to other specified organisms 12/16/2024    BMI 33.0-33.9,adult 05/25/2023     BMI 35.0-35.9,adult 08/13/2024    BMI 36.0-36.9,adult 04/27/2021    BMI 37.0-37.9, adult 01/26/2021    Bradycardia     Bronchitis 01/05/2023    Burning sensation of lower extremity 05/25/2023    Chronic left shoulder pain 08/28/2023    Elevated PSA 04/27/2021    Hypercholesteremia 01/23/2021    Hyperglycemia 01/23/2021    Hyperplastic polyp of sigmoid colon 04/27/2021    Hypertension     Low TSH level 01/23/2021    Numbness and tingling of right leg 05/25/2023    Other insomnia 04/27/2021    Physical exam, annual 04/08/2024    Prediabetes 10/26/2021    Skin rash 04/26/2022    Thrombocytosis 08/13/2024    Weight gain 01/23/2021        AUA SYMPTOM SCORE      Flowsheet Row Most Recent Value   AUA SYMPTOM SCORE    How often have you had a sensation of not emptying your bladder completely after you finished urinating? 0 (P)     How often have you had to urinate again less than two hours after you finished urinating? 1 (P)     How often have you found you stopped and started again several times when you urinate? 0 (P)     How often have you found it difficult to postpone urination? 0 (P)     How often have you had a weak urinary stream? 0 (P)     How often have you had to push or strain to begin urination? 0 (P)     How many times did you most typically get up to urinate from the time you went to bed at night until the time you got up in the morning? 1 (P)     Quality of Life: If you were to spend the rest of your life with your urinary condition just the way it is now, how would you feel about that? 1 (P)     AUA SYMPTOM SCORE 2 (P)               Review of Systems   Constitutional:  Negative for chills and fever.   HENT:  Negative for ear pain and sore throat.    Eyes:  Negative for pain and visual disturbance.   Respiratory:  Negative for cough and shortness of breath.    Cardiovascular:  Negative for chest pain and palpitations.   Gastrointestinal:  Negative for abdominal pain and vomiting.   Genitourinary:  Negative  "for dysuria and hematuria.   Musculoskeletal:  Negative for arthralgias and back pain.   Skin:  Negative for color change and rash.   Neurological:  Negative for seizures and syncope.   All other systems reviewed and are negative.        Objective:      /82 (BP Location: Left arm, Patient Position: Sitting, Cuff Size: Large)   Pulse 68   Ht 5' 10\" (1.778 m)   Wt 112 kg (247 lb 9.6 oz)   SpO2 98%   BMI 35.53 kg/m²     Lab Results   Component Value Date    PSA 5.12 (H) 08/19/2024          Physical Exam  Vitals reviewed.   Constitutional:       Appearance: Normal appearance. He is normal weight.   HENT:      Head: Normocephalic and atraumatic.   Eyes:      Pupils: Pupils are equal, round, and reactive to light.   Abdominal:      General: Abdomen is flat.   Neurological:      General: No focal deficit present.      Mental Status: He is alert and oriented to person, place, and time.   Psychiatric:         Mood and Affect: Mood normal.         Thought Content: Thought content normal.           Orders  No orders of the defined types were placed in this encounter.    "

## 2025-02-04 NOTE — ASSESSMENT & PLAN NOTE
Patient with low risk prostate cancer on biopsy but high risk decipher score with MRI not show any concerning lesions but rising PSA.  We discussed options include treatment of his cancer via surgery or cryoablation or radiation versus further workup with the prostate biopsy and potentially repeat MRI.    Had a long conversation regarding treatment options.  The patient is interested in ablation (and also proton beam therapy) but wants get a biopsy first.  I think this is very reasonable.  We discussed getting on the MRI before biopsy which may or may not be useful since prior MRIs including within the last year have not shown distinct lesion.    In the end we agreed to move forward with prostate biopsy without MRI prior.

## 2025-02-04 NOTE — TELEPHONE ENCOUNTER
Adama Landrum,    Good day,    Please kindly advise.    Dr. Benitez patient seen today, will you be scheduling the Prostate Biopsy? I ask due to no availability.    Let me know when you get a chance.    Thank you in advance.

## 2025-02-05 NOTE — TELEPHONE ENCOUNTER
Patient called and asked for the status of his biopsy. Would like a call back.    CB: [phone]

## 2025-02-06 NOTE — TELEPHONE ENCOUNTER
Pt returned call.  He is unable to do the 2/18.  Stated that he has another appt that day.    Also pt starts work 1 pm so if you need to speak with him please call before 1.    Pt can be reached at [phone]

## 2025-02-06 NOTE — TELEPHONE ENCOUNTER
LM to contact office. If he calls back, please offer Feb 24th @ 10 am at the [place] office. That is the only other appt I can offer. If he is unavailable then too, then March 5th @ 9:30 am also in [place] office but please make sure he is advised that he is delaying his care. I will schedule either appt.

## 2025-02-11 ENCOUNTER — OFFICE VISIT (OUTPATIENT)
Dept: ENDOCRINOLOGY | Facility: CLINIC | Age: 60
End: 2025-02-11
Payer: COMMERCIAL

## 2025-02-11 VITALS
SYSTOLIC BLOOD PRESSURE: 140 MMHG | DIASTOLIC BLOOD PRESSURE: 86 MMHG | OXYGEN SATURATION: 98 % | HEIGHT: 70 IN | WEIGHT: 248 LBS | BODY MASS INDEX: 35.5 KG/M2 | HEART RATE: 67 BPM

## 2025-02-11 DIAGNOSIS — R73.03 PRE-DIABETES: ICD-10-CM

## 2025-02-11 DIAGNOSIS — E05.90 SUBCLINICAL HYPERTHYROIDISM: Primary | ICD-10-CM

## 2025-02-11 DIAGNOSIS — E04.2 MULTIPLE THYROID NODULES: ICD-10-CM

## 2025-02-11 DIAGNOSIS — E66.812 CLASS 2 OBESITY WITHOUT SERIOUS COMORBIDITY WITH BODY MASS INDEX (BMI) OF 35.0 TO 35.9 IN ADULT, UNSPECIFIED OBESITY TYPE: ICD-10-CM

## 2025-02-11 PROCEDURE — 99214 OFFICE O/P EST MOD 30 MIN: CPT | Performed by: INTERNAL MEDICINE

## 2025-02-11 NOTE — PROGRESS NOTES
" Reid Jiang 60 y.o. male MRN: [id number]    Encounter: [id number]      Assessment & Plan     Subclinical hyperthyroidism  Multiple thyroid nodules  Clinically euthyroid  Repeat thyroid ultrasound earliest in October 2025-ordered  Follow-up in 6 months with repeat labs, check TRAb antibody.   Recommend earlier repeat thyroid function tests if patient has any symptoms of hypo-/hyperthyroidism     3.  prediabetes  4.  Obesity, BMI 35  Not on any occasions  Diet, lifestyle modifications as discussed.  Patient will try to increase protein intake in diet, include resistance exercises at least twice a week    CC: Subclinical hypoerhyroidism    History of Present Illness     HPI:  Reid Jiang is a 60 y.o. male who is here for a follow-up of subclinical hyperthyroidism, multiple thyroid nodules.  Also has a past medical history of prediabetes, vitamin D deficiency    Last seen in 8/2024  Per my prior notes  \"Initially noted to have suppressed TSH in 12/2020, had insomnia but no other symptoms of hyperthyroidism .  In follow-up, symptoms improved however continues to have subclinical hyperthyroidism on labs, was decided to monitor  TSI, TPO, Tg Antibody negative      [facility] thyroid 9/2022 - stable nodules   [facility] 8/2023: stable nodules\"     Ultrasound thyroid 7/2024 - Isthmus nodule and left lower pole nodule significantly increased in size from prior.  Thyroid uptake and scan 9/2024-hyperfunctioning nodules.  Mildly increased thyroid uptake at both 6 and 24 hours  Status post FNA biopsy 10/7/2024 of both the left isthmus and left thyroid lower pole nodule-benign     Energy levels are good, appetite is normal.  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       Review of Systems    Historical Information   Past Medical History:   Diagnosis Date    Acute pain of left shoulder 02/27/2023    Acute pharyngitis due to other specified organisms 12/16/2024    BMI 33.0-33.9,adult 05/25/2023    BMI 35.0-35.9,adult 08/13/2024    BMI 36.0-36.9,adult 04/27/2021    BMI 37.0-37.9, adult 01/26/2021    Bradycardia     Bronchitis 01/05/2023    Burning sensation of lower extremity 05/25/2023    Chronic left shoulder pain 08/28/2023    Elevated PSA 04/27/2021    Hypercholesteremia 01/23/2021    Hyperglycemia 01/23/2021    Hyperplastic polyp of sigmoid colon 04/27/2021    Hypertension     Low TSH level 01/23/2021    Numbness and tingling of right leg 05/25/2023    Other insomnia 04/27/2021    Physical exam, annual 04/08/2024    Prediabetes 10/26/2021    Skin rash 04/26/2022    Thrombocytosis 08/13/2024    Weight gain 01/23/2021     Past Surgical History:   Procedure Laterality Date    COLONOSCOPY  2013    JONATHAN Yanez     COLONOSCOPY  2022    next due in 2027    HEMORROIDECTOMY      [id number]     HERNIA REPAIR Bilateral     In childhood     SHOULDER ARTHROSCOPY W/ ROTATOR CUFF REPAIR Left 03/2023    SHOULDER SURGERY Left     Pain - impingement    US GUIDED THYROID BIOPSY  10/07/2024    WISDOM TOOTH EXTRACTION       Social History   Social History     Substance and Sexual Activity   Alcohol Use Yes    Alcohol/week: 4.0 standard drinks of alcohol    Types: 4 Standard drinks or equivalent per week    Comment: Occasional      Social History     Substance and Sexual Activity   Drug Use Never    Comment: No dug use - As per [employer]     Social History     Tobacco Use   Smoking Status Never    Passive exposure: Past   Smokeless Tobacco Never  "    Family History:   Family History   Problem Relation Age of Onset    Hypertension Mother     Hypertension Father     Prostate cancer Father         diagnosed in his 60s     No Known Problems Sister     No Known Problems Brother     Diabetes type II Maternal Uncle     Diabetes type II Maternal Uncle     Dialysis Maternal Uncle     No Known Problems Paternal Uncle     No Known Problems Maternal Grandmother     No Known Problems Maternal Grandfather     No Known Problems Paternal Grandmother     No Known Problems Paternal Grandfather     Prostate cancer Family     No Known Problems Daughter        Meds/Allergies   Current Outpatient Medications   Medication Sig Dispense Refill    amLODIPine (NORVASC) 10 mg tablet TAKE 1 TABLET BY MOUTH EVERY DAY 90 tablet 1    Ascorbic Acid (VITAMIN C PO) Take by mouth as needed      ASHWAGANDHA PO Take by mouth daily      Cholecalciferol (D3) 50 MCG (2000 UT) TABS  (Patient taking differently: Take 2,000 Units by mouth daily)      rosuvastatin (CRESTOR) 5 mg tablet TAKE 1 TABLET (5 MG TOTAL) BY MOUTH DAILY. 90 tablet 1     No current facility-administered medications for this visit.     Allergies   Allergen Reactions    Penicillins Swelling    Penicillin G Swelling and Rash       Objective   Vitals: Blood pressure 140/86, pulse 67, height 5' 10\" (1.778 m), weight 112 kg (248 lb), SpO2 98%.    Physical Exam  Constitutional:       General: He is not in acute distress.     Appearance: He is well-developed. He is not diaphoretic.   HENT:      Head: Normocephalic and atraumatic.   Eyes:      Conjunctiva/sclera: Conjunctivae normal.   Neck:      Comments: enlarged thyroid, nodular, nontender, no nodules fixed to the surrounding structures or overlying skin  Cardiovascular:      Rate and Rhythm: Normal rate and regular rhythm.      Heart sounds: Normal heart sounds. No murmur heard.  Pulmonary:      Effort: Pulmonary effort is normal. No respiratory distress.      Breath sounds: Normal " breath sounds. No wheezing.   Abdominal:      General: There is no distension.      Palpations: Abdomen is soft.      Tenderness: There is no abdominal tenderness. There is no guarding.   Musculoskeletal:      Cervical back: Normal range of motion and neck supple.   Skin:     General: Skin is warm and dry.      Findings: No erythema or rash.   Neurological:      Mental Status: He is alert and oriented to person, place, and time.   Psychiatric:         Behavior: Behavior normal.         Thought Content: Thought content normal.         The history was obtained from the review of the chart, patient.    Lab Results:   Lab Results   Component Value Date/Time    Free t4 1.03 01/31/2025 09:47 AM    Free t4 1.07 07/22/2024 12:33 PM     Lab Results   Component Value Date    WBC 5.9 12/07/2024    HGB 14.1 12/07/2024    HCT 42.1 12/07/2024    MCV 88 12/07/2024     12/07/2024     Lab Results   Component Value Date    CREATININE 0.98 12/07/2024    BUN 13 12/07/2024    K 3.6 12/07/2024     12/07/2024    CO2 26 12/07/2024     Lab Results   Component Value Date    HGBA1C 6.1 (H) 01/31/2025     Component      Latest Ref Rng 1/30/2024 7/22/2024 1/31/2025   TSH, POC      0.45 - 5.33 uIU/mL 0.20 (L) (E) 0.20 (L)  0.38 (L)    FREE T4      0.61 - 1.12 ng/dL 0.85 (E) 1.07  1.03    T3, Total      0.87 - 1.78 ng/mL  1.21  1.24       Legend:  (L) Low  (E) External lab result    Imaging Studies:   Results for orders placed during the hospital encounter of 07/29/24    US thyroid    Addendum 8/5/2024 12:44 PM  ADDENDUM:    Transcription error on first dimension of Nodule #1.    Nodule #1. Image 34.  Isthmus measuring 2.3 x 1.2 x 2.1 cm (previously 1.3 x 0.8 x 1.5 cm). This nodule has significantly increased in size from prior    Impression  No nodule meets current ACR criteria for requiring biopsy but follow-up ultrasound is recommended in 1 year.    Reference: ACR Thyroid Imaging, Reporting and Data System (TI-RADS): White Paper  "of the ACR TI-RADS Committee. J AM Ramiro Radiol 2017;14:587-595. Additional recommendations based on American Thyroid Association 2015 guidelines.      Workstation performed: [id number]      Results Review Statement: I personally reviewed the following image studies in PACS and associated radiology reports: [facility] thyroid, uptake and scan. My interpretation of the radiology images/reports is: Mildly hyperactive thyroid, nodules as described above..    Portions of the record may have been created with voice recognition software. Occasional wrong word or \"sound a like\" substitutions may have occurred due to the inherent limitations of voice recognition software. Read the chart carefully and recognize, using context, where substitutions have occurred.     "

## 2025-02-11 NOTE — PATIENT INSTRUCTIONS
Recommend lifestyle efficacious as discussed  Repeat thyroid ultrasound earliest in October 2025-ordered  Follow-up in 6 months with repeat labs  If you notice any symptoms of hypo-/hyperthyroidism earlier than your follow-up, please let us know

## 2025-02-21 DIAGNOSIS — E78.00 HYPERCHOLESTEREMIA: ICD-10-CM

## 2025-02-21 RX ORDER — ROSUVASTATIN CALCIUM 5 MG/1
5 TABLET, COATED ORAL DAILY
Qty: 90 TABLET | Refills: 1 | Status: SHIPPED | OUTPATIENT
Start: 2025-02-21

## 2025-02-24 ENCOUNTER — PROCEDURE VISIT (OUTPATIENT)
Dept: UROLOGY | Facility: AMBULATORY SURGERY CENTER | Age: 60
End: 2025-02-24
Payer: COMMERCIAL

## 2025-02-24 ENCOUNTER — TELEPHONE (OUTPATIENT)
Dept: UROLOGY | Facility: AMBULATORY SURGERY CENTER | Age: 60
End: 2025-02-24

## 2025-02-24 VITALS
DIASTOLIC BLOOD PRESSURE: 90 MMHG | HEIGHT: 70 IN | HEART RATE: 73 BPM | BODY MASS INDEX: 35.9 KG/M2 | WEIGHT: 250.8 LBS | OXYGEN SATURATION: 98 % | SYSTOLIC BLOOD PRESSURE: 170 MMHG

## 2025-02-24 DIAGNOSIS — C61 PROSTATE CANCER (HCC): Primary | ICD-10-CM

## 2025-02-24 DIAGNOSIS — R97.20 ELEVATED PSA: ICD-10-CM

## 2025-02-24 PROCEDURE — 76942 ECHO GUIDE FOR BIOPSY: CPT | Performed by: UROLOGY

## 2025-02-24 PROCEDURE — G0416 PROSTATE BIOPSY, ANY MTHD: HCPCS | Performed by: PATHOLOGY

## 2025-02-24 PROCEDURE — 55700 PR PROSTATE NEEDLE BIOPSY ANY APPROACH: CPT | Performed by: UROLOGY

## 2025-02-24 PROCEDURE — 96372 THER/PROPH/DIAG INJ SC/IM: CPT

## 2025-02-24 PROCEDURE — 88341 IMHCHEM/IMCYTCHM EA ADD ANTB: CPT | Performed by: PATHOLOGY

## 2025-02-24 PROCEDURE — 88344 IMHCHEM/IMCYTCHM EA MLT ANTB: CPT | Performed by: PATHOLOGY

## 2025-02-24 PROCEDURE — 88342 IMHCHEM/IMCYTCHM 1ST ANTB: CPT | Performed by: PATHOLOGY

## 2025-02-24 RX ORDER — GENTAMICIN 40 MG/ML
160 INJECTION, SOLUTION INTRAMUSCULAR; INTRAVENOUS EVERY 8 HOURS
Status: DISCONTINUED | OUTPATIENT
Start: 2025-02-24 | End: 2025-02-24

## 2025-02-24 RX ORDER — GENTAMICIN 40 MG/ML
160 INJECTION, SOLUTION INTRAMUSCULAR; INTRAVENOUS ONCE
Status: COMPLETED | OUTPATIENT
Start: 2025-02-24 | End: 2025-02-24

## 2025-02-24 RX ADMIN — GENTAMICIN 160 MG: 40 INJECTION, SOLUTION INTRAMUSCULAR; INTRAVENOUS at 09:30

## 2025-02-24 NOTE — PROGRESS NOTES
Assessment/Plan:    Prostate cancer (HCC)  Patient with low risk prostate cancer on biopsy but high risk decipher score with MRI not show any concerning lesions but rising PSA.  We previously discussed options include treatment of his cancer via surgery or cryoablation or radiation versus further workup with the prostate biopsy and potentially repeat MRI.  He elected for repeat biopsy which we have done today.  He tolerated this well. We discussed return criteria. This centered on infectious concerns. We also discussed the risk for prolonged bleeding as well as urinary retention.  The patient understands he will see his results before I do through [employer]. I told him I will try to call him after I get the results but this may take 1-2 days. He verbalized understanding.          Subjective:      Patient ID: Reid Jiang is a 60 y.o. male.    HPI    60-year-old -American man with low risk prostate cancer but high risk decipher score.     His PSA has been as high as 5, MRI in 2022 was PI-RADS 2 with a 65 g prostate.   Feb 2024 PSA 5.2  Aug 2024 PSA 5.1, deferred on DEV.  March 2024 office biopsy, prostate measured 75 g.  1 core of GG1 at left lateral base.    Decipher 0.64 =high risk.    Aug 2024 MRI - Pirads 2, 67cc  Jan 2025 PSA 6.98 (outside lab)  2025 prostate biopsy in office 80 g, extra cores from left lateral base    After discussion of options he is interested in proton beam therapy or ablative therapy but has elected to pursue another biopsy before committing to treatment.  He did not want to repeat MRI which is reasonable given MRI was previously PI-RADS 2.    He is a  and apparently there has been some relationship established between the foam often used and different medical issues.    Past Surgical History:   Procedure Laterality Date    COLONOSCOPY  2013    [facility], [place]     COLONOSCOPY  2022    next due in 2027    HEMORROIDECTOMY      [id number]     HERNIA REPAIR Bilateral     In  "childhood     SHOULDER ARTHROSCOPY W/ ROTATOR CUFF REPAIR Left 03/2023    SHOULDER SURGERY Left     Pain - impingement    US GUIDED PROSTATE BIOPSY  02/24/2025    Dr. Benitez    US GUIDED THYROID BIOPSY  10/07/2024    WISDOM TOOTH EXTRACTION          Past Medical History:   Diagnosis Date    Acute pain of left shoulder 02/27/2023    Acute pharyngitis due to other specified organisms 12/16/2024    BMI 33.0-33.9,adult 05/25/2023    BMI 35.0-35.9,adult 08/13/2024    BMI 36.0-36.9,adult 04/27/2021    BMI 37.0-37.9, adult 01/26/2021    Bradycardia     Bronchitis 01/05/2023    Burning sensation of lower extremity 05/25/2023    Chronic left shoulder pain 08/28/2023    Elevated PSA 04/27/2021    Hypercholesteremia 01/23/2021    Hyperglycemia 01/23/2021    Hyperplastic polyp of sigmoid colon 04/27/2021    Hypertension     Low TSH level 01/23/2021    Numbness and tingling of right leg 05/25/2023    Other insomnia 04/27/2021    Physical exam, annual 04/08/2024    Prediabetes 10/26/2021    Skin rash 04/26/2022    Thrombocytosis 08/13/2024    Weight gain 01/23/2021             Review of Systems   Constitutional:  Negative for chills and fever.   HENT:  Negative for ear pain and sore throat.    Eyes:  Negative for pain and visual disturbance.   Respiratory:  Negative for cough and shortness of breath.    Cardiovascular:  Negative for chest pain and palpitations.   Gastrointestinal:  Negative for abdominal pain and vomiting.   Genitourinary:  Negative for dysuria and hematuria.   Musculoskeletal:  Negative for arthralgias and back pain.   Skin:  Negative for color change and rash.   Neurological:  Negative for seizures and syncope.   All other systems reviewed and are negative.        Objective:      /90 (BP Location: Left arm, Patient Position: Sitting, Cuff Size: Adult)   Pulse 73   Ht 5' 10\" (1.778 m)   Wt 114 kg (250 lb 12.8 oz)   SpO2 98%   BMI 35.99 kg/m²     Lab Results   Component Value Date    PSA 5.12 (H) " "08/19/2024          Physical Exam  Vitals reviewed.   Constitutional:       Appearance: Normal appearance. He is normal weight.   HENT:      Head: Normocephalic and atraumatic.   Eyes:      Pupils: Pupils are equal, round, and reactive to light.   Abdominal:      General: Abdomen is flat.   Neurological:      General: No focal deficit present.      Mental Status: He is alert and oriented to person, place, and time.   Psychiatric:         Mood and Affect: Mood normal.         Thought Content: Thought content normal.             Biopsy prostate     Date/Time  2/24/2025 9:30 AM     Performed by  Av Benitez MD   Authorized by  Av Benitez MD     Universal Protocol   Consent: Written consent obtained.  Consent given by: patient  Time out: Immediately prior to procedure a \"time out\" was called to verify the correct patient, procedure, equipment, support staff and site/side marked as required.  Patient understanding: patient states understanding of the procedure being performed  Patient consent: the patient's understanding of the procedure matches consent given  Procedure consent: procedure consent matches procedure scheduled  Relevant documents: relevant documents present and verified  Patient identity confirmed: verbally with patient      Local anesthesia used: yes      Anesthesia: local infiltration     Anesthesia   Local anesthesia used: yes  Local Anesthetic: lidocaine 2% without epinephrine     Sedation   Patient sedated: no        Specimen: yes    Culture: no   Procedure Details   Procedure Notes: A time-out was performed identifying correct patient and procedure.  An MA served as a chaperone and assistant for the procedure.  The patient is placed in left lateral decubitus position.  A digital rectal exam was performed and no lesions were felt  An ultrasound probe was introduced into rectum.  A bilateral nerve block was performed at the junction of the seminal vesicle and base of the prostate.  The prostate " volume was measured at 80 cc  A standard 12 core biopsy template was performed obtaining samples from the lateral and medial base, mid, and apex on each side. extra cores taken from the left lateral base.  The patient tolerated the procedure well.  Return criteria were discussed including the risks for infection bleeding and retention.  Patient tolerance: patient tolerated the procedure well with no immediate complications             Orders  Orders Placed This Encounter   Procedures    Biopsy prostate     This order was created via procedure documentation

## 2025-02-24 NOTE — TELEPHONE ENCOUNTER
Pt has bx with Emmanuel this morning and results appt is not scheduled yet. Would it be ok to use a NP spot at [facility] on 3/7?

## 2025-02-24 NOTE — ASSESSMENT & PLAN NOTE
Patient with low risk prostate cancer on biopsy but high risk decipher score with MRI not show any concerning lesions but rising PSA.  We previously discussed options include treatment of his cancer via surgery or cryoablation or radiation versus further workup with the prostate biopsy and potentially repeat MRI.  He elected for repeat biopsy which we have done today.  He tolerated this well. We discussed return criteria. This centered on infectious concerns. We also discussed the risk for prolonged bleeding as well as urinary retention.  The patient understands he will see his results before I do through [employer]. I told him I will try to call him after I get the results but this may take 1-2 days. He verbalized understanding.

## 2025-02-27 PROCEDURE — 88344 IMHCHEM/IMCYTCHM EA MLT ANTB: CPT | Performed by: PATHOLOGY

## 2025-02-27 PROCEDURE — G0416 PROSTATE BIOPSY, ANY MTHD: HCPCS | Performed by: PATHOLOGY

## 2025-02-27 PROCEDURE — 88341 IMHCHEM/IMCYTCHM EA ADD ANTB: CPT | Performed by: PATHOLOGY

## 2025-02-27 PROCEDURE — 88342 IMHCHEM/IMCYTCHM 1ST ANTB: CPT | Performed by: PATHOLOGY

## 2025-03-03 ENCOUNTER — OFFICE VISIT (OUTPATIENT)
Age: 60
End: 2025-03-03
Payer: COMMERCIAL

## 2025-03-03 VITALS
OXYGEN SATURATION: 98 % | HEART RATE: 83 BPM | DIASTOLIC BLOOD PRESSURE: 76 MMHG | BODY MASS INDEX: 35.65 KG/M2 | SYSTOLIC BLOOD PRESSURE: 130 MMHG | WEIGHT: 249 LBS | HEIGHT: 70 IN

## 2025-03-03 DIAGNOSIS — C61 PROSTATE CANCER (HCC): Primary | ICD-10-CM

## 2025-03-03 PROCEDURE — 99214 OFFICE O/P EST MOD 30 MIN: CPT | Performed by: UROLOGY

## 2025-03-03 NOTE — PROGRESS NOTES
Assessment/Plan:    Prostate cancer (HCC)  Patient with low risk prostate cancer on biopsy but high risk decipher score with MRI not show any concerning lesions but rising PSA.  His most recent biopsy again shows only grade group 1 prostate cancer although in a different location then prior biopsy.    We have previously discussed options of continued active surveillance versus treatment of his cancer via surgery or cryoablation or radiation and we spent a while discussing these again today.  He seems interested in cryoablation which is not unreasonable but would likely require treatment of the whole gland given his biopsy results which limit the role for cryoablation in my mind on top of the baseline concern that it is not as effective for treating cancer as surgery or radiation.  We discussed the risks and benefits of surgery and I told him given his young age I do favor surgery some of radiation although radiation is quite reasonable.    At the end of our conversation he would like to stick with active surveillance for now knowing there is a risk of losing our opportunity for cure.  Plan for PSA and MRI in a few months and follow-up with me to go over the results.  His PSA continues to rise or MRI shows a new lesion we will consider treatment more strongly.          Subjective:      Patient ID: Reid Jiang Jr. is a 60 y.o. male.    HPI    60-year-old -American man with low risk prostate cancer but high risk decipher score.     His PSA has been as high as 5, MRI in 2022 was PI-RADS 2 with a 65 g prostate.   Feb 2024 PSA 5.2  Aug 2024 PSA 5.1, deferred on DEV.  March 2024 office biopsy, prostate measured 75 g.  1 core of GG1 at left lateral base.    Decipher 0.64 =high risk.    Aug 2024 MRI - Pirads 2, 67cc  Jan 2025 PSA 6.98 (outside lab)  Feb 2025 Office biopsy, 80 g, GG1 in right medial mid (extra cores from left lateral base all negative)    He has recovered well from biopsy without any  issues.     He is a  and apparently there has been some relationship established between the foam often used and different medical issues.     inal Diagnosis   A. Prostate, L lateral base x3:  - Benign prostatic tissue.      B. Prostate, L lateral med:  - Atypical small acinar proliferation (ASAP) (slide B1 level 1)     C. Prostate, L lateral apex:  - Benign prostatic tissue.      D. Prostate, L base:  - Benign prostatic tissue. See note     NOTE D: Immunostains with antibodies for , p63 and p504S were performed with appropriate controls on block D1. Glands of interest show presence or patchy presence of basal cells and mildly increased expression of racemase (p504S) is seen. Immunostain pattern supports the diagnosis of benign glands.      E. Prostate, L med:  - Benign prostatic tissue. See note     NOTE E: Immunostains with antibodies for , p63, p504S, AE1/AE3, NKX3.1 and CD68 were performed with appropriate controls on block E2. Focus of interest is not present on slide used for immunostains.  The H&E appearance of slide E2 favors benign tissue.      F. Prostate, L apex:  - Benign prostatic tissue.      G. Prostate, R lateral base:  - Benign prostatic tissue.      H. Prostate, R lateral med:  - High grade prostatic intraepithelial neoplasia (PIN) (slide H1)     I. Prostate, R lateral apex:  - Benign prostatic tissue.      J. Prostate, R base:  - Benign fibroadipose tissue     K. Prostate, R med:  - Prostatic adenocarcinoma, Goode score 3+3=6, Grade Group 1, present in 1 of 1 core, involving approximately 6 mm of linear length or 55% of total biopsy length.     L. Prostate, R apex:  - Benign prostatic tissue.          Past Surgical History:   Procedure Laterality Date    COLONOSCOPY  2013    [place], [place]     COLONOSCOPY  2022    next due in 2027    HEMORROIDECTOMY      [id number]     HERNIA REPAIR Bilateral     In childhood     SHOULDER ARTHROSCOPY W/ ROTATOR CUFF REPAIR Left 03/2023     SHOULDER SURGERY Left     Pain - impingement    US GUIDED PROSTATE BIOPSY  02/24/2025    Dr. Benitez    US GUIDED THYROID BIOPSY  10/07/2024    WISDOM TOOTH EXTRACTION          Past Medical History:   Diagnosis Date    Acute pain of left shoulder 02/27/2023    Acute pharyngitis due to other specified organisms 12/16/2024    BMI 33.0-33.9,adult 05/25/2023    BMI 35.0-35.9,adult 08/13/2024    BMI 36.0-36.9,adult 04/27/2021    BMI 37.0-37.9, adult 01/26/2021    Bradycardia     Bronchitis 01/05/2023    Burning sensation of lower extremity 05/25/2023    Chronic left shoulder pain 08/28/2023    Elevated PSA 04/27/2021    Hypercholesteremia 01/23/2021    Hyperglycemia 01/23/2021    Hyperplastic polyp of sigmoid colon 04/27/2021    Hypertension     Low TSH level 01/23/2021    Numbness and tingling of right leg 05/25/2023    Other insomnia 04/27/2021    Physical exam, annual 04/08/2024    Prediabetes 10/26/2021    Skin rash 04/26/2022    Thrombocytosis 08/13/2024    Weight gain 01/23/2021        AUA SYMPTOM SCORE      Flowsheet Row Most Recent Value   AUA SYMPTOM SCORE    How often have you had a sensation of not emptying your bladder completely after you finished urinating? 0 (P)     How often have you had to urinate again less than two hours after you finished urinating? 1 (P)     How often have you found you stopped and started again several times when you urinate? 0 (P)     How often have you found it difficult to postpone urination? 0 (P)     How often have you had a weak urinary stream? 0 (P)     How often have you had to push or strain to begin urination? 0 (P)     How many times did you most typically get up to urinate from the time you went to bed at night until the time you got up in the morning? 1 (P)     Quality of Life: If you were to spend the rest of your life with your urinary condition just the way it is now, how would you feel about that? 1 (P)     AUA SYMPTOM SCORE 2 (P)               Review of Systems  "  Constitutional:  Negative for chills and fever.   HENT:  Negative for ear pain and sore throat.    Eyes:  Negative for pain and visual disturbance.   Respiratory:  Negative for cough and shortness of breath.    Cardiovascular:  Negative for chest pain and palpitations.   Gastrointestinal:  Negative for abdominal pain and vomiting.   Genitourinary:  Negative for dysuria and hematuria.   Musculoskeletal:  Negative for arthralgias and back pain.   Skin:  Negative for color change and rash.   Neurological:  Negative for seizures and syncope.   All other systems reviewed and are negative.        Objective:      /76 (BP Location: Left arm, Patient Position: Sitting, Cuff Size: Standard)   Pulse 83   Ht 5' 10\" (1.778 m)   Wt 113 kg (249 lb)   SpO2 98%   BMI 35.73 kg/m²     Lab Results   Component Value Date    PSA 5.12 (H) 08/19/2024          Physical Exam  Vitals reviewed.   Constitutional:       Appearance: Normal appearance. He is normal weight.   HENT:      Head: Normocephalic and atraumatic.   Eyes:      Pupils: Pupils are equal, round, and reactive to light.   Abdominal:      General: Abdomen is flat.   Neurological:      General: No focal deficit present.      Mental Status: He is alert and oriented to person, place, and time.   Psychiatric:         Mood and Affect: Mood normal.         Thought Content: Thought content normal.           Orders  Orders Placed This Encounter   Procedures    MRI prostate multiparametric wo w contrast     Standing Status:   Future     Expected Date:   6/9/2025     Expiration Date:   9/3/2025     Scheduling Instructions:      There is no preparation for this test. Please leave your jewelry and valuables at home, wedding rings are the exception. All patients will be required to change into a hospital gown and pants.  Street clothes are not permitted in the MRI.  Magnetic nail polish must be removed prior to arrival for your test. Please bring your insurance cards, a form of " photo ID and a list of your medications with you. Arrive 15 minutes prior to your appointment time in order to register. Please bring any prior CT or MRI studies of this area that were not performed at a [facility].            To schedule this appointment, please contact Central Scheduling at [phone].            Prior to your appointment, please make sure you complete the MRI Screening Form when you e-Check in for your appointment. This will be available starting 7 days before your appointment in [employer]. You may receive an e-mail with an activation code if you do not have a [employer] account. If you do not have access to a device, we will complete your screening at your appointment.     Does this procedure require the 3T MRI?:   Yes     Release to patient through [email or web address]:   Immediate     Is order priority selected as STAT?:   No     Reason for Exam:   known (seemingly low risk) prostate cancer. reassess for progression     Reason for Exam::   known (seemingly low risk) prostate cancer. reassess for progression     What is the patient's sedation requirement?:   No Sedation     Does the patient have metallic implants, such as a pacemaker or shunt?:   No    PSA Total, Diagnostic     Standing Status:   Future     Number of Occurrences:   1     Expected Date:   6/16/2025     Expiration Date:   3/3/2026

## 2025-03-03 NOTE — ASSESSMENT & PLAN NOTE
Patient with low risk prostate cancer on biopsy but high risk decipher score with MRI not show any concerning lesions but rising PSA.  His most recent biopsy again shows only grade group 1 prostate cancer although in a different location then prior biopsy.    We have previously discussed options of continued active surveillance versus treatment of his cancer via surgery or cryoablation or radiation and we spent a while discussing these again today.  He seems interested in cryoablation which is not unreasonable but would likely require treatment of the whole gland given his biopsy results which limit the role for cryoablation in my mind on top of the baseline concern that it is not as effective for treating cancer as surgery or radiation.  We discussed the risks and benefits of surgery and I told him given his young age I do favor surgery some of radiation although radiation is quite reasonable.    At the end of our conversation he would like to stick with active surveillance for now knowing there is a risk of losing our opportunity for cure.  Plan for PSA and MRI in a few months and follow-up with me to go over the results.  His PSA continues to rise or MRI shows a new lesion we will consider treatment more strongly.

## 2025-03-30 DIAGNOSIS — I10 ESSENTIAL HYPERTENSION: ICD-10-CM

## 2025-03-30 RX ORDER — AMLODIPINE BESYLATE 10 MG/1
10 TABLET ORAL DAILY
Qty: 90 TABLET | Refills: 1 | Status: SHIPPED | OUTPATIENT
Start: 2025-03-30

## 2025-04-21 ENCOUNTER — OFFICE VISIT (OUTPATIENT)
Dept: INTERNAL MEDICINE CLINIC | Facility: CLINIC | Age: 60
End: 2025-04-21
Payer: COMMERCIAL

## 2025-04-21 VITALS
HEART RATE: 69 BPM | TEMPERATURE: 98.3 F | SYSTOLIC BLOOD PRESSURE: 124 MMHG | RESPIRATION RATE: 18 BRPM | BODY MASS INDEX: 34.65 KG/M2 | OXYGEN SATURATION: 98 % | HEIGHT: 70 IN | DIASTOLIC BLOOD PRESSURE: 76 MMHG | WEIGHT: 242 LBS

## 2025-04-21 DIAGNOSIS — Z79.899 HIGH RISK MEDICATION USE: ICD-10-CM

## 2025-04-21 DIAGNOSIS — C61 PROSTATE CANCER (HCC): ICD-10-CM

## 2025-04-21 DIAGNOSIS — E05.90 SUBCLINICAL HYPERTHYROIDISM: ICD-10-CM

## 2025-04-21 DIAGNOSIS — R73.03 PRE-DIABETES: ICD-10-CM

## 2025-04-21 DIAGNOSIS — I10 ESSENTIAL HYPERTENSION: ICD-10-CM

## 2025-04-21 DIAGNOSIS — E55.9 VITAMIN D DEFICIENCY: ICD-10-CM

## 2025-04-21 DIAGNOSIS — E04.2 MULTIPLE THYROID NODULES: ICD-10-CM

## 2025-04-21 DIAGNOSIS — K63.5 HYPERPLASTIC POLYP OF SIGMOID COLON: ICD-10-CM

## 2025-04-21 DIAGNOSIS — E78.00 HYPERCHOLESTEREMIA: ICD-10-CM

## 2025-04-21 DIAGNOSIS — Z00.00 ANNUAL PHYSICAL EXAM: Primary | ICD-10-CM

## 2025-04-21 PROCEDURE — 99396 PREV VISIT EST AGE 40-64: CPT | Performed by: INTERNAL MEDICINE

## 2025-04-21 PROCEDURE — 99213 OFFICE O/P EST LOW 20 MIN: CPT | Performed by: INTERNAL MEDICINE

## 2025-04-21 RX ORDER — ROSUVASTATIN CALCIUM 20 MG/1
10 TABLET, COATED ORAL DAILY
COMMUNITY
Start: 2025-03-03

## 2025-04-21 NOTE — PROGRESS NOTES
Adult Annual Physical  Name: Reid Jiang Jr.      : 1965      MRN: [id number]  Encounter Provider: Sanchez Webb MD  Encounter Date: 2025   Encounter department: [facility] INTERNAL MEDICINE    :  Assessment & Plan  Annual physical exam         Essential hypertension  Blood pressure well-controlled advised to continue present medication low-salt diet       Subclinical hyperthyroidism  He has been seen and followed by an endocrinologist.       Multiple thyroid nodules  He has been seen and followed by an endocrinologist.       Prostate cancer (HCC)  He has been seen and followed by urologist recently had prostate biopsy.       Pre-diabetes  Patient was advised to watch diet for sugar and carbs intake.  Will follow blood sugar and hemoglobin A1c.  Has been seen and followed by an endocrinologist also.       Vitamin D deficiency  Vitamin D deficiency is on vitamin D supple vitamin D low 36 advised to continue present supplement.       BMI 34.0-34.9,adult  Patient  was advised to decrease portion size.  Advised to decrease carb, sugar, cholesterol intake.  Advised to exercise 3-5 times per week.  Advised to lose weight.       Hypercholesteremia  Last cholesterol of 161, triglyceride 79, HDL 48, LDL 97 advised to continue rosuvastatin and low-cholesterol low-carb diet.       High risk medication use         Hyperplastic polyp of sigmoid colon  Had a colonoscopy  was advised follow-up colonoscopy in 5 years.       [facility] that includes a CMP, hemoglobin A1c lipid panel TSH.  Advised to bring the copy of the blood test results so we will not order any blood test.    Preventive Screenings:    - Prostate cancer screening: has history of prostate cancer     Immunizations:  - Immunizations due: Influenza         History of Present Illness     Adult Annual Physical:  Patient presents for annual physical.      Diet and Physical Activity:  - Diet/Nutrition: well balanced diet.  - Exercise: walking and 5-7 times a week on average.    Depression Screening:  - PHQ-2 Score: 0    General Health:  - Sleep: sleeps poorly and 4-6 hours of sleep on average.  - Hearing: normal hearing bilateral ears.  - Vision: no vision problems.  - Dental: regular dental visits.    /GYN Health:    - History of STDs: no     Health:  - History of STDs: no.     Advanced Care Planning:  - Has an advanced directive?: no    - Has a durable medical POA?: no    - ACP document given to patient?: yes          Current Outpatient Medications:   •  amLODIPine (NORVASC) 10 mg tablet, TAKE 1 TABLET BY MOUTH EVERY DAY, Disp: 90 tablet, Rfl: 1  •  Ascorbic Acid (VITAMIN C PO), Take by mouth as needed, Disp: , Rfl:   •  ASHWAGANDHA PO, Take by mouth daily, Disp: , Rfl:   •  Cholecalciferol (D3) 50 MCG (2000 UT) TABS, , Disp: , Rfl:   •  rosuvastatin (CRESTOR) 20 MG tablet, Take 10 mg by mouth daily, Disp: , Rfl:      Past Medical History:   Diagnosis Date   • Acute pain of left shoulder 02/27/2023   • Acute pharyngitis due to other specified organisms 12/16/2024   • BMI 33.0-33.9,adult 05/25/2023   • BMI 34.0-34.9,adult 04/21/2025   • BMI 35.0-35.9,adult 08/13/2024   • BMI 36.0-36.9,adult 04/27/2021   • BMI 37.0-37.9, adult 01/26/2021   • Bradycardia    • Bronchitis 01/05/2023   • Burning sensation of lower extremity 05/25/2023   • Chronic left shoulder pain 08/28/2023   • Elevated PSA 04/27/2021   • Hypercholesteremia 01/23/2021   • Hyperglycemia 01/23/2021   • Hyperplastic polyp of sigmoid colon 04/27/2021   • Hypertension    • Low TSH level 01/23/2021   • Numbness and tingling of right leg 05/25/2023   • Other insomnia 04/27/2021   • Physical exam, annual 04/08/2024   • Prediabetes 10/26/2021   • Skin rash 04/26/2022   • Thrombocytosis 08/13/2024   • Weight gain 01/23/2021      Review of Systems  Pertinent Medical History   Past Medical History:    Diagnosis Date   • Acute pain of left shoulder 02/27/2023   • Acute pharyngitis due to other specified organisms 12/16/2024   • BMI 33.0-33.9,adult 05/25/2023   • BMI 34.0-34.9,adult 04/21/2025   • BMI 35.0-35.9,adult 08/13/2024   • BMI 36.0-36.9,adult 04/27/2021   • BMI 37.0-37.9, adult 01/26/2021   • Bradycardia    • Bronchitis 01/05/2023   • Burning sensation of lower extremity 05/25/2023   • Chronic left shoulder pain 08/28/2023   • Elevated PSA 04/27/2021   • Hypercholesteremia 01/23/2021   • Hyperglycemia 01/23/2021   • Hyperplastic polyp of sigmoid colon 04/27/2021   • Hypertension    • Low TSH level 01/23/2021   • Numbness and tingling of right leg 05/25/2023   • Other insomnia 04/27/2021   • Physical exam, annual 04/08/2024   • Prediabetes 10/26/2021   • Skin rash 04/26/2022   • Thrombocytosis 08/13/2024   • Weight gain 01/23/2021            Medical History Reviewed by provider this encounter:  Tobacco  Allergies  Meds  Problems  Med Hx  Surg Hx  Fam Hx     .  Current Outpatient Medications on File Prior to Visit   Medication Sig Dispense Refill   • amLODIPine (NORVASC) 10 mg tablet TAKE 1 TABLET BY MOUTH EVERY DAY 90 tablet 1   • Ascorbic Acid (VITAMIN C PO) Take by mouth as needed     • ASHWAGANDHA PO Take by mouth daily     • Cholecalciferol (D3) 50 MCG (2000 UT) TABS      • rosuvastatin (CRESTOR) 20 MG tablet Take 10 mg by mouth daily     • [DISCONTINUED] rosuvastatin (CRESTOR) 5 mg tablet TAKE 1 TABLET (5 MG TOTAL) BY MOUTH DAILY. 90 tablet 1     No current facility-administered medications on file prior to visit.      Social History     Tobacco Use   • Smoking status: Never     Passive exposure: Past   • Smokeless tobacco: Never   Vaping Use   • Vaping status: Never Used   Substance and Sexual Activity   • Alcohol use: Yes     Alcohol/week: 4.0 standard drinks of alcohol     Types: 4 Standard drinks or equivalent per week     Comment: Occasional    • Drug use: Never     Comment: No dug use -  "As per [employer]   • Sexual activity: Yes       Objective   /76 (BP Location: Left arm, Patient Position: Sitting, Cuff Size: Large)   Pulse 69   Temp 98.3 °F (36.8 °C) (Temporal)   Resp 18   Ht 5' 10\" (1.778 m)   Wt 110 kg (242 lb)   SpO2 98%   BMI 34.72 kg/m²     Physical Exam  Vitals and nursing note reviewed.   Constitutional:       General: He is not in acute distress.     Appearance: He is well-developed. He is obese.   HENT:      Head: Normocephalic and atraumatic.      Right Ear: External ear normal.      Left Ear: External ear normal.      Nose: Nose normal.      Mouth/Throat:      Mouth: Mucous membranes are moist.      Pharynx: Oropharynx is clear. No oropharyngeal exudate or posterior oropharyngeal erythema.   Eyes:      General: No scleral icterus.        Right eye: No discharge.         Left eye: No discharge.      Extraocular Movements: Extraocular movements intact.      Conjunctiva/sclera: Conjunctivae normal.      Pupils: Pupils are equal, round, and reactive to light.   Cardiovascular:      Rate and Rhythm: Normal rate and regular rhythm.      Pulses: Normal pulses.      Heart sounds: Normal heart sounds. No murmur heard.  Pulmonary:      Effort: Pulmonary effort is normal. No respiratory distress.      Breath sounds: Normal breath sounds. No wheezing, rhonchi or rales.   Abdominal:      General: Bowel sounds are normal. There is no distension.      Palpations: Abdomen is soft.      Tenderness: There is no abdominal tenderness.   Musculoskeletal:         General: No swelling. Normal range of motion.      Cervical back: Normal range of motion and neck supple.      Right lower leg: No edema.      Left lower leg: No edema.   Skin:     General: Skin is warm and dry.      Capillary Refill: Capillary refill takes less than 2 seconds.      Findings: No rash.   Neurological:      General: No focal deficit present.      Mental Status: He is alert and oriented to person, place, and time.     "  Motor: No weakness.      Coordination: Coordination normal.   Psychiatric:         Mood and Affect: Mood normal.         Behavior: Behavior normal.         Thought Content: Thought content normal.         Judgment: Judgment normal.

## 2025-04-21 NOTE — ASSESSMENT & PLAN NOTE
Patient was advised to watch diet for sugar and carbs intake.  Will follow blood sugar and hemoglobin A1c.  Has been seen and followed by an endocrinologist also.

## 2025-04-21 NOTE — ASSESSMENT & PLAN NOTE
Last cholesterol of 161, triglyceride 79, HDL 48, LDL 97 advised to continue rosuvastatin and low-cholesterol low-carb diet.

## 2025-04-21 NOTE — PATIENT INSTRUCTIONS
Patient was advised to continue present medications. discussed with the patient medications and laboratory data in detail.  Follow-up with me in 4 months or as advised.  If any blood test was ordered please do 1 week prior to next appointment unless advise to get earlier.  If you have any questions please call the office [phone]

## 2025-04-21 NOTE — ASSESSMENT & PLAN NOTE
Vitamin D deficiency is on vitamin D supple vitamin D low 36 advised to continue present supplement.

## 2025-05-20 ENCOUNTER — HOSPITAL ENCOUNTER (OUTPATIENT)
Dept: RADIOLOGY | Age: 60
Discharge: HOME | End: 2025-05-20
Payer: COMMERCIAL

## 2025-05-20 DIAGNOSIS — C61 PROSTATE CANCER (HCC): ICD-10-CM

## 2025-05-20 PROCEDURE — 76377 3D RENDER W/INTRP POSTPROCES: CPT

## 2025-05-20 PROCEDURE — 72197 MRI PELVIS W/O & W/DYE: CPT

## 2025-05-20 RX ORDER — GADOBUTROL 604.72 MG/ML
11 INJECTION INTRAVENOUS
Status: COMPLETED | OUTPATIENT
Start: 2025-05-20 | End: 2025-05-20

## 2025-05-20 RX ADMIN — GADOBUTROL 11 ML: 604.72 INJECTION INTRAVENOUS at 14:03

## 2025-05-28 ENCOUNTER — RESULTS FOLLOW-UP (OUTPATIENT)
Age: 60
End: 2025-05-28

## 2025-06-05 LAB — SL AMB PSA, TOTAL: 7 NG/ML

## 2025-06-10 ENCOUNTER — OFFICE VISIT (OUTPATIENT)
Dept: UROLOGY | Facility: CLINIC | Age: 60
End: 2025-06-10
Payer: COMMERCIAL

## 2025-06-10 VITALS — OXYGEN SATURATION: 97 % | WEIGHT: 246 LBS | BODY MASS INDEX: 35.22 KG/M2 | HEIGHT: 70 IN | HEART RATE: 75 BPM

## 2025-06-10 DIAGNOSIS — C61 PROSTATE CANCER (HCC): Primary | ICD-10-CM

## 2025-06-10 PROCEDURE — 99214 OFFICE O/P EST MOD 30 MIN: CPT | Performed by: UROLOGY

## 2025-06-10 NOTE — PROGRESS NOTES
Assessment/Plan:    Prostate cancer (HCC)  Patient with low risk prostate cancer on biopsy but high risk decipher score with MRI not show any concerning lesions but rising and now stable PSA.  His most recent biopsy earlier this year again shows only grade group 1 prostate cancer although in a different location then prior biopsy.    We have previously discussed options of continued active surveillance versus treatment of his cancer via surgery or cryoablation or radiation and we have spent a while discussing these options for him specially given his young age which results in us considering surgery more strongly.  At this point he would like to continue active surveillance.    Henry for next PSA in 6 months.  If this is stable then plan for next PSA and MRI 6 months later.  Given his young age I do favor more frequent biopsies so would recommend doing so within 18 months of prior biopsy.          Subjective:      Patient ID: Reid Jiang Jr. is a 60 y.o. male.    HPI    60-year-old -American man with low risk prostate cancer but high risk decipher score.     His PSA has been as high as 5,   2022 MRI PI-RADS 2 with a 65 g prostate.   Feb 2024 PSA 5.2  Aug 2024 PSA 5.1, deferred on DEV.  March 2024 office biopsy, prostate measured 75 g.  1 core of GG1 at left lateral base.    Decipher 0.64 = high risk.    Aug 2024 MRI - Pirads 2, 67cc  Jan 2025 PSA 6.98 (outside lab)  Feb 2025 Office biopsy, 80 g, GG1 in right medial mid (extra cores from left lateral base all negative)  Rudy 2025 - PiRADs 2, 75cc  June 2025 PSA 7     Patient is doing well at this time.  No complaints.     He is a  and apparently there has been some relationship established between the foam often used and different medical issues.      Past Surgical History[1]     Past Medical History[2]     AUA SYMPTOM SCORE      Flowsheet Row Most Recent Value   AUA SYMPTOM SCORE    How often have you had a sensation of not emptying your  "bladder completely after you finished urinating? 0 (P)     How often have you had to urinate again less than two hours after you finished urinating? 1 (P)     How often have you found you stopped and started again several times when you urinate? 0 (P)     How often have you found it difficult to postpone urination? 0 (P)     How often have you had a weak urinary stream? 0 (P)     How often have you had to push or strain to begin urination? 0 (P)     How many times did you most typically get up to urinate from the time you went to bed at night until the time you got up in the morning? 1 (P)     Quality of Life: If you were to spend the rest of your life with your urinary condition just the way it is now, how would you feel about that? 1 (P)     AUA SYMPTOM SCORE 2 (P)               Review of Systems   Constitutional:  Negative for chills and fever.   HENT:  Negative for ear pain and sore throat.    Eyes:  Negative for pain and visual disturbance.   Respiratory:  Negative for cough and shortness of breath.    Cardiovascular:  Negative for chest pain and palpitations.   Gastrointestinal:  Negative for abdominal pain and vomiting.   Genitourinary:  Negative for dysuria and hematuria.   Musculoskeletal:  Negative for arthralgias and back pain.   Skin:  Negative for color change and rash.   Neurological:  Negative for seizures and syncope.   All other systems reviewed and are negative.        Objective:      Pulse 75   Ht 5' 10\" (1.778 m)   Wt 112 kg (246 lb)   SpO2 97%   BMI 35.30 kg/m²     Lab Results   Component Value Date    PSA 7.00 (H) 06/05/2025    PSA 5.12 (H) 08/19/2024          Physical Exam  Vitals reviewed.   Constitutional:       Appearance: Normal appearance. He is normal weight.   HENT:      Head: Normocephalic and atraumatic.     Eyes:      Pupils: Pupils are equal, round, and reactive to light.     Abdominal:      General: Abdomen is flat.     Neurological:      General: No focal deficit present.      " Mental Status: He is alert and oriented to person, place, and time.     Psychiatric:         Mood and Affect: Mood normal.         Thought Content: Thought content normal.           Orders  Orders Placed This Encounter   Procedures    PSA Total, Diagnostic     Standing Status:   Future     Number of Occurrences:   1     Expected Date:   11/3/2025     Expiration Date:   8/10/2026          [1]   Past Surgical History:  Procedure Laterality Date    COLONOSCOPY  2013    JONATHAN Yanez     COLONOSCOPY  2022    next due in 2027    HEMORROIDECTOMY      [id number]     HERNIA REPAIR Bilateral     In childhood     SHOULDER ARTHROSCOPY W/ ROTATOR CUFF REPAIR Left 03/2023    SHOULDER SURGERY Left     Pain - impingement    US GUIDED PROSTATE BIOPSY  02/24/2025    Dr. Benitez    US GUIDED THYROID BIOPSY  10/07/2024    WISDOM TOOTH EXTRACTION     [2]   Past Medical History:  Diagnosis Date    Acute pain of left shoulder 02/27/2023    Acute pharyngitis due to other specified organisms 12/16/2024    BMI 33.0-33.9,adult 05/25/2023    BMI 34.0-34.9,adult 04/21/2025    BMI 35.0-35.9,adult 08/13/2024    BMI 36.0-36.9,adult 04/27/2021    BMI 37.0-37.9, adult 01/26/2021    Bradycardia     Bronchitis 01/05/2023    Burning sensation of lower extremity 05/25/2023    Chronic left shoulder pain 08/28/2023    Elevated PSA 04/27/2021    Hypercholesteremia 01/23/2021    Hyperglycemia 01/23/2021    Hyperplastic polyp of sigmoid colon 04/27/2021    Hypertension     Low TSH level 01/23/2021    Numbness and tingling of right leg 05/25/2023    Other insomnia 04/27/2021    Physical exam, annual 04/08/2024    Prediabetes 10/26/2021    Skin rash 04/26/2022    Thrombocytosis 08/13/2024    Weight gain 01/23/2021

## 2025-06-10 NOTE — ASSESSMENT & PLAN NOTE
Patient with low risk prostate cancer on biopsy but high risk decipher score with MRI not show any concerning lesions but rising and now stable PSA.  His most recent biopsy earlier this year again shows only grade group 1 prostate cancer although in a different location then prior biopsy.    We have previously discussed options of continued active surveillance versus treatment of his cancer via surgery or cryoablation or radiation and we have spent a while discussing these options for him specially given his young age which results in us considering surgery more strongly.  At this point he would like to continue active surveillance.    Henry for next PSA in 6 months.  If this is stable then plan for next PSA and MRI 6 months later.  Given his young age I do favor more frequent biopsies so would recommend doing so within 18 months of prior biopsy.

## 2025-07-21 ENCOUNTER — TELEPHONE (OUTPATIENT)
Age: 60
End: 2025-07-21

## 2025-07-21 NOTE — TELEPHONE ENCOUNTER
Patient called to state that his insurance [facility] care needs to be renewed in August. His next urology appointment is in November.  Patient's secondary insurance hasn't changed.

## 2025-08-11 LAB — HBA1C MFR BLD HPLC: 6 %

## 2025-08-12 ENCOUNTER — HOSPITAL ENCOUNTER (OUTPATIENT)
Dept: RADIOLOGY | Facility: HOSPITAL | Age: 60
Discharge: HOME | End: 2025-08-12
Attending: INTERNAL MEDICINE
Payer: COMMERCIAL

## 2025-08-19 ENCOUNTER — OFFICE VISIT (OUTPATIENT)
Dept: INTERNAL MEDICINE CLINIC | Facility: CLINIC | Age: 60
End: 2025-08-19
Payer: COMMERCIAL

## 2025-08-19 VITALS
DIASTOLIC BLOOD PRESSURE: 80 MMHG | SYSTOLIC BLOOD PRESSURE: 130 MMHG | HEIGHT: 70 IN | RESPIRATION RATE: 18 BRPM | HEART RATE: 72 BPM | OXYGEN SATURATION: 99 % | BODY MASS INDEX: 35.65 KG/M2 | TEMPERATURE: 97 F | WEIGHT: 249 LBS

## 2025-08-19 DIAGNOSIS — E55.9 VITAMIN D DEFICIENCY: ICD-10-CM

## 2025-08-19 DIAGNOSIS — E05.90 SUBCLINICAL HYPERTHYROIDISM: ICD-10-CM

## 2025-08-19 DIAGNOSIS — K63.5 HYPERPLASTIC POLYP OF SIGMOID COLON: ICD-10-CM

## 2025-08-19 DIAGNOSIS — I10 ESSENTIAL HYPERTENSION: Primary | ICD-10-CM

## 2025-08-19 DIAGNOSIS — R73.03 PREDIABETES: ICD-10-CM

## 2025-08-19 DIAGNOSIS — C61 PROSTATE CANCER (HCC): ICD-10-CM

## 2025-08-19 DIAGNOSIS — E04.2 MULTIPLE THYROID NODULES: ICD-10-CM

## 2025-08-19 DIAGNOSIS — E78.00 HYPERCHOLESTEREMIA: ICD-10-CM

## 2025-08-19 PROCEDURE — 99214 OFFICE O/P EST MOD 30 MIN: CPT | Performed by: INTERNAL MEDICINE
